# Patient Record
Sex: FEMALE | Race: WHITE | NOT HISPANIC OR LATINO | Employment: OTHER | URBAN - METROPOLITAN AREA
[De-identification: names, ages, dates, MRNs, and addresses within clinical notes are randomized per-mention and may not be internally consistent; named-entity substitution may affect disease eponyms.]

---

## 2017-03-29 ENCOUNTER — HOSPITAL ENCOUNTER (OUTPATIENT)
Facility: CLINIC | Age: 67
Setting detail: OBSERVATION
Discharge: HOME | End: 2017-03-30
Attending: FAMILY MEDICINE | Admitting: FAMILY MEDICINE
Payer: COMMERCIAL

## 2017-03-29 ENCOUNTER — APPOINTMENT (OUTPATIENT)
Dept: MRI IMAGING | Facility: CLINIC | Age: 67
End: 2017-03-29
Attending: FAMILY MEDICINE
Payer: COMMERCIAL

## 2017-03-29 ENCOUNTER — APPOINTMENT (OUTPATIENT)
Dept: CT IMAGING | Facility: CLINIC | Age: 67
End: 2017-03-29
Attending: FAMILY MEDICINE
Payer: COMMERCIAL

## 2017-03-29 DIAGNOSIS — G45.9 TRANSIENT CEREBRAL ISCHEMIA, UNSPECIFIED TYPE: ICD-10-CM

## 2017-03-29 LAB
ANION GAP SERPL CALCULATED.3IONS-SCNC: 8 MMOL/L (ref 3–14)
APTT PPP: 30 SEC (ref 22–37)
BASOPHILS # BLD AUTO: 0.1 10E9/L (ref 0–0.2)
BASOPHILS NFR BLD AUTO: 1.9 %
BUN SERPL-MCNC: 9 MG/DL (ref 7–30)
CALCIUM SERPL-MCNC: 8.5 MG/DL (ref 8.5–10.1)
CHLORIDE SERPL-SCNC: 106 MMOL/L (ref 94–109)
CO2 SERPL-SCNC: 29 MMOL/L (ref 20–32)
CREAT SERPL-MCNC: 0.84 MG/DL (ref 0.52–1.04)
DIFFERENTIAL METHOD BLD: NORMAL
EOSINOPHIL # BLD AUTO: 0.4 10E9/L (ref 0–0.7)
EOSINOPHIL NFR BLD AUTO: 5.8 %
ERYTHROCYTE [DISTWIDTH] IN BLOOD BY AUTOMATED COUNT: 13 % (ref 10–15)
GFR SERPL CREATININE-BSD FRML MDRD: 68 ML/MIN/1.7M2
GLUCOSE BLDC GLUCOMTR-MCNC: 102 MG/DL (ref 70–99)
GLUCOSE SERPL-MCNC: 97 MG/DL (ref 70–99)
HCT VFR BLD AUTO: 41.6 % (ref 35–47)
HGB BLD-MCNC: 14 G/DL (ref 11.7–15.7)
IMM GRANULOCYTES # BLD: 0 10E9/L (ref 0–0.4)
IMM GRANULOCYTES NFR BLD: 0.3 %
INR PPP: 1 (ref 0.86–1.14)
LYMPHOCYTES # BLD AUTO: 1.7 10E9/L (ref 0.8–5.3)
LYMPHOCYTES NFR BLD AUTO: 26.1 %
MCH RBC QN AUTO: 29.5 PG (ref 26.5–33)
MCHC RBC AUTO-ENTMCNC: 33.7 G/DL (ref 31.5–36.5)
MCV RBC AUTO: 88 FL (ref 78–100)
MONOCYTES # BLD AUTO: 0.5 10E9/L (ref 0–1.3)
MONOCYTES NFR BLD AUTO: 7.9 %
NEUTROPHILS # BLD AUTO: 3.7 10E9/L (ref 1.6–8.3)
NEUTROPHILS NFR BLD AUTO: 58 %
PLATELET # BLD AUTO: 227 10E9/L (ref 150–450)
POTASSIUM SERPL-SCNC: 3.6 MMOL/L (ref 3.4–5.3)
RBC # BLD AUTO: 4.75 10E12/L (ref 3.8–5.2)
SODIUM SERPL-SCNC: 143 MMOL/L (ref 133–144)
TROPONIN I SERPL-MCNC: NORMAL UG/L (ref 0–0.04)
WBC # BLD AUTO: 6.3 10E9/L (ref 4–11)

## 2017-03-29 PROCEDURE — 85730 THROMBOPLASTIN TIME PARTIAL: CPT | Performed by: FAMILY MEDICINE

## 2017-03-29 PROCEDURE — 70450 CT HEAD/BRAIN W/O DYE: CPT | Mod: XS

## 2017-03-29 PROCEDURE — 25000132 ZZH RX MED GY IP 250 OP 250 PS 637: Performed by: FAMILY MEDICINE

## 2017-03-29 PROCEDURE — A9585 GADOBUTROL INJECTION: HCPCS | Performed by: FAMILY MEDICINE

## 2017-03-29 PROCEDURE — 85610 PROTHROMBIN TIME: CPT | Performed by: FAMILY MEDICINE

## 2017-03-29 PROCEDURE — 70498 CT ANGIOGRAPHY NECK: CPT

## 2017-03-29 PROCEDURE — 25000125 ZZHC RX 250: Performed by: RADIOLOGY

## 2017-03-29 PROCEDURE — 99285 EMERGENCY DEPT VISIT HI MDM: CPT | Performed by: FAMILY MEDICINE

## 2017-03-29 PROCEDURE — 85025 COMPLETE CBC W/AUTO DIFF WBC: CPT | Performed by: FAMILY MEDICINE

## 2017-03-29 PROCEDURE — 25500064 ZZH RX 255 OP 636: Performed by: RADIOLOGY

## 2017-03-29 PROCEDURE — 96360 HYDRATION IV INFUSION INIT: CPT

## 2017-03-29 PROCEDURE — 96361 HYDRATE IV INFUSION ADD-ON: CPT

## 2017-03-29 PROCEDURE — 84484 ASSAY OF TROPONIN QUANT: CPT | Performed by: FAMILY MEDICINE

## 2017-03-29 PROCEDURE — 25000128 H RX IP 250 OP 636: Performed by: FAMILY MEDICINE

## 2017-03-29 PROCEDURE — 99285 EMERGENCY DEPT VISIT HI MDM: CPT | Mod: 25

## 2017-03-29 PROCEDURE — 00000146 ZZHCL STATISTIC GLUCOSE BY METER IP

## 2017-03-29 PROCEDURE — 99220 ZZC INITIAL OBSERVATION CARE,LEVL III: CPT | Performed by: FAMILY MEDICINE

## 2017-03-29 PROCEDURE — G0378 HOSPITAL OBSERVATION PER HR: HCPCS

## 2017-03-29 PROCEDURE — 80048 BASIC METABOLIC PNL TOTAL CA: CPT | Performed by: FAMILY MEDICINE

## 2017-03-29 PROCEDURE — 70553 MRI BRAIN STEM W/O & W/DYE: CPT

## 2017-03-29 PROCEDURE — 25500064 ZZH RX 255 OP 636: Performed by: FAMILY MEDICINE

## 2017-03-29 RX ORDER — IOPAMIDOL 755 MG/ML
70 INJECTION, SOLUTION INTRAVASCULAR ONCE
Status: COMPLETED | OUTPATIENT
Start: 2017-03-29 | End: 2017-03-29

## 2017-03-29 RX ORDER — GADOBUTROL 604.72 MG/ML
7 INJECTION INTRAVENOUS ONCE
Status: COMPLETED | OUTPATIENT
Start: 2017-03-29 | End: 2017-03-29

## 2017-03-29 RX ORDER — PROCHLORPERAZINE 25 MG
12.5 SUPPOSITORY, RECTAL RECTAL EVERY 12 HOURS PRN
Status: DISCONTINUED | OUTPATIENT
Start: 2017-03-29 | End: 2017-03-30 | Stop reason: HOSPADM

## 2017-03-29 RX ORDER — ASPIRIN 325 MG
325 TABLET ORAL DAILY
Status: DISCONTINUED | OUTPATIENT
Start: 2017-03-30 | End: 2017-03-30 | Stop reason: HOSPADM

## 2017-03-29 RX ORDER — ASPIRIN 325 MG
325 TABLET ORAL ONCE
Status: COMPLETED | OUTPATIENT
Start: 2017-03-29 | End: 2017-03-29

## 2017-03-29 RX ORDER — ATORVASTATIN CALCIUM 20 MG/1
40 TABLET, FILM COATED ORAL DAILY
Status: DISCONTINUED | OUTPATIENT
Start: 2017-03-29 | End: 2017-03-30 | Stop reason: HOSPADM

## 2017-03-29 RX ORDER — ONDANSETRON 2 MG/ML
4 INJECTION INTRAMUSCULAR; INTRAVENOUS EVERY 6 HOURS PRN
Status: DISCONTINUED | OUTPATIENT
Start: 2017-03-29 | End: 2017-03-30 | Stop reason: HOSPADM

## 2017-03-29 RX ORDER — LORAZEPAM 0.5 MG/1
0.5 TABLET ORAL ONCE
Status: DISCONTINUED | OUTPATIENT
Start: 2017-03-29 | End: 2017-03-30 | Stop reason: HOSPADM

## 2017-03-29 RX ORDER — CALCIUM CARBONATE 500(1250)
1000 TABLET ORAL DAILY
COMMUNITY

## 2017-03-29 RX ORDER — ONDANSETRON 4 MG/1
4 TABLET, ORALLY DISINTEGRATING ORAL EVERY 6 HOURS PRN
Status: DISCONTINUED | OUTPATIENT
Start: 2017-03-29 | End: 2017-03-30 | Stop reason: HOSPADM

## 2017-03-29 RX ORDER — PROCHLORPERAZINE MALEATE 5 MG
5 TABLET ORAL EVERY 6 HOURS PRN
Status: DISCONTINUED | OUTPATIENT
Start: 2017-03-29 | End: 2017-03-30 | Stop reason: HOSPADM

## 2017-03-29 RX ORDER — NALOXONE HYDROCHLORIDE 0.4 MG/ML
.1-.4 INJECTION, SOLUTION INTRAMUSCULAR; INTRAVENOUS; SUBCUTANEOUS
Status: DISCONTINUED | OUTPATIENT
Start: 2017-03-29 | End: 2017-03-30 | Stop reason: HOSPADM

## 2017-03-29 RX ORDER — ACETAMINOPHEN 325 MG/1
650 TABLET ORAL EVERY 4 HOURS PRN
Status: DISCONTINUED | OUTPATIENT
Start: 2017-03-29 | End: 2017-03-30 | Stop reason: HOSPADM

## 2017-03-29 RX ADMIN — ACETAMINOPHEN 650 MG: 325 TABLET, FILM COATED ORAL at 23:14

## 2017-03-29 RX ADMIN — IOPAMIDOL 70 ML: 755 INJECTION, SOLUTION INTRAVENOUS at 14:07

## 2017-03-29 RX ADMIN — SODIUM CHLORIDE 100 ML: 9 INJECTION, SOLUTION INTRAVENOUS at 14:08

## 2017-03-29 RX ADMIN — SODIUM CHLORIDE 500 ML: 9 INJECTION, SOLUTION INTRAVENOUS at 14:22

## 2017-03-29 RX ADMIN — ASPIRIN 325 MG ORAL TABLET 325 MG: 325 PILL ORAL at 15:30

## 2017-03-29 RX ADMIN — GADOBUTROL 7 ML: 604.72 INJECTION INTRAVENOUS at 17:37

## 2017-03-29 NOTE — ED NOTES
Patient has  Ashley to Observation  order. Patient has been given Patient Bill of Rights, Observation brochure and  What does Observation mean to me  forms.  Patient has been given the opportunity to ask questions about observation status and their plan of care.  Patient will be  oriented to the observation room, bathroom, and call light by floor nurse.    Radha Henriquez

## 2017-03-29 NOTE — IP AVS SNAPSHOT
[facility]    [address] [address]    Phone:  [phone]    Fax:  [phone]                                       After Visit Summary   3/29/2017    Milly Loaiza    MRN: [id number]           After Visit Summary Signature Page     I have received my discharge instructions, and my questions have been answered. I have discussed any challenges I see with this plan with the nurse or doctor.    ..........................................................................................................................................  Patient/Patient Representative Signature      ..........................................................................................................................................  Patient Representative Print Name and Relationship to Patient    ..................................................               ................................................  Date                                            Time    ..........................................................................................................................................  Reviewed by Signature/Title    ...................................................              ..............................................  Date                                                            Time

## 2017-03-29 NOTE — ED NOTES
"Pt comes in with  following \"confusion\" at noon. Pt presents to work with  and couldn't answer questions. At a loss of words per . Also had numbing of bilateral hands and fingers. Then about 20 mins PTA developed left sided HA 3/10. Now upon arrival all sx have resolved except HA. Talking in full sentances, denies numbness or tingling. Took full strength ASA this am per normal. Denies chest pain, shortness of breath. MD at bedside. PERRL. Patient placed on EKG monitor, pulse oximeter and blood pressure cuff.  "

## 2017-03-29 NOTE — ED PROVIDER NOTES
"  HPI  Patient is a 66-year-old female presenting by private car with her  for concerns of headache, changes in vision, and confusion.  She has a known history of breast cancer.  She takes aspirin 81 mg daily.  She does not smoke.  Occasional alcohol, none recently.  No drug use.    The patient was outside in her barn taking care of the animals when she had sudden onset of visual changes.  This occurred at approximately 12:15 PM, 1 hour and 45 minutes ago.  Her visual changes are described as \"spotty\" and \"not being able to see some of the visual field.\"  She denies having field cut or deficits.  There is no black changes to her vision.  There was just some blank spots described.  Her visual symptoms lasted approximately 30-45 minutes.  After that left, she had the onset of headache.  Her headache is behind her left forehead.  She has had headaches before but not like this.  This headache is somewhat more severe and is associated with a visual change which is totally new for her.  Also, she went to her place of work for her  was at about 12:45 PM.  She had great difficulty finding her words at that time and she could not answer questions as a result.  Her  tells me that her symptoms were very obvious but they have slowly improved since that time.  Currently, he tells me that she is answering questions almost back to normal.    ROS: All other review of systems are negative other than that noted above.   PMH: Reviewed.  SH: Reviewed.  FH: Reviewed.      PHYSICAL  BP (!) 178/101  Pulse 70  Temp 98  F (36.7  C) (Oral)  Resp 23  SpO2 95%  General: Patient is alert and in moderate distress.  She answers questions well but she does seem to positive some difficulty putting thought lines together.  Neurological: Alert.  No dysarthria or dysphasia.  CN II-VIII intact grossly.  Moving U/L extremities B.  Strength 5/5 U/L extremities B.  Sensation intact grossly to touch (equal).  Rapid alternating " movements intact.  Negative Rhomberg.  Normal finger-to-nose movments.  Head / Neck: Atraumatic.  Ears: Not done.  Eyes: Pupils are equal, round, and reactive.  Normal conjunctiva.  Nose: Midline.  No epistaxis.  Mouth / Throat: No ulcerations or lesions.  Upper pharynx is not erythematous.  Moist.  Respiratory: No respiratory distress. CTA B.  Cardiovascular: Regular rhythm.  Peripheral extremities are warm.  No edema.  No calf tenderness.  Abdomen / Pelvis: Not tender.  No distention.  Soft throughout.  Genitalia: Not done.  Musculoskeletal: No tenderness over major muscles and joints.  Skin: No evidence of rash or trauma.        PHYSICIAN  1355.  The patient was seen shortly after arrival.  I have concern for stroke symptoms.  Stroke evaluation is initiated.  CT imaging and labs are pending.    Labs Ordered and Resulted from Time of ED Arrival Up to the Time of Departure from the ED   GLUCOSE BY METER - Abnormal; Notable for the following:        Result Value    Glucose 102 (*)     All other components within normal limits   CBC WITH PLATELETS DIFFERENTIAL   BASIC METABOLIC PANEL   INR   PARTIAL THROMBOPLASTIN TIME   TROPONIN I   VITAL SIGNS AND NEURO CHECKS   ACTIVITY   PULSE OXIMETRY NURSING   GLUCOSE MONITOR NURSING POCT   NOTIFY   VISION CHECKS       IMAGING  CT head and CTA head and neck.  Images reviewed by me.  Radiology report was also reviewed.      1420.  CT without contrast is reviewed and is unremarkable.  I spoke with the radiologist as well about the CTA portion and this is also unremarkable.    1428.  I spoke with Dr. Gaffney at the [facility].  His recommendation was to have the patient admitted for observation and further workup as necessary.  No medication recommendations at this time.  Aspirin full-strength will be given.    1448.  Patient will be admitted here.  Awaiting call back from the medicine service.      1525.  Pt refused Lipitor.        IMPRESSION    ICD-10-CM    1. Transient cerebral  ischemia, unspecified type G45.9            Critical Care Time:  none   Trauma:      CMS Coding:  None         Freeman Denson MD  03/29/17 1450       Freeman Denson MD  03/29/17 1536

## 2017-03-29 NOTE — H&P
"SUBJECTIVE:  Visual field cut.      HISTORY OF PRESENT ILLNESS:  Milly Loaiza was getting ready for work when she noticed fairly sudden onset of some visual field cut.  She felt she just could not see to the right, this was with both eyes.  She could look at the clock and see the 12 but could not see the numbers on the right side of the clock.  She could see peripheral to that so that she was able to actually finish getting ready for work and drive into work.  This sudden field cut lasted about 20 minutes and she noted shortly thereafter she developed a headache above the left eye.  This headache was exactly similar to frequent headaches she gets that she has called \"sinus headaches.\"  These headaches can come on every couple of months or more often at times, can last a few hours or a couple of days.  They have not been associated with visual field cuts in the past or any prodromal symptoms, but are always on the left side above the eye.      Sometime shortly after the symptoms began she met her  and he noticed that she was having a hard time finding her words.  She noticed this as well.  That all resolved after about an hour and a half.  She does admit that she had similar symptoms of not finding her words back in the 1990s when she used to have some panic attacks that were brought on by stressful situations.  She admits the visual field visual difficulties did increase her anxiety somewhat today.      PAST MEDICAL HISTORY:  Breast cancer on 2 different occasions, she is 9 years out cancer free.  No chemotherapy in a long time.      MEDICATIONS PRIOR TO ADMISSION:   1.  Aspirin 81 mg daily that she has been taking for the last 10 years when the doctor told her it would not be a bad idea to do.   3.  Calcium carbonate Os-Gab 1000 mg daily.   4.  Multivite daily.      ALLERGIES:  Sulfa, unknown reaction.      FAMILY HISTORY:  Significant for alcohol issues in mother as well as emphysema.  Father had a stroke " at 87.  Maternal grandmother and grandfather had strokes in their 80s and 90s.      SOCIAL HISTORY:  She lives with her  .  She works 4 hours 3 days a week.  She is a never smoker, rare alcohol.  She generally does not like to take pills.      REVIEW OF SYSTEMS:  Other than the above is negative.  She denies any weakness, numbness, paresthesias, no problems with swallowing.  Rest of 10-point review of systems was negative.      PHYSICAL EXAMINATION:   GENERAL:  She is awake, alert, oriented x3, in no apparent distress.  Speech is fluent at this time.   VITAL SIGNS:  Temperature is 98, pulse 70, respirations 18, blood pressure 135-176/.  O2 sat 98% on room air.   HEENT:  Eyes show pupils equal and reactive, EOMs intact.  TMs normal.  Nasal mucosa is normal.  Throat is normal.   NECK:  Supple, without masses, nodes or thyromegaly.   CHEST:  Clear to A&P.   CARDIOVASCULAR:  Regular rate and rhythm without murmur, click or rub.  Pulses are brisk and equal.  No JVD, HJR.  No edema.   ABDOMEN:  Soft, nontender, without HSM or masses.   GENITOURINARY AND RECTAL:  Deferred.   EXTREMITIES:  Grossly normal.   NEUROLOGIC:  Shows good visual fields at this time.  No diplopia.  Pupils are equal and reactive.  No facial nerve palsy, good sensation in the face.  Speech is clear, fluent.  Upper extremities show good finger-nose testing, rapid alternating movements, strength and sensation in the lower extremities shows symmetric strength, sensation and DTRs, no clonus.  Toes are downgoing.   CTA shows some beating of the carotids consistent with some fibromuscular dysplasia but no plaque.  CT head is negative.      LABORATORY:  All within normal limits including CBC, CMP, troponin, coags.      ASSESSMENT:     1.  Transient visual field loss.  This may be a TIA with the secondary expressive aphasia symptoms that she had.  However, this may all be migraine headaches that she has, since she did get a headache just at the  time this scotoma resolved and it was exactly the same headache that she has gotten through the years on the left periorbital region.  At this point, I think we have to treat as a TIA until further workup is done and we can further assess risks.  She is certainly at low risk for vascular disease.  We will check an MRI.  Telemetry in the next 12-18 hours.  Echocardiogram tomorrow.  I did encourage her to take Lipitor.  She was somewhat reluctant initially because of side effects she has heard about.  Whole aspirin daily.  Would try to put this all together to determine what the likelihood of actual TIA versus complex migraine.   2.  History of breast cancer.  No evidence of recurrence problems with that at this time.      CODE STATUS:  Full.      PROPHYLAXIS:  Low risk.      DISPOSITION:  She is observation and I expect her to be discharged tomorrow.         NATY KERR MD             D: 2017 17:09   T: 2017 17:32   MT: [id number]      Name:     ASHWINI MEEKS   MRN:      [id number]        Account:      [id number]   :      1950           Admitted:     [id number]      Document: [id number]

## 2017-03-29 NOTE — IP AVS SNAPSHOT
MRN:[id number]                      After Visit Summary   3/29/2017    Milly Loaiza    MRN: [id number]           Thank you!     Thank you for choosing [facility] for your care. Our goal is always to provide you with excellent care. Hearing back from our patients is one way we can continue to improve our services. Please take a few minutes to complete the written survey that you may receive in the mail after you visit with us. Thank you!        Patient Information     Date Of Birth          1950        About your hospital stay     You were admitted on:  March 29, 2017 You last received care in the:  [facility]    You were discharged on:  March 30, 2017       Who to Call     For medical emergencies, please call 911.  For non-urgent questions about your medical care, please call your primary care provider or clinic, [phone]          Attending Provider     Provider Specialty    Freeman Denson MD Emergency Medicine    [facility]    Liv Weber MD Internal Medicine       Primary Care Provider Office Phone # Fax #    Nhung Navarro [phone]       [address] [address]        After Care Instructions     Activity       Your activity upon discharge: activity as tolerated            Diet       Follow this diet upon discharge: Orders Placed This Encounter      Regular Diet Adult                  Follow-up Appointments     Follow-up and recommended labs and tests        Follow up with primary care provider, Nhung Navarro, within 1-2weeks, for hospital follow- up.                  Your next 10 appointments already scheduled     Apr 05, 2017 10:00 AM CDT   SHORT with Nhung Navarro MD   [facility]    [address] [place] [address]   [phone]            Sep 20, 2017 11:45 AM CDT   MA SCREENING DIGITAL BILATERAL with  [id number]   [facility] ([facility])    [address] [address]   [phone]           Do not use any powder, lotion or deodorant under your arms or on your breast. If you do, we will ask you to remove it before your exam.  Wear comfortable, two-piece clothing.  If you have any allergies, tell your care team.  Bring any previous mammograms from other facilities or have them mailed to the breast center.            Oct 04, 2017 11:30 AM CDT   LAB with [facility] ([facility])    [address] [address]   [phone]           Patient must bring picture ID.  Patient should be prepared to give a urine specimen  Please do not eat 10-12 hours before your appointment if you are coming in fasting for labs on lipids, cholesterol, or glucose (sugar).  Pregnant women should follow their Care Team instructions. Water with medications is okay. Do not drink coffee or other fluids.   If you have concerns about taking  your medications, please ask at office or if scheduling via [employer], send a message by clicking on Secure Messaging, Message Your Care Team.            Oct 16, 2017 11:30 AM CDT   Return Visit with Jacinta Lerma MD   [facility] ([facility])    [facility]  [address]  [facility] [address]   [phone]              Pending Results     No orders found for last 3 day(s).            Statement of Approval     Ordered          03/30/17 1049  I have reviewed and agree with all the recommendations and orders detailed in this document.  EFFECTIVE NOW     Approved and electronically signed by:  Liv Weber MD             Admission Information     Date & Time Provider Department Dept. Phone    3/29/2017 Liv Weber MD [facility] [phone]      Your Vitals Were     Blood Pressure Pulse Temperature Respirations Pulse Oximetry        "135/81 (BP Location: Right arm) 74 98  F (36.7  C) (Oral) 18 98%       MyChart Information     [employer] lets you send messages to your doctor, view your test results, renew your prescriptions, schedule appointments and more. To sign up, go to [email or web address] . Click on \"Log in\" on the left side of the screen, which will take you to the Welcome page. Then click on \"Sign up Now\" on the right side of the page.     You will be asked to enter the access code listed below, as well as some personal information. Please follow the directions to create your username and password.     Your access code is: [email or web address]  Expires: 2017  7:29 PM     Your access code will  in 90 days. If you need help or a new code, please call your [facility] clinic or [phone].        Care EveryWhere ID     This is your Care EveryWhere ID. This could be used by other organizations to access your [facility] medical records  [id number]           Review of your medicines      START taking        Dose / Directions    atorvastatin 40 MG tablet   Commonly known as:  LIPITOR   Used for:  Transient cerebral ischemia, unspecified type        Dose:  40 mg   Take 1 tablet (40 mg) by mouth daily   Quantity:  30 tablet   Refills:  3         CONTINUE these medicines which may have CHANGED, or have new prescriptions. If we are uncertain of the size of tablets/capsules you have at home, strength may be listed as something that might have changed.        Dose / Directions    aspirin 325 MG tablet   This may have changed:    - medication strength  - how much to take   Used for:  Transient cerebral ischemia, unspecified type        Dose:  325 mg   Take 1 tablet (325 mg) by mouth daily   Quantity:  120 tablet   Refills:  3         CONTINUE these medicines which have NOT CHANGED        Dose / Directions    calcium carbonate 500 MG tablet   Commonly known as:  OS-LUCIAN 500 mg [place]. Ca        Dose:  1000 mg   Take 1,000 mg by mouth daily   Refills:  0 "       DAILY MULTI VITAMIN/MINERALS PO        1 TABLET DAILY   Refills:  0            Where to get your medicines      These medications were sent to [facility] [place], [place] - [address]  [address], [facility] [address]     Phone:  [phone]     aspirin 325 MG tablet    atorvastatin 40 MG tablet                Protect others around you: Learn how to safely use, store and throw away your medicines at www.disposemymeds.org.             Medication List: This is a list of all your medications and when to take them. Check marks below indicate your daily home schedule. Keep this list as a reference.      Medications           Morning Afternoon Evening Bedtime As Needed    aspirin 325 MG tablet   Take 1 tablet (325 mg) by mouth daily   Last time this was given:  325 mg on 3/30/2017  8:35 AM                                   atorvastatin 40 MG tablet   Commonly known as:  LIPITOR   Take 1 tablet (40 mg) by mouth daily   Last time this was given:  40 mg on 3/30/2017  8:35 AM                                   calcium carbonate 500 MG tablet   Commonly known as:  OS-LUCIAN 500 mg [place]. Ca   Take 1,000 mg by mouth daily                                   DAILY MULTI VITAMIN/MINERALS PO   1 TABLET DAILY

## 2017-03-30 ENCOUNTER — APPOINTMENT (OUTPATIENT)
Dept: CARDIOLOGY | Facility: CLINIC | Age: 67
End: 2017-03-30
Attending: FAMILY MEDICINE
Payer: COMMERCIAL

## 2017-03-30 VITALS
DIASTOLIC BLOOD PRESSURE: 81 MMHG | HEART RATE: 74 BPM | OXYGEN SATURATION: 98 % | RESPIRATION RATE: 18 BRPM | SYSTOLIC BLOOD PRESSURE: 135 MMHG | TEMPERATURE: 98 F

## 2017-03-30 PROCEDURE — G0378 HOSPITAL OBSERVATION PER HR: HCPCS

## 2017-03-30 PROCEDURE — 93306 TTE W/DOPPLER COMPLETE: CPT

## 2017-03-30 PROCEDURE — 25000132 ZZH RX MED GY IP 250 OP 250 PS 637: Performed by: FAMILY MEDICINE

## 2017-03-30 PROCEDURE — 99207 ZZC CDG-CODE CATEGORY CHANGED: CPT | Performed by: INTERNAL MEDICINE

## 2017-03-30 PROCEDURE — 99217 ZZC OBSERVATION CARE DISCHARGE: CPT | Performed by: INTERNAL MEDICINE

## 2017-03-30 PROCEDURE — 93306 TTE W/DOPPLER COMPLETE: CPT | Mod: 26 | Performed by: INTERNAL MEDICINE

## 2017-03-30 RX ORDER — ASPIRIN 325 MG
325 TABLET ORAL DAILY
Qty: 120 TABLET | Refills: 3 | Status: SHIPPED | OUTPATIENT
Start: 2017-03-30

## 2017-03-30 RX ORDER — ATORVASTATIN CALCIUM 40 MG/1
40 TABLET, FILM COATED ORAL DAILY
Qty: 30 TABLET | Refills: 3 | Status: SHIPPED | OUTPATIENT
Start: 2017-03-30 | End: 2017-10-16

## 2017-03-30 RX ADMIN — ATORVASTATIN CALCIUM 40 MG: 20 TABLET, FILM COATED ORAL at 08:35

## 2017-03-30 RX ADMIN — ACETAMINOPHEN 650 MG: 325 TABLET, FILM COATED ORAL at 08:35

## 2017-03-30 RX ADMIN — ASPIRIN 325 MG ORAL TABLET 325 MG: 325 PILL ORAL at 08:35

## 2017-03-30 NOTE — PHARMACY - DISCHARGE MEDICATION RECONCILIATION
Discharge medication review for this patient is complete. Pharmacist assisted with medication reconciliation of discharge medications with prior to admission medications.     The following changes were made to the discharge medication list based on pharmacist review:  Added:  none  Discontinued: none  Changed: none      Patient's Discharge Medication List  - medications as listed on After Visit Summary (AVS)     Review of your medicines      START taking       Dose / Directions    atorvastatin 40 MG tablet   Commonly known as:  LIPITOR   Used for:  Transient cerebral ischemia, unspecified type        Dose:  40 mg   Take 1 tablet (40 mg) by mouth daily   Quantity:  30 tablet   Refills:  3         CONTINUE these medicines which may have CHANGED, or have new prescriptions. If we are uncertain of the size of tablets/capsules you have at home, strength may be listed as something that might have changed.       Dose / Directions    aspirin 325 MG tablet   This may have changed:    - medication strength  - how much to take   Used for:  Transient cerebral ischemia, unspecified type        Dose:  325 mg   Take 1 tablet (325 mg) by mouth daily   Quantity:  120 tablet   Refills:  3         CONTINUE these medicines which have NOT CHANGED       Dose / Directions    calcium carbonate 500 MG tablet   Commonly known as:  OS-LUCIAN 500 mg [place]. Ca        Dose:  1000 mg   Take 1,000 mg by mouth daily   Refills:  0       DAILY MULTI VITAMIN/MINERALS PO        1 TABLET DAILY   Refills:  0            Where to get your medicines      These medications were sent to [facility] [address]  [address] [address]     Phone:  [phone]      aspirin 325 MG tablet     atorvastatin 40 MG tablet           Gerard HuertaD

## 2017-03-30 NOTE — DISCHARGE SUMMARY
"[facility] Discharge Summary    Milly Loaiza MRN# [id number]   Age: 66 year old YOB: 1950     Date of Admission:  3/29/2017  Date of Discharge::  3/30/2017  Admitting Physician:  Jose Guo MD  Discharge Physician:  Liv Weber MD  Primary Physician: Nhung Navarro  Transferring Facility: N/A     Home clinic: [facility]          Admission Diagnoses:   Transient cerebral ischemia, unspecified type [G45.9]          Discharge Diagnosis:   Principle diagnosis: TIA  Secondary diagnoses:  TIA           Brief History of Presenting Illness:   AS PER ADMIT HX  Milly Loaiza was getting ready for work when she noticed fairly sudden onset of some visual field cut. She felt she just could not see to the right, this was with both eyes. She could look at the clock and see the 12 but could not see the numbers on the right side of the clock. She could see peripheral to that so that she was able to actually finish getting ready for work and drive into work. This sudden field cut lasted about 20 minutes and she noted shortly thereafter she developed a headache above the left eye. This headache was exactly similar to frequent headaches she gets that she has called \"sinus headaches.\" These headaches can come on every couple of months or more often at times, can last a few hours or a couple of days. They have not been associated with visual field cuts in the past or any prodromal symptoms, but are always on the left side above the eye.       Sometime shortly after the symptoms began she met her  and he noticed that she was having a hard time finding her words. She noticed this as well. That all resolved after about an hour and a half. She does admit that she had similar symptoms of not finding her words back in the 1990s when she used to have some panic attacks that were brought on by stressful situations. She admits the visual field visual difficulties did increase her " anxiety somewhat today.         Recent Results (from the past 24 hour(s))   CT Head w/o Contrast    Narrative    CT SCAN OF THE HEAD WITHOUT CONTRAST March 29, 2017 2:15 PM     HISTORY: Vision changes, dysphasia.    TECHNIQUE: Axial images of the head and coronal reformations without  IV contrast material. Radiation dose for this scan was reduced using  automated exposure control, adjustment of the mA and/or kV according  to patient size, or iterative reconstruction technique.    COMPARISON: None.    FINDINGS: The ventricles are normal in size, shape and configuration.  The brain parenchyma and subarachnoid spaces are normal. There is no  evidence of intracranial hemorrhage, mass, acute infarct or anomaly.     The visualized portions of the sinuses and mastoids appear normal.  There is no evidence of trauma.       Impression    IMPRESSION: Normal CT scan of the head.      NACHO GUADALUPE MD   CT Head Neck Angio w/o & w Contrast    Narrative    CTA ANGIOGRAM HEAD/NECK March 29, 2017 2:29 PM     HISTORY: Blurry vision in right eye, upper extremity numbness.    TECHNIQUE: Axial images were obtained through the head and neck  without and with intravenous contrast. 70 mL Isovue-370 was given.  Multiplanar reconstructions were performed. 3-D reconstructions off a  remote workstation for CT angiography were also acquired. Radiation  dose for this scan was reduced using automated exposure control,  adjustment of the mA and/or kV according to patient size, or iterative  reconstruction technique.    COMPARISON: None.    FINDINGS:  Brachiocephalic vessels: There is some mild calcific plaque near the  origin of the left subclavian artery but no stenosis. The left  vertebral artery has origin at the junction of the aortic arch and  left subclavian artery.    Right carotid system: Minimal beading is seen in the cervical region  consistent with some fibromuscular dysplasia. Carotid bifurcations  widely patent. There is no stenosis or  dissection.    Left carotid system: Minimal beading is seen in the cervical internal  carotid artery suggesting fibromuscular dysplasia. Carotid bifurcation  is patent. There is no stenosis or dissection.    Right vertebral artery: Normal nondominant vessel.    Left vertebral artery: Normal.    [place]: Normal.    Other findings: Postcontrast images through the brain do not show any  abnormal areas of enhancement. Images through the neck are  unremarkable as visualized.      Impression    IMPRESSION:  1. Minimal beading of the cervical internal carotid arteries  consistent with some fibromuscular dysplasia. There is no evidence for  stenosis or dissection.  2. No evidence for stenosis, dissection, thromboembolism, or aneurysm.    NACHO GUADALUPE MD   MR Brain w/o & w Contrast    Narrative    MRI OF THE BRAIN WITHOUT AND WITH CONTRAST 3/29/2017 5:37 PM     COMPARISON: Head CT same day.    HISTORY: Transient ischemic attack.    TECHNIQUE: Axial diffusion-weighted with ADC map, axial T2-weighted  with fat saturation, axial T1-weighted, axial turboFLAIR and coronal  T1-weighted images of the brain were acquired without intravenous  contrast.  Following intravenous administration of gadolinium (7 mL  Gadavist), axial T1-weighted images of the brain were acquired.     FINDINGS: There is mild diffuse cerebral volume loss. There are a few  tiny scattered focal areas of abnormal T2 signal hyperintensity in the  cerebral white matter bilaterally that are consistent with sequela of  chronic small vessel ischemic disease.    The ventricles and basal cisterns are within normal limits in  configuration given the degree of cerebral volume loss. There is no  midline shift. There are no extra-axial fluid collections. There is no  evidence for stroke or acute intracranial hemorrhage.  There is no  abnormal contrast enhancement in the brain or its coverings.    There is no sinusitis or mastoiditis.      Impression    IMPRESSION:  Diffuse cerebral volume loss and cerebral white matter  changes consistent with chronic small vessel ischemic disease. No  evidence for acute intracranial pathology.     EDNA GARCIA MD     Echo-A cardiac source of embolus was not identified.  The rhythm was normal sinus.  The left ventricle is normal in structure, function and size.  The visual ejection fraction is estimated at 60-65%.  The right ventricle is normal in structure, function and size.  There is trace aortic regurgitation.          Hospital Course:   TIA  Presented with sx as described above. All imaging w/u negative. ECHO negative--as above.   Will d/c on statin, full aspirin--was on baby aspirin before. F/u PMD in couple weeks          Procedures:   No procedures performed during this admission         Allergies:      Allergies   Allergen Reactions     Sulfa Drugs Unknown             Medications Prior to Admission:     Prescriptions Prior to Admission   Medication Sig Dispense Refill Last Dose     calcium carbonate (OS-LUCIAN 500 MG [place]. [place]) 500 MG tablet Take 1,000 mg by mouth daily   3/29/2017 at am     DAILY MULTI VITAMIN/MINERALS OR 1 TABLET DAILY   3/29/2017 at am     [DISCONTINUED] aspirin 81 MG tablet Take by mouth daily   3/29/2017 at am             Discharge Medications:     Current Discharge Medication List      START taking these medications    Details   atorvastatin (LIPITOR) 40 MG tablet Take 1 tablet (40 mg) by mouth daily  Qty: 30 tablet, Refills: 3    Associated Diagnoses: Transient cerebral ischemia, unspecified type         CONTINUE these medications which have CHANGED    Details   aspirin 325 MG tablet Take 1 tablet (325 mg) by mouth daily  Qty: 120 tablet, Refills: 3    Associated Diagnoses: Transient cerebral ischemia, unspecified type         CONTINUE these medications which have NOT CHANGED    Details   calcium carbonate (OS-LUCIAN 500 MG [place]. [place]) 500 MG tablet Take 1,000 mg by mouth daily      DAILY MULTI VITAMIN/MINERALS OR 1  TABLET DAILY                   Consultations:   No consultations were requested during this admission            Discharge Exam:   Blood pressure 135/81, pulse 83, temperature 98  F (36.7  C), temperature source Oral, resp. rate 18, SpO2 98 %, not currently breastfeeding.  GENERAL APPEARANCE: healthy, alert and no distress  EYES: conjunctiva clear, eyes grossly normal  HENT: external ears and nose normal   NECK: supple, no masses or adenopathy  RESP: lungs clear to auscultation - no rales, rhonchi or wheezes  CV: regular rate and rhythm, normal S1 S2, no S3 or S4 and no murmur, click or rub   ABDOMEN: soft, nontender, no HSM or masses and bowel sounds normal  MS: no clubbing, cyanosis; no edema  SKIN: clear without significant rashes or lesions  NEURO: Normal strength and tone, sensory exam grossly normal, mentation intact and speech normal    Unresulted Labs Ordered in the Past 30 Days of this Admission     No orders found for last 61 day(s).          Recent Results (from the past 24 hour(s))   CT Head w/o Contrast    Narrative    CT SCAN OF THE HEAD WITHOUT CONTRAST March 29, 2017 2:15 PM     HISTORY: Vision changes, dysphasia.    TECHNIQUE: Axial images of the head and coronal reformations without  IV contrast material. Radiation dose for this scan was reduced using  automated exposure control, adjustment of the mA and/or kV according  to patient size, or iterative reconstruction technique.    COMPARISON: None.    FINDINGS: The ventricles are normal in size, shape and configuration.  The brain parenchyma and subarachnoid spaces are normal. There is no  evidence of intracranial hemorrhage, mass, acute infarct or anomaly.     The visualized portions of the sinuses and mastoids appear normal.  There is no evidence of trauma.       Impression    IMPRESSION: Normal CT scan of the head.      NACHO GUADALUPE MD   CT Head Neck Angio w/o & w Contrast    Narrative    CTA ANGIOGRAM HEAD/NECK March 29, 2017 2:29 PM     HISTORY:  Blurry vision in right eye, upper extremity numbness.    TECHNIQUE: Axial images were obtained through the head and neck  without and with intravenous contrast. 70 mL Isovue-370 was given.  Multiplanar reconstructions were performed. 3-D reconstructions off a  remote workstation for CT angiography were also acquired. Radiation  dose for this scan was reduced using automated exposure control,  adjustment of the mA and/or kV according to patient size, or iterative  reconstruction technique.    COMPARISON: None.    FINDINGS:  Brachiocephalic vessels: There is some mild calcific plaque near the  origin of the left subclavian artery but no stenosis. The left  vertebral artery has origin at the junction of the aortic arch and  left subclavian artery.    Right carotid system: Minimal beading is seen in the cervical region  consistent with some fibromuscular dysplasia. Carotid bifurcations  widely patent. There is no stenosis or dissection.    Left carotid system: Minimal beading is seen in the cervical internal  carotid artery suggesting fibromuscular dysplasia. Carotid bifurcation  is patent. There is no stenosis or dissection.    Right vertebral artery: Normal nondominant vessel.    Left vertebral artery: Normal.    [place]: Normal.    Other findings: Postcontrast images through the brain do not show any  abnormal areas of enhancement. Images through the neck are  unremarkable as visualized.      Impression    IMPRESSION:  1. Minimal beading of the cervical internal carotid arteries  consistent with some fibromuscular dysplasia. There is no evidence for  stenosis or dissection.  2. No evidence for stenosis, dissection, thromboembolism, or aneurysm.    NACHO GUADALUPE MD   MR Brain w/o & w Contrast    Narrative    MRI OF THE BRAIN WITHOUT AND WITH CONTRAST 3/29/2017 5:37 PM     COMPARISON: Head CT same day.    HISTORY: Transient ischemic attack.    TECHNIQUE: Axial diffusion-weighted with ADC map, axial  T2-weighted  with fat saturation, axial T1-weighted, axial turboFLAIR and coronal  T1-weighted images of the brain were acquired without intravenous  contrast.  Following intravenous administration of gadolinium (7 mL  Gadavist), axial T1-weighted images of the brain were acquired.     FINDINGS: There is mild diffuse cerebral volume loss. There are a few  tiny scattered focal areas of abnormal T2 signal hyperintensity in the  cerebral white matter bilaterally that are consistent with sequela of  chronic small vessel ischemic disease.    The ventricles and basal cisterns are within normal limits in  configuration given the degree of cerebral volume loss. There is no  midline shift. There are no extra-axial fluid collections. There is no  evidence for stroke or acute intracranial hemorrhage.  There is no  abnormal contrast enhancement in the brain or its coverings.    There is no sinusitis or mastoiditis.      Impression    IMPRESSION: Diffuse cerebral volume loss and cerebral white matter  changes consistent with chronic small vessel ischemic disease. No  evidence for acute intracranial pathology.     EDNA GARCIA MD            Pending Tests at Discharge:   None         Discharge Instructions and Follow-Up:   Discharge diet: Regular   Discharge activity: Activity as tolerated   Discharge follow-up: Follow up with primary care provider in 2 weeks           Discharge Disposition:   Discharged to home      Attestation:  I have reviewed today's vital signs, notes, medications, labs and imaging.    Time Spent on this Encounter   I, Liv Weber, personally saw the patient today and spent less than or equal to 30 minutes discharging this patient.    Liv Weber MD

## 2017-03-30 NOTE — PROGRESS NOTES
[facility] ADMISSION NOTE    Patient admitted to room 2307 at approximately 1945 via wheel chair from emergency room. Patient was accompanied by transport tech.     Verbal SBAR report received from Monica prior to patient arrival.     Patient ambulated to bed independently. Patient alert and oriented X 3. The patient is not having any pain.  . Admission vital signs: Blood pressure 148/86, pulse 83, temperature 99  F (37.2  C), temperature source Oral, resp. rate 20, SpO2 98 %, not currently breastfeeding. Patient was oriented to plan of care, call light, bed controls, tv, telephone, bathroom and visiting hours.     The following safety risks were identified during admission: none. Yellow risk band applied: IVAN Salazar

## 2017-03-30 NOTE — PROGRESS NOTES
[facility] DISCHARGE NOTE    Patient discharged to home at 12:06 PM via wheel chair. Accompanied by spouse and staff. Discharge instructions reviewed with patient, opportunity offered to ask questions. Prescriptions sent to patients preferred pharmacy. All belongings sent with patient.    Claire Lopez

## 2017-03-30 NOTE — PLAN OF CARE
Problem: Goal Outcome Summary  Goal: Goal Outcome Summary  Outcome: Improving  VSS. Neuro status intact. Pt states comfort, wishes to sleep. Will continue to monitor.

## 2017-03-31 ENCOUNTER — TELEPHONE (OUTPATIENT)
Dept: FAMILY MEDICINE | Facility: CLINIC | Age: 67
End: 2017-03-31

## 2017-03-31 NOTE — TELEPHONE ENCOUNTER
ED/UC/IP follow up phone call: translent cerebral ischemia    RN please call to follow up.    Number of ED visits in past 12 months =0/0    [facility] [place] [facility]

## 2017-04-05 ENCOUNTER — OFFICE VISIT (OUTPATIENT)
Dept: FAMILY MEDICINE | Facility: CLINIC | Age: 67
End: 2017-04-05
Payer: COMMERCIAL

## 2017-04-05 VITALS
HEART RATE: 76 BPM | DIASTOLIC BLOOD PRESSURE: 70 MMHG | BODY MASS INDEX: 27.51 KG/M2 | SYSTOLIC BLOOD PRESSURE: 126 MMHG | HEIGHT: 64 IN | TEMPERATURE: 97.8 F | WEIGHT: 161.13 LBS

## 2017-04-05 DIAGNOSIS — G45.9 TRANSIENT CEREBRAL ISCHEMIA, UNSPECIFIED TYPE: Primary | ICD-10-CM

## 2017-04-05 DIAGNOSIS — Z71.89 ADVANCED DIRECTIVES, COUNSELING/DISCUSSION: ICD-10-CM

## 2017-04-05 PROCEDURE — 99214 OFFICE O/P EST MOD 30 MIN: CPT | Performed by: FAMILY MEDICINE

## 2017-04-05 NOTE — MR AVS SNAPSHOT
After Visit Summary   4/5/2017    Milly Loaiza    MRN: [id number]           Patient Information     Date Of Birth          1950        Visit Information        Provider Department      4/5/2017 10:00 AM Nhung Navarro MD [facility]        Today's Diagnoses     Transient cerebral ischemia, unspecified type    -  1    Advanced directives, counseling/discussion           Follow-ups after your visit        Your next 10 appointments already scheduled     Sep 20, 2017 11:45 AM CDT   MA SCREENING DIGITAL BILATERAL with [address] [facility] ([facility])    [address] [address] [phone]           Do not use any powder, lotion or deodorant under your arms or on your breast. If you do, we will ask you to remove it before your exam.  Wear comfortable, two-piece clothing.  If you have any allergies, tell your care team.  Bring any previous mammograms from other facilities or have them mailed to the breast center.            Oct 04, 2017 11:30 AM CDT   LAB with [facility] ([facility])    [address] [address] [phone]           Patient must bring picture ID.  Patient should be prepared to give a urine specimen  Please do not eat 10-12 hours before your appointment if you are coming in fasting for labs on lipids, cholesterol, or glucose (sugar).  Pregnant women should follow their Care Team instructions. Water with medications is okay. Do not drink coffee or other fluids.   If you have concerns about taking  your medications, please ask at office or if scheduling via [employer], send a message by clicking on Secure Messaging, Message Your Care Team.            Oct 16, 2017 11:30 AM CDT   Return Visit with Jacinta Lerma MD   [facility] ([facility])    [facility]  [address]  [facility] [address]  "  [phone]              Who to contact     Normal or non-critical lab and imaging results will be communicated to you by [employer], letter or phone within 4 business days after the clinic has received the results. If you do not hear from us within 7 days, please contact the clinic through MyChart or phone. If you have a critical or abnormal lab result, we will notify you by phone as soon as possible.  Submit refill requests through [employer] or call your pharmacy and they will forward the refill request to us. Please allow 3 business days for your refill to be completed.          If you need to speak with a  for additional information , please call: [phone]           Additional Information About Your Visit        Sharethrough Information     [employer] lets you send messages to your doctor, view your test results, renew your prescriptions, schedule appointments and more. To sign up, go to [email or web address] . Click on \"Log in\" on the left side of the screen, which will take you to the Welcome page. Then click on \"Sign up Now\" on the right side of the page.     You will be asked to enter the access code listed below, as well as some personal information. Please follow the directions to create your username and password.     Your access code is: [email or web address]  Expires: 2017  7:29 PM     Your access code will  in 90 days. If you need help or a new code, please call your [facility] clinic or [phone].        Care EveryWhere ID     This is your Care EveryWhere ID. This could be used by other organizations to access your [facility] medical records  [id number]        Your Vitals Were     Pulse Temperature Height BMI (Body Mass Index)          76 97.8  F (36.6  C) (Tympanic) 5' 4\" (1.626 m) 27.66 kg/m2         Blood Pressure from Last 3 Encounters:   17 126/70   17 135/81   16 130/80    Weight from Last 3 Encounters:   17 161 lb 2 oz (73.1 kg)   16 163 lb 2 oz (74 " kg)   10/17/16 160 lb 3.2 oz (72.7 kg)              Today, you had the following     No orders found for display       Primary Care Provider Office Phone # Fax #    Nhung Navarro [phone]       [facility] [address] [place] [address]        Thank you!     Thank you for choosing [facility]  for your care. Our goal is always to provide you with excellent care. Hearing back from our patients is one way we can continue to improve our services. Please take a few minutes to complete the written survey that you may receive in the mail after your visit with us. Thank you!             Your Updated Medication List - Protect others around you: Learn how to safely use, store and throw away your medicines at www.disposemymeds.org.          This list is accurate as of: 4/5/17 11:59 PM.  Always use your most recent med list.                   Brand Name Dispense Instructions for use    aspirin 325 MG tablet     120 tablet    Take 1 tablet (325 mg) by mouth daily       atorvastatin 40 MG tablet    LIPITOR    30 tablet    Take 1 tablet (40 mg) by mouth daily       calcium carbonate 500 MG tablet    OS-LUCIAN 500 mg [place]. [place]     Take 1,000 mg by mouth daily       DAILY MULTI VITAMIN/MINERALS PO      1 TABLET DAILY

## 2017-04-05 NOTE — PROGRESS NOTES
SUBJECTIVE:                                                    Milly Loaiza is a 66 year old female who presents to clinic today for the following health issues:        Hospital Follow-up Visit:    Hospital/Nursing Home/IP Rehab Facility: [facility]  Date of Admission: 3/29/2017  Date of Discharge: 3/30/2017  Reason(s) for Admission: TIA            Problems taking medications regularly:  None       Medication changes since discharge: None       Problems adhering to non-medication therapy:  None    Summary of hospitalization:  [facility] discharge summary reviewed  Diagnostic Tests/Treatments reviewed.  Follow up needed: none  Other Healthcare Providers Involved in Patient s Care:         None  Update since discharge: improved. She has had no other episodes of blurry vision. Saturday she did have a headache which resolved when she took Tylenol and rested. She has had no other problems with speech changes, memory or facial droop.     Post Discharge Medication Reconciliation: discharge medications reconciled, continue medications without change.  Plan of care communicated with patient     Coding guidelines for this visit:  Type of Medical   Decision Making Face-to-Face Visit       within 7 Days of discharge Face-to-Face Visit        within 14 days of discharge   Moderate Complexity [id number]   High Complexity [id number]          - patient is experiencing side effects from Lipitor. No side effects from aspirin.      - the labs from the visit were all negative. One  thinks that it was a migraine.     - patient states she gets the occasional twinge in the center of chest, just over the sternum      ROS:  Constitutional, HEENT, cardiovascular, pulmonary, gi and gu systems are negative, except as otherwise noted.    This document serves as a record of the services and decisions personally performed and made by Nhung Navarro MD. It was created on his behalf by Yoni Tiwari, a trained  "medical scribe. The creation of this document is based the provider's statements to the medical scribe.  Yoni Tiwari 10:02 AM April 5, 2017      OBJECTIVE:                                                    /70  Pulse 76  Temp 97.8  F (36.6  C) (Tympanic)  Ht 5' 4\" (1.626 m)  Wt 161 lb 2 oz (73.1 kg)  BMI 27.66 kg/m2  Body mass index is 27.66 kg/(m^2).     GENERAL: healthy, alert and no distress  EYES: Eyes grossly normal to inspection, conjunctivae and sclerae normal  RESP: lungs clear to auscultation - no rales, rhonchi or wheezes  CV: regular rate and rhythm, normal S1 S2, no murmur  MS: no gross musculoskeletal defects noted, no edema  NEURO: Normal strength and tone, mentation intact and speech normal  PSYCH: mentation appears normal, affect normal/bright         ASSESSMENT/PLAN:                                                      (G45.9) Transient cerebral ischemia, unspecified type  (primary encounter diagnosis)     Comment: I reviewed the hospital records, and also had a more detailed conversation with the patient. Her evaluation was essentially negative. Her symptoms suggested decreased blood flow to certain parts of the brain. Given the onset of headaches prior to this, consider migraine with neurologic features instead of a TIA. Symptoms do appear to have completely resolved.    Plan: I reviewed that there is no easy way of determining the exact etiology of her symptoms. For now, I would manage her blood pressure, cholesterol, and advised a daily aspirin. If symptoms return, this may help establish a pattern of migraines.    (Z71.89) Advanced directives, counseling/discussion       total time spent with pt. was 25 minutes, 20 minutes of the visit was spent discussing the above issues, including pathophysiology, treatment options, and expected outcomes.       There are no Patient Instructions on file for this visit.      Patient will follow up PRN. Patient instructed to call with any " questions or concerns.    The information in this document, created by a scribe for me, accurately reflects the services I personally performed and the decisions made by me. I have reviewed and approved this document for accuracy. 10:05 AM 4/5/2017    Nhung Navarro MD  [facility]

## 2017-04-05 NOTE — NURSING NOTE
"Chief Complaint   Patient presents with     RECHECK       Initial /70  Pulse 76  Temp 97.8  F (36.6  C) (Tympanic)  Ht 5' 4\" (1.626 m)  Wt 161 lb 2 oz (73.1 kg)  BMI 27.66 kg/m2 Estimated body mass index is 27.66 kg/(m^2) as calculated from the following:    Height as of this encounter: 5' 4\" (1.626 m).    Weight as of this encounter: 161 lb 2 oz (73.1 kg).  Medication Reconciliation: complete    "

## 2017-09-20 ENCOUNTER — HOSPITAL ENCOUNTER (OUTPATIENT)
Dept: MAMMOGRAPHY | Facility: CLINIC | Age: 67
Discharge: HOME | End: 2017-09-20
Attending: INTERNAL MEDICINE | Admitting: INTERNAL MEDICINE
Payer: COMMERCIAL

## 2017-09-20 DIAGNOSIS — Z85.3 PERSONAL HISTORY OF MALIGNANT NEOPLASM OF BREAST: ICD-10-CM

## 2017-09-20 PROCEDURE — G0202 SCR MAMMO BI INCL CAD: HCPCS

## 2017-10-04 ENCOUNTER — HOSPITAL ENCOUNTER (OUTPATIENT)
Dept: LAB | Facility: CLINIC | Age: 67
Discharge: HOME | End: 2017-10-04
Attending: INTERNAL MEDICINE | Admitting: INTERNAL MEDICINE
Payer: COMMERCIAL

## 2017-10-04 DIAGNOSIS — Z85.3 PERSONAL HISTORY OF MALIGNANT NEOPLASM OF BREAST: ICD-10-CM

## 2017-10-04 DIAGNOSIS — R97.8 ELEVATED TUMOR MARKERS: ICD-10-CM

## 2017-10-04 LAB — CANCER AG27-29 SERPL-ACNC: 48 U/ML (ref 0–39)

## 2017-10-04 PROCEDURE — 36415 COLL VENOUS BLD VENIPUNCTURE: CPT | Performed by: INTERNAL MEDICINE

## 2017-10-04 PROCEDURE — 86300 IMMUNOASSAY TUMOR CA 15-3: CPT | Performed by: INTERNAL MEDICINE

## 2017-10-16 ENCOUNTER — ONCOLOGY VISIT (OUTPATIENT)
Dept: ONCOLOGY | Facility: CLINIC | Age: 67
End: 2017-10-16
Attending: INTERNAL MEDICINE
Payer: COMMERCIAL

## 2017-10-16 VITALS
HEART RATE: 84 BPM | OXYGEN SATURATION: 95 % | WEIGHT: 160.8 LBS | RESPIRATION RATE: 14 BRPM | SYSTOLIC BLOOD PRESSURE: 145 MMHG | TEMPERATURE: 98.5 F | HEIGHT: 64 IN | BODY MASS INDEX: 27.45 KG/M2 | DIASTOLIC BLOOD PRESSURE: 80 MMHG

## 2017-10-16 DIAGNOSIS — R97.8 ELEVATED TUMOR MARKERS: ICD-10-CM

## 2017-10-16 DIAGNOSIS — Z85.3 PERSONAL HISTORY OF MALIGNANT NEOPLASM OF BREAST: Primary | ICD-10-CM

## 2017-10-16 DIAGNOSIS — Z12.31 SCREENING MAMMOGRAM, ENCOUNTER FOR: ICD-10-CM

## 2017-10-16 PROCEDURE — 99214 OFFICE O/P EST MOD 30 MIN: CPT | Performed by: INTERNAL MEDICINE

## 2017-10-16 PROCEDURE — 99211 OFF/OP EST MAY X REQ PHY/QHP: CPT

## 2017-10-16 ASSESSMENT — PAIN SCALES - GENERAL: PAINLEVEL: NO PAIN (0)

## 2017-10-16 NOTE — PROGRESS NOTES
"CHIEF COMPLAINT AND REASON FOR VISIT: Breast cancer 2009 on  followup.     HISTORY OF PRESENT ILLNESS: Milly Loaiza was diagnosed first time end of  through screening mammogram right breast cancer, lumpectomy 2004 T2N0M0 poorly differentiated infiltrating ductal cancer, ER negative, HER-2 negative. She had 3 cycles of AC, could not tolerate anymore oral chemotherapy, followed by radiation, has been on followup since then.   She had a second breast cancer on the right side diagnosed screening mammogram 2008. Biopsy 2009 at [facility] indicating high-grade DCIS with necrosis, not enough tissue for ER/UT studies. Pathology from lumpectomy 2009 no residual invasive cancer. She has been on followup for both.     PAST MEDICAL HISTORY: Nothing other than breast cancer.     MEDICATIONS: No routine medication other than multivitamin.     ALLERGIES: Sulfa.     SOCIAL HISTORY: She lives with her  at home.  She has a lot of animals to take care of.     FAMILY HISTORY: Paternal grandmother was diagnosed with breast cancer and  from that at age 68 back in the 60s.     HABITS: Never smoked. Does not use alcohol.     REVIEW OF SYSTEMS: Milly Loaiza is in her usual state of health, very active, a middle-aged woman, looks like her stated age. 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 She is taking vitamin D.     PHYSICAL EXAMINATION:   VITAL SIGNS: Blood pressure 145/80, pulse 84, temperature 98.5  F (36.9  C), temperature source Tympanic, resp. rate 14, height 1.626 m (5' 4\"), weight 72.9 kg (160 lb 12.8 oz), SpO2 95 %, not currently breastfeeding.  GENERAL APPEARANCE: Looks like her stated age, not in acute distress.   HEENT: The patient is normocephalic, atraumatic. Pupils are equal react to light. Sclerae are anicteric. Moist oral mucosa. Negative pharynx. No oral thrush. NECK: Supple. No jugular venous distention. Thyroid is not palpable.   LYMPH NODES: Superficial lymphadenopathy is not " appreciable in the bilateral cervical, supraclavicular, axillary or inguinal adenopathy. CARDIOVASCULAR: S1, S2 regular with no murmurs or gallops. No carotid or abdominal bruits. PULMONARY: Lungs are clear to auscultation and percussion bilaterally. There is no wheezing or rhonchi. GASTROINTESTINAL: Abdomen is soft, nontender. No hepatosplenomegaly. No signs of ascites. No mass appreciable. MUSCULOSKELETAL/EXTREMITIES: No edema. No cyanotic changes. No signs of joint deformity. No lymphedema. NEUROLOGIC: Cranial nerves II-XII are grossly intact. Sensation intact. Muscle strength and muscle tone symmetrical all through 5/5. BACK: No spinal or paraspinal tenderness. No CVA tenderness.   SKIN: No petechiae. No rash. No signs of cellulitis.   BREASTS: She has retracted nipple on the right side. No palpable lesion.     CURRENT LAB DATA REVIEWED   LFT, cbc diff are fine.   [id number] at 48 in 10/2017, at 50 in 10/2016, at 36 in 10/2015, at 46 in 12/2014, 46 in 10/2014  from nl, at 41 in 2011.    CURRENT IMAGE REVIEWED  MA 9/2016 - negative.     OLD DATA REVIEW IN SUMMARY:   Mammo 9/2013 - negative.  Chest x-ray 07/2012: Satisfactory.     Chest x-ray 07/2010 was negative.   Mammogram 09/2011 was negative.     ASSESSMENT AND PLAN:   1. Two-time breast cancer survivor. First time was triple negative breast cancer. The second one was DCIS. She is very worried about recurrence since she has experienced that once already.   We talked extensively about lifestyle modification issues, what things she needs to pay attention to in terms of vitamin D, aspirin intake, exercise and weight control, control alcohol intake, etc. She is very motivated and willing to proceed.   She is yrly follow up.     2. Elevation of her tumor marker is fluctuating with unclear etiology.   We discuss the draw back of this test, and what is the proper way to randolph it and ASCO guideline on not doing it.   She is aware of it, yet still wants to do it.

## 2017-10-16 NOTE — PATIENT INSTRUCTIONS
We would like to see you back in 1 year for a follow up appointment with labs and Mammogram prior. When you are in need of a refill, please call your pharmacy and they will send us a request.  Copy of appointments, and after visit summary (AVS) given to patient.  If you have any questions please call Kindra Reagan RN, BSN Oncology Hematology  [facility] [phone]. For questions after business hours, or on holidays/weekends, please call our after hours Nurse Triage line [phone]. Thank you.           1 yr f/u with labs and MA.

## 2017-10-16 NOTE — MR AVS SNAPSHOT
"              After Visit Summary   10/16/2017    Milly Loaiza    MRN: [id number]           Patient Information     Date Of Birth          1950        Visit Information        Provider Department      10/16/2017 11:30 AM Jacinta Lerma MD [facility]        Today's Diagnoses     Personal history of malignant neoplasm of breast    -  1    Screening mammogram, encounter for        Elevated tumor markers          Care Instructions    We would like to see you back in 1 year for a follow up appointment with labs and Mammogram prior. When you are in need of a refill, please call your pharmacy and they will send us a request.  Copy of appointments, and after visit summary (AVS) given to patient.  If you have any questions please call Kindra Reagan RN, BSN Oncology Hematology  [facility] [phone]. For questions after business hours, or on holidays/weekends, please call our after hours Nurse Triage line [phone]. Thank you.           1 yr f/u with labs and MA.          Follow-ups after your visit        Your next 10 appointments already scheduled     Sep 21, 2018 11:45 AM CDT   MA SCREENING BILATERAL W/ JODY with [facility]   [facility] ([facility])    [address] [address] [phone]           Three-dimensional (3D) mammograms are available at [facility] locations in [place], [place], [place], [place], [facility], [place], [place], and [place]. -Health locations include [place] and [facility] [place] in [place]. Benefits of 3D mammograms include: - Improved rate of cancer detection - Decreases your chance of having to go back for more tests, which means fewer: - \"False-positive\" results (This means that there is an abnormal area but it isn't cancer.) - Invasive testing procedures, such as a biopsy or surgery - Can provide clearer images of the breast if you have dense breast tissue. 3D " mammography is an optional exam that anyone can have with a 2D mammogram. It doesn't replace or take the place of a 2D mammogram. 2D mammograms remain an effective screening test for all women.  Not all insurance companies cover the cost of a 3D mammogram. Check with your insurance.            Sep 21, 2018 12:10 PM CDT   LAB with [facility] ([facility])    [address]  [facility] [place], [place] [address]   [phone]           Patient must bring picture ID. Patient should be prepared to give a urine specimen  Please do not eat 10-12 hours before your appointment if you are coming in fasting for labs on lipids, cholesterol, or glucose (sugar). Pregnant women should follow their Care Team instructions. Water with medications is okay. Do not drink coffee or other fluids. If you have concerns about taking  your medications, please ask at office or if scheduling via [employer], send a message by clicking on Secure Messaging, Message Your Care Team.            Oct 01, 2018 11:30 AM CDT   Return Visit with Jacinta Lerma MD   [facility] ([facility])    [facility]  [address]  [facility] [place], [place] [address]   [phone]              Future tests that were ordered for you today     Open Future Orders        Priority Expected Expires Ordered    MA Screen Bilateral w/Francisco Routine 9/1/2018 10/16/2018 10/16/2017    Ca27.29  breast tumor marker Routine 9/1/2018 10/16/2018 10/16/2017    Comprehensive metabolic panel Routine 9/1/2018 10/16/2018 10/16/2017            Who to contact     If you have questions or need follow up information about today's clinic visit or your schedule please contact [facility] directly at [phone].  Normal or non-critical lab and imaging results will be communicated to you by MyChart, letter or phone within 4 business days after the clinic has received the results. If you do not hear from us within 7  "days, please contact the clinic through [employer] or phone. If you have a critical or abnormal lab result, we will notify you by phone as soon as possible.  Submit refill requests through [employer] or call your pharmacy and they will forward the refill request to us. Please allow 3 business days for your refill to be completed.          Additional Information About Your Visit        [employer] Information     [employer] lets you send messages to your doctor, view your test results, renew your prescriptions, schedule appointments and more. To sign up, go to [email or web address] . Click on \"Log in\" on the left side of the screen, which will take you to the Welcome page. Then click on \"Sign up Now\" on the right side of the page.     You will be asked to enter the access code listed below, as well as some personal information. Please follow the directions to create your username and password.     Your access code is: [email or web address]  Expires: 2018 12:00 PM     Your access code will  in 90 days. If you need help or a new code, please call your [place] clinic or [phone].        Care EveryWhere ID     This is your Care EveryWhere ID. This could be used by other organizations to access your [place] medical records  [id number]        Your Vitals Were     Pulse Temperature Respirations Height Pulse Oximetry BMI (Body Mass Index)    84 98.5  F (36.9  C) (Tympanic) 14 1.626 m (5' 4\") 95% 27.6 kg/m2       Blood Pressure from Last 3 Encounters:   10/16/17 145/80   17 126/70   17 135/81    Weight from Last 3 Encounters:   10/16/17 72.9 kg (160 lb 12.8 oz)   17 73.1 kg (161 lb 2 oz)   16 74 kg (163 lb 2 oz)               Primary Care Provider Office Phone # Fax #    Nhung Navarro [phone] [address] [place] [address]        Equal Access to Services     [facility] AH: Cynthia Kilgore, wafelicityda luqadaha, zeenat santamaria" edda rodríguezannabellaemmanuel hawkinsEmaamira ah. So [facility] [phone].    ATENCIÓN: Si habla kalpesh, tiene a rolon disposición servicios gratuitos de asistencia lingüística. [place] al [phone].    We comply with applicable federal civil rights laws and [place] laws. We do not discriminate on the basis of race, color, national origin, age, disability, sex, sexual orientation, or gender identity.            Thank you!     Thank you for choosing [facility]  for your care. Our goal is always to provide you with excellent care. Hearing back from our patients is one way we can continue to improve our services. Please take a few minutes to complete the written survey that you may receive in the mail after your visit with us. Thank you!             Your Updated Medication List - Protect others around you: Learn how to safely use, store and throw away your medicines at www.disposemymeds.org.          This list is accurate as of: 10/16/17 12:00 PM.  Always use your most recent med list.                   Brand Name Dispense Instructions for use Diagnosis    aspirin 325 MG tablet     120 tablet    Take 1 tablet (325 mg) by mouth daily    Transient cerebral ischemia, unspecified type       calcium carbonate 1250 MG tablet    OS-LUCIAN 500 mg Chinik. Ca     Take 1,000 mg by mouth daily        DAILY MULTI VITAMIN/MINERALS PO      1 TABLET DAILY

## 2018-01-08 ENCOUNTER — TELEPHONE (OUTPATIENT)
Dept: FAMILY MEDICINE | Facility: CLINIC | Age: 68
End: 2018-01-08

## 2018-01-08 NOTE — TELEPHONE ENCOUNTER
1/8/2018    Call Regarding Preventive Health Screening Colonoscopy    Attempt 1    Message no voicemail     Comments:       Outreach   Dimple Rader

## 2018-01-31 NOTE — TELEPHONE ENCOUNTER
The Patient declined Preventive Health Screens for: COLONOSCOPY  Please review chart and follow-up with patient if needed.    Thank you

## 2018-03-31 ENCOUNTER — HOSPITAL ENCOUNTER (EMERGENCY)
Facility: CLINIC | Age: 68
Discharge: HOME | End: 2018-03-31
Attending: PHYSICIAN ASSISTANT | Admitting: PHYSICIAN ASSISTANT
Payer: COMMERCIAL

## 2018-03-31 VITALS
OXYGEN SATURATION: 97 % | DIASTOLIC BLOOD PRESSURE: 75 MMHG | RESPIRATION RATE: 16 BRPM | BODY MASS INDEX: 26.98 KG/M2 | TEMPERATURE: 98.1 F | HEIGHT: 64 IN | SYSTOLIC BLOOD PRESSURE: 147 MMHG | WEIGHT: 158 LBS

## 2018-03-31 DIAGNOSIS — R03.0 ELEVATED BLOOD PRESSURE READING WITHOUT DIAGNOSIS OF HYPERTENSION: ICD-10-CM

## 2018-03-31 DIAGNOSIS — K04.7 DENTAL ABSCESS: ICD-10-CM

## 2018-03-31 DIAGNOSIS — R55 VASOVAGAL RESPONSE: ICD-10-CM

## 2018-03-31 PROCEDURE — G0463 HOSPITAL OUTPT CLINIC VISIT: HCPCS | Mod: 25 | Performed by: PHYSICIAN ASSISTANT

## 2018-03-31 PROCEDURE — 99214 OFFICE O/P EST MOD 30 MIN: CPT | Mod: Z6 | Performed by: PHYSICIAN ASSISTANT

## 2018-03-31 PROCEDURE — 41800 DRAINAGE OF GUM LESION: CPT | Performed by: PHYSICIAN ASSISTANT

## 2018-03-31 PROCEDURE — 41800 DRAINAGE OF GUM LESION: CPT | Mod: Z6 | Performed by: PHYSICIAN ASSISTANT

## 2018-03-31 RX ORDER — PENICILLIN V POTASSIUM 500 MG/1
500 TABLET, FILM COATED ORAL 4 TIMES DAILY
Qty: 40 TABLET | Refills: 0 | Status: SHIPPED | OUTPATIENT
Start: 2018-03-31 | End: 2018-04-10

## 2018-03-31 NOTE — ED AVS SNAPSHOT
[facility] Emergency Department    [address] [place] [address]    Phone:  [phone]    Fax:  [phone]                                       Milly Loaiza   MRN: [id number]    Department:  [facility] Emergency Department   Date of Visit:  3/31/2018           Patient Information     Date Of Birth          1950        Your diagnoses for this visit were:     Dental abscess        You were seen by Luna York PA-C.      Follow-up Information     Follow up with Nhung Navarro MD.    Specialty:  Family Practice    Why:  As needed, If symptoms worsen    Contact information:    [address]   [place] [address]  [phone]          Discharge Instructions         Dental Abscess  An abscess is a sac of pus. A dental abscess forms when a tooth or the tissue around it becomes infected with bacteria. The bacteria can enter through a cavity or a crack in a tooth. It can also infect the gum tissue or bone around a tooth. An untreated abscess can cause the loss of the tooth. It can even spread to other parts of the body and become life-threatening.    Symptoms of a dental abscess   Signs of a dental abscess include:    Toothache, often severe    Tooth pain with hot, cold, or pressure    Pain in the gums, cheek, or jaw    Bad breath or bitter taste in the mouth    Trouble swallowing or opening the mouth    Fever    Swollen or enlarged glands in the neck  Diagnosing a dental abscess  An abscess is diagnosed by looking at your teeth and gums. You will be told if any tests are needed, such as dental X-rays.  Treating a dental abscess  Treatments for a dental abscess may include the following:    Antibiotic medicines. These treat the underlying infection.    Pain relievers. These help you feel more comfortable. Your healthcare provider may prescribe a medicine for you. Or you may use over-the-counter pain relievers, such as acetaminophen or ibuprofen.    Warm saltwater rinses. These  can soothe discomfort and help clear away pus.    Root canal surgery.  This may be done if needed to save the tooth. With a root canal, the infected part of the tooth is removed. A special substance is then used to fill the empty space in the tooth.    Draining the abscess. This may be doneif needed. Incisions are made to allow the infected material to drain from the tooth.    Removing the tooth. This is done in cases of severe infection that can t be treated another way.  You may need to be admitted to a hospital if the infection is severe, has spread, or doesn t respond to treatment.     When to call the dentist  Call your dentist right away if you have any of the following:    Fever of 100.4 F (38 C) or higher    Increased pain, redness, drainage, or swelling in the treated area    Swelling of the face or jawbone    Pain that can't be controlled with medicines   Preventing dental abscess  To prevent another abscess in the future, keep your teeth clean and healthy. Brush twice a day and floss at least once daily. See your dentist for regular tooth cleanings. And stay away from sugary foods and drinks that can lead to tooth decay.  Date Last Reviewed: 6/1/2017 2000-2017 [employer]. [address] [place] [address]. All rights reserved. This information is not intended as a substitute for professional medical care. Always follow your healthcare professional's instructions.          Your next 10 appointments already scheduled     Sep 21, 2018 11:45 AM CDT   [place] SCREENING BILATERAL W/ JODY with [facility]   [facility] ([facility])    [address] [facility] [address] [address]   [phone]           Three-dimensional (3D) mammograms are available at [facility] locations in [place], [place], [place], [place], [facility], [place], [place], and [place]. -[facility] locations include [place] and [facility] in [place]. Benefits of 3D mammograms  "include: - Improved rate of cancer detection - Decreases your chance of having to go back for more tests, which means fewer: - \"False-positive\" results (This means that there is an abnormal area but it isn't cancer.) - Invasive testing procedures, such as a biopsy or surgery - Can provide clearer images of the breast if you have dense breast tissue. 3D mammography is an optional exam that anyone can have with a 2D mammogram. It doesn't replace or take the place of a 2D mammogram. 2D mammograms remain an effective screening test for all women.  Not all insurance companies cover the cost of a 3D mammogram. Check with your insurance.            Sep 21, 2018 12:10 PM CDT   LAB with [facility] ([facility])    [address] [address]   [phone]           Please do not eat 10-12 hours before your appointment if you are coming in fasting for labs on lipids, cholesterol, or glucose (sugar). This does not apply to pregnant women. Water, hot tea and black coffee (with nothing added) are okay. Do not drink other fluids, diet soda or chew gum.            Oct 01, 2018 11:30 AM CDT   Return Visit with Jacinta Lerma MD   [facility] ([facility])    [facility]  [address]  [facility] [address]   [phone]              24 Hour Appointment Hotline       To make an appointment at any [facility], call [phone] ([phone]). If you don't have a family doctor or clinic, we will help you find one. [facility] clinics are conveniently located to serve the needs of you and your family.             Review of your medicines      START taking        Dose / Directions Last dose taken    penicillin V potassium 500 MG tablet   Commonly known as:  VEETID   Dose:  500 mg   Quantity:  40 tablet        Take 1 tablet (500 mg) by mouth 4 times daily for 10 days   Refills:  0          Our records show that you are " taking the medicines listed below. If these are incorrect, please call your family doctor or clinic.        Dose / Directions Last dose taken    aspirin 325 MG tablet   Dose:  325 mg   Quantity:  120 tablet        Take 1 tablet (325 mg) by mouth daily   Refills:  3        calcium carbonate 1250 MG tablet   Commonly known as:  OS-LUCIAN 500 mg [place]. [place]   Dose:  1000 mg        Take 1,000 mg by mouth daily   Refills:  0        DAILY MULTI VITAMIN/MINERALS PO        1 TABLET DAILY   Refills:  0                Prescriptions were sent or printed at these locations (1 Prescription)                   [facility] [place], [place] - [address]   [address] [place] [facility] [address]    Telephone:  [phone]   Fax:  [phone]   Hours:                  E-Prescribed (1 of 1)         penicillin V potassium (VEETID) 500 MG tablet                Orders Needing Specimen Collection     None      Pending Results     No orders found from 3/29/2018 to 4/1/2018.            Pending Culture Results     No orders found from 3/29/2018 to 4/1/2018.            Pending Results Instructions     If you had any lab results that were not finalized at the time of your Discharge, you can call the ED Lab Result RN at [phone]. You will be contacted by this team for any positive Lab results or changes in treatment. The nurses are available 7 days a week from 10A to 6:30P.  You can leave a message 24 hours per day and they will return your call.        Test Results From Your Hospital Stay               Thank you for choosing [facility]       Thank you for choosing [facility] for your care. Our goal is always to provide you with excellent care. Hearing back from our patients is one way we can continue to improve our services. Please take a few minutes to complete the written survey that you may receive in the mail after you visit with us. Thank you!        [facility] Information     [employer] lets you send messages to your doctor, view  "your test results, renew your prescriptions, schedule appointments and more. To sign up, go to [email or web address] . Click on \"Log in\" on the left side of the screen, which will take you to the Welcome page. Then click on \"Sign up Now\" on the right side of the page.     You will be asked to enter the access code listed below, as well as some personal information. Please follow the directions to create your username and password.     Your access code is: [email or web address]  Expires: 2018  7:30 PM     Your access code will  in 90 days. If you need help or a new code, please call your [facility] clinic or [phone].        Care EveryWhere ID     This is your Care EveryWhere ID. This could be used by other organizations to access your [facility] medical records  [id number]        Equal Access to Services     MAGGIE [facility] Children's HospitalPIETER : Cynthia Kilgore, jennyfer melgar, javier lopezaltasia olivera, zeenat pickens . So [facility] [phone].    ATENCIÓN: Si habla español, tiene a rolon disposición servicios gratuitos de asistencia lingüística. Llame al [phone].    We comply with applicable federal civil rights laws and [place] laws. We do not discriminate on the basis of race, color, national origin, age, disability, sex, sexual orientation, or gender identity.            After Visit Summary       This is your record. Keep this with you and show to your community pharmacist(s) and doctor(s) at your next visit.                  "

## 2018-03-31 NOTE — ED AVS SNAPSHOT
[facility] Emergency Department    [address] [address]    Phone:  [phone]    Fax:  [phone]                                       Milly Loaiza   MRN: [id number]    Department:  [facility] Emergency Department   Date of Visit:  3/31/2018           After Visit Summary Signature Page     I have received my discharge instructions, and my questions have been answered. I have discussed any challenges I see with this plan with the nurse or doctor.    ..........................................................................................................................................  Patient/Patient Representative Signature      ..........................................................................................................................................  Patient Representative Print Name and Relationship to Patient    ..................................................               ................................................  Date                                            Time    ..........................................................................................................................................  Reviewed by Signature/Title    ...................................................              ..............................................  Date                                                            Time

## 2018-04-01 NOTE — ED PROVIDER NOTES
History     Chief Complaint   Patient presents with     Dental Pain     abcess in tooth for 2 days now.     HPI  Milly Loaiza is a 67 year old female who is in urgent care with concern over possible dental abscess.  For the last 2 days patient has mild left upper dental pain, tenderness palpation and localized swelling and has had a foul taste in the mouth.  She denies any other focal complaints.  She has not had any recent fevers, chills, myalgias, overlying rashes or skin changes, sore throat, cough, dyspnea, wheezing.  She has attempted to treat with Tylenol 650 mg every 3 hours and a single salt water gargle.  She does have a primary dentist however has not seen them in the last 3 years.  She denies any prior history of hypertension states that when she is in pain or anxious it becomes elevated.  She has not had any current headache, dizziness, lightheadedness, vision changes, chest pain, cough, palpitations, shortness of breath,     Problem List:    Patient Active Problem List    Diagnosis Date Noted     Advanced directives, counseling/discussion 04/05/2017     Priority: Medium     Advance Care Planning 4/5/2017: Information declined             TIA (transient ischemic attack) 03/29/2017     Priority: Medium     CARDIOVASCULAR SCREENING; LDL GOAL LESS THAN 160 10/31/2010     Priority: Medium     DCIS (ductal carcinoma in situ) 03/27/2009     Priority: Medium     Malignant neoplasm of female breast (H) 05/27/2005     Priority: Medium     Problem list name updated by automated process. Provider to review          Past Medical History:    Past Medical History:   Diagnosis Date     Malignant neoplasm of breast (female), unspecified site        Past Surgical History:    Past Surgical History:   Procedure Laterality Date     SURGICAL HISTORY OF -   2003    Cervical biopsy     SURGICAL HISTORY OF -   1979    Tubal ligation     SURGICAL HISTORY OF -   2/2/2004    Right mastectomy     SURGICAL HISTORY OF -    "3-23-09    Needle-guided left breast biopsy.       Family History:    Family History   Problem Relation Age of Onset     Alcohol/Drug Mother      Respiratory Mother      emphazyma     CEREBROVASCULAR DISEASE Maternal Grandmother      OSTEOPOROSIS Maternal Grandmother      Breast Cancer Paternal Grandmother      Depression Sister      DIABETES Other      CEREBROVASCULAR DISEASE Other      CANCER Other      lymphademia     Respiratory Other      emphazyma     Thyroid Disease Sister        Social History:  Marital Status:   [2]  Social History   Substance Use Topics     Smoking status: Never Smoker     Smokeless tobacco: Not on file     Alcohol use Yes      Comment: occ.        Medications:      aspirin 325 MG tablet   calcium carbonate (OS-LUCIAN 500 MG [place]. [place]) 500 MG tablet   DAILY MULTI VITAMIN/MINERALS OR         Review of Systems  CONSTITUTIONAL:NEGATIVE for fever, chills, change in weight  INTEGUMENTARY/SKIN: NEGATIVE for worrisome rashes, moles or lesions  EYES: NEGATIVE for vision changes or irritation  ENT/MOUTH: POSITIVE for left upper dental pain and NEGATIVE for nasal congestion, sore throat, ear pain  RESP:NEGATIVE for significant cough or SOB  Physical Exam   BP: (!) 195/106  Heart Rate: 75  Temp: 98.1  F (36.7  C)  Resp: 16  Height: 163.2 cm (5' 4.25\")  Weight: 71.7 kg (158 lb)  SpO2: 97 %  Physical Exam   GENERAL APPEARANCE: healthy, alert and no distress  EYES: EOMI,  PERRL, conjunctiva clear  HENT: ear canals and TM's normal.  Nasal mucosa moist.  Posterior pharynx nonerythematous without exudate.  There is some tenderness palpation of teeth 13 and 14.  There is some mild associated soft tissue gum swelling around tooth 13  NECK: supple, nontender, no lymphadenopathy  RESP: lungs clear to auscultation - no rales, rhonchi or wheezes  CV: regular rates and rhythm, normal S1 S2, no murmur noted  SKIN: no suspicious lesions or rashes  ED Course     ED Course     Incision + drainage  Date/Time: " 4/1/2018 3:18 PM  Performed by: SHANNAN JOSEPH  Authorized by: SHANNAN JOSEPH   Consent: Verbal consent obtained.  Risks and benefits: risks, benefits and alternatives were discussed  Consent given by: patient  Patient identity confirmed: verbally with patient  Type: abscess  Location: dental, tooth number 13.    Anesthesia:  Local Anesthetic: topical anesthetic  Needle gauge: 18  Complexity: simple  Drainage: bloody  Drainage amount: scant  Patient tolerance: Patient tolerated the procedure well with no immediate complications              Critical Care time:  none        No results found for this or any previous visit (from the past 24 hour(s)).    Medications - No data to display    Assessments & Plan (with Medical Decision Making)     I have reviewed the nursing notes.    I have reviewed the findings, diagnosis, plan and need for follow up with the patient.     Discharge Medication List as of 3/31/2018  7:30 PM      START taking these medications    Details   penicillin V potassium (VEETID) 500 MG tablet Take 1 tablet (500 mg) by mouth 4 times daily for 10 days, Disp-40 tablet, R-0, E-Prescribe           Final diagnoses:   Dental abscess   Elevated blood pressure reading without diagnosis of hypertension   Vasovagal response     67-year-old female presents to urgent care with concern over her left upper dental pain which began within the last 2 days.  She was noted to be significantly hypertensive upon arrival, remainder vital signs within normal limits.  Physical exam findings as described above are most consistent with dental abscess.  Did discuss her/benefits of attempting incision and drainage and patient agreed to proceed.  Her gum was numbed with topical agent and an 18 gauge needles was used to puncture area of fluctuance, minimal amount of bloody discharge was obtained, no purulent material noted.  Patient did begin to feel dizzy/lightheaded after procedure.  This is common for her she has had previous  vasovagal reactions in the past.  She was placed supine on the bed and blood pressure was recheck and noted to be low.  After approximately 20-30 minutes she had returned to her baseline and blood pressure had setteled into more normal levels.  She was discharged home stable with prescription for penicillin.  She is instructed to use symptomatic treatment with ice/heat and ibuprofen/tylenol.  Follow up with dentist as soon as possible.  Differential for her pain would include caries/decay, pulpitis.  Worrisome reasons to return to ER/UC sooner discussed.     Disclaimer: This note consists of symbols derived from keyboarding, dictation, and/or voice recognition software. As a result, there may be errors in the script that have gone undetected.  Please consider this when interpreting information found in the chart.    3/31/2018   [facility] EMERGENCY DEPARTMENT     Luna York PA-C  04/01/18 1517       Luna York PA-C  04/01/18 1515

## 2018-04-01 NOTE — DISCHARGE INSTRUCTIONS
Dental Abscess  An abscess is a sac of pus. A dental abscess forms when a tooth or the tissue around it becomes infected with bacteria. The bacteria can enter through a cavity or a crack in a tooth. It can also infect the gum tissue or bone around a tooth. An untreated abscess can cause the loss of the tooth. It can even spread to other parts of the body and become life-threatening.    Symptoms of a dental abscess   Signs of a dental abscess include:    Toothache, often severe    Tooth pain with hot, cold, or pressure    Pain in the gums, cheek, or jaw    Bad breath or bitter taste in the mouth    Trouble swallowing or opening the mouth    Fever    Swollen or enlarged glands in the neck  Diagnosing a dental abscess  An abscess is diagnosed by looking at your teeth and gums. You will be told if any tests are needed, such as dental X-rays.  Treating a dental abscess  Treatments for a dental abscess may include the following:    Antibiotic medicines. These treat the underlying infection.    Pain relievers. These help you feel more comfortable. Your healthcare provider may prescribe a medicine for you. Or you may use over-the-counter pain relievers, such as acetaminophen or ibuprofen.    Warm saltwater rinses. These can soothe discomfort and help clear away pus.    Root canal surgery.  This may be done if needed to save the tooth. With a root canal, the infected part of the tooth is removed. A special substance is then used to fill the empty space in the tooth.    Draining the abscess. This may be doneif needed. Incisions are made to allow the infected material to drain from the tooth.    Removing the tooth. This is done in cases of severe infection that can t be treated another way.  You may need to be admitted to a hospital if the infection is severe, has spread, or doesn t respond to treatment.     When to call the dentist  Call your dentist right away if you have any of the following:    Fever of 100.4 F (38 C) or  higher    Increased pain, redness, drainage, or swelling in the treated area    Swelling of the face or jawbone    Pain that can't be controlled with medicines   Preventing dental abscess  To prevent another abscess in the future, keep your teeth clean and healthy. Brush twice a day and floss at least once daily. See your dentist for regular tooth cleanings. And stay away from sugary foods and drinks that can lead to tooth decay.  Date Last Reviewed: 6/1/2017 2000-2017 [employer]. [address] [place] [address], [place] [address]. All rights reserved. This information is not intended as a substitute for professional medical care. Always follow your healthcare professional's instructions.      Many of these clinics offer a sliding fee option for patients that qualify, and see patients on a walk-in or same day basis. Please call each clinic directly. As services, hours, fees and policies vary greatly.          [facility], [facility]  [phone]  Sees no insurance  [facility], [place]  [phone]  Preventive services only  [facility] (mult loc) [phone]  [facility]    ([facility]), [facility]  [phone]  [facility]       [phone]  Preventive services only  [facility]  [phone]  Accepts [place] & sees no ins  [facility],      Accepts [place] & sees no ins   [place]   [phone]; [phone]  [facility]   Accepts [place] & sees no ins       [phone]  [facility]  [phone]   Accepts [place] emergencies  [facility] [phone]  [facility],     Accepts [place]   [place]   [phone]    [facility] [phone]  Accepts [place] & sees no ins   [facility]   Dental Clinic    [phone]  [facility]  [phone]   [facility] [phone]  Jun  [facility]  Preventive services only   [place]   [phone]  [facility] [phone]  [facility], [place]  [phone]  Same day [facility] [phone]  Same day [facility],      Same day [facility]   [phone]    [facility], [facility] [phone]  Free clinic, walk-in only  [facility] (multiple locations) [phone]      [facility] , [facility] [phone]    [facility] [phone]  Free clinic, walk-in only  [facility]  [phone]  [facility] [phone] (adults)       [phone] (children)  [facility] [phone]    Also, referral service for low cost dental and healthcare: [phone]  And [phone]

## 2018-07-31 ENCOUNTER — TELEPHONE (OUTPATIENT)
Dept: FAMILY MEDICINE | Facility: CLINIC | Age: 68
End: 2018-07-31

## 2018-07-31 NOTE — LETTER
July 31, 2018      Milly Loaiza  [address] [facility] [address]        Dear Milly,     As part of [facility]'s commitment to health and wellness we have reviewed your chart and it indicates that you are due for one or more of the following:    -- Annual physical.    -- Lab tests: You are due for the following: Lipid profile. Plan to come fasting 8-10 hours prior to your appointment - nothing to eat or drink EXCEPT water and your medications.    Please try to schedule and/or complete the tests above within the next 2-4 weeks.   The number to call to schedule an appointment at [facility] is [phone].    While we work hard to maintain accurate records, it is always possible that this notice does not accurately reflect tests that you may have had. To ensure that we do not send you unnecessary notices please verify that we have accurate dates of your tests, even if these were done many years ago.     Thank you for choosing [facility] for your healthcare needs.      Sincerely,     Nhung Navarro MD

## 2018-07-31 NOTE — TELEPHONE ENCOUNTER
Panel Management Review      Patient has the following on her problem list: None      Composite cancer screening  Chart review shows that this patient is due/due soon for the following Colonoscopy  Summary:    Patient is due/failing the following:   COLONOSCOPY    Action needed:   Patient needs office visit for physical and fasting labs. Will address need for colonoscopy at visit    Type of outreach:    Sent letter.    Questions for provider review:    None                                                                                                                                    Tequila Elizalde CMA

## 2018-09-21 ENCOUNTER — HOSPITAL ENCOUNTER (EMERGENCY)
Facility: CLINIC | Age: 68
Discharge: HOME | End: 2018-09-21
Attending: EMERGENCY MEDICINE | Admitting: EMERGENCY MEDICINE
Payer: MEDICARE

## 2018-09-21 ENCOUNTER — HOSPITAL ENCOUNTER (OUTPATIENT)
Dept: MAMMOGRAPHY | Facility: CLINIC | Age: 68
Discharge: HOME | End: 2018-09-21
Attending: INTERNAL MEDICINE | Admitting: INTERNAL MEDICINE
Payer: MEDICARE

## 2018-09-21 ENCOUNTER — APPOINTMENT (OUTPATIENT)
Dept: MRI IMAGING | Facility: CLINIC | Age: 68
End: 2018-09-21
Attending: EMERGENCY MEDICINE
Payer: MEDICARE

## 2018-09-21 ENCOUNTER — APPOINTMENT (OUTPATIENT)
Dept: CT IMAGING | Facility: CLINIC | Age: 68
End: 2018-09-21
Attending: EMERGENCY MEDICINE
Payer: MEDICARE

## 2018-09-21 ENCOUNTER — HOSPITAL ENCOUNTER (OUTPATIENT)
Dept: LAB | Facility: CLINIC | Age: 68
End: 2018-09-21
Attending: INTERNAL MEDICINE
Payer: MEDICARE

## 2018-09-21 VITALS
DIASTOLIC BLOOD PRESSURE: 101 MMHG | RESPIRATION RATE: 18 BRPM | TEMPERATURE: 96 F | SYSTOLIC BLOOD PRESSURE: 175 MMHG | OXYGEN SATURATION: 96 %

## 2018-09-21 DIAGNOSIS — Z85.3 PERSONAL HISTORY OF MALIGNANT NEOPLASM OF BREAST: ICD-10-CM

## 2018-09-21 DIAGNOSIS — R41.82 ALTERED MENTAL STATUS, UNSPECIFIED ALTERED MENTAL STATUS TYPE: ICD-10-CM

## 2018-09-21 DIAGNOSIS — Z12.31 SCREENING MAMMOGRAM, ENCOUNTER FOR: ICD-10-CM

## 2018-09-21 DIAGNOSIS — E87.6 HYPOKALEMIA: ICD-10-CM

## 2018-09-21 LAB
ALBUMIN SERPL-MCNC: 3.8 G/DL (ref 3.4–5)
ALP SERPL-CCNC: 141 U/L (ref 40–150)
ALT SERPL W P-5'-P-CCNC: 24 U/L (ref 0–50)
ANION GAP SERPL CALCULATED.3IONS-SCNC: 8 MMOL/L (ref 3–14)
ANION GAP SERPL CALCULATED.3IONS-SCNC: 9 MMOL/L (ref 3–14)
APTT PPP: 28 SEC (ref 22–37)
AST SERPL W P-5'-P-CCNC: 32 U/L (ref 0–45)
BASOPHILS # BLD AUTO: 0.1 10E9/L (ref 0–0.2)
BASOPHILS NFR BLD AUTO: 1.1 %
BILIRUB SERPL-MCNC: 0.3 MG/DL (ref 0.2–1.3)
BUN SERPL-MCNC: 7 MG/DL (ref 7–30)
BUN SERPL-MCNC: 7 MG/DL (ref 7–30)
CALCIUM SERPL-MCNC: 8.5 MG/DL (ref 8.5–10.1)
CALCIUM SERPL-MCNC: 8.7 MG/DL (ref 8.5–10.1)
CANCER AG27-29 SERPL-ACNC: 44 U/ML (ref 0–39)
CHLORIDE SERPL-SCNC: 106 MMOL/L (ref 94–109)
CHLORIDE SERPL-SCNC: 109 MMOL/L (ref 94–109)
CO2 SERPL-SCNC: 23 MMOL/L (ref 20–32)
CO2 SERPL-SCNC: 25 MMOL/L (ref 20–32)
CREAT SERPL-MCNC: 0.78 MG/DL (ref 0.52–1.04)
CREAT SERPL-MCNC: 0.83 MG/DL (ref 0.52–1.04)
DIFFERENTIAL METHOD BLD: NORMAL
EOSINOPHIL # BLD AUTO: 0.3 10E9/L (ref 0–0.7)
EOSINOPHIL NFR BLD AUTO: 3.3 %
ERYTHROCYTE [DISTWIDTH] IN BLOOD BY AUTOMATED COUNT: 12.6 % (ref 10–15)
GFR SERPL CREATININE-BSD FRML MDRD: 68 ML/MIN/1.7M2
GFR SERPL CREATININE-BSD FRML MDRD: 73 ML/MIN/1.7M2
GLUCOSE BLDC GLUCOMTR-MCNC: 109 MG/DL (ref 70–99)
GLUCOSE SERPL-MCNC: 109 MG/DL (ref 70–99)
GLUCOSE SERPL-MCNC: 121 MG/DL (ref 70–99)
HCT VFR BLD AUTO: 41.5 % (ref 35–47)
HGB BLD-MCNC: 13.9 G/DL (ref 11.7–15.7)
IMM GRANULOCYTES # BLD: 0 10E9/L (ref 0–0.4)
IMM GRANULOCYTES NFR BLD: 0.4 %
INR PPP: 0.96 (ref 0.86–1.14)
LYMPHOCYTES # BLD AUTO: 2.8 10E9/L (ref 0.8–5.3)
LYMPHOCYTES NFR BLD AUTO: 34 %
MCH RBC QN AUTO: 29.5 PG (ref 26.5–33)
MCHC RBC AUTO-ENTMCNC: 33.5 G/DL (ref 31.5–36.5)
MCV RBC AUTO: 88 FL (ref 78–100)
MONOCYTES # BLD AUTO: 0.7 10E9/L (ref 0–1.3)
MONOCYTES NFR BLD AUTO: 8.2 %
NEUTROPHILS # BLD AUTO: 4.4 10E9/L (ref 1.6–8.3)
NEUTROPHILS NFR BLD AUTO: 53 %
NRBC # BLD AUTO: 0 10*3/UL
NRBC BLD AUTO-RTO: 0 /100
PLATELET # BLD AUTO: 249 10E9/L (ref 150–450)
POTASSIUM SERPL-SCNC: 2.8 MMOL/L (ref 3.4–5.3)
POTASSIUM SERPL-SCNC: 3.4 MMOL/L (ref 3.4–5.3)
PROT SERPL-MCNC: 7.7 G/DL (ref 6.8–8.8)
RBC # BLD AUTO: 4.71 10E12/L (ref 3.8–5.2)
SODIUM SERPL-SCNC: 140 MMOL/L (ref 133–144)
SODIUM SERPL-SCNC: 140 MMOL/L (ref 133–144)
TROPONIN I SERPL-MCNC: <0.015 UG/L (ref 0–0.04)
WBC # BLD AUTO: 8.3 10E9/L (ref 4–11)

## 2018-09-21 PROCEDURE — 77063 BREAST TOMOSYNTHESIS BI: CPT

## 2018-09-21 PROCEDURE — 86300 IMMUNOASSAY TUMOR CA 15-3: CPT | Performed by: INTERNAL MEDICINE

## 2018-09-21 PROCEDURE — 96361 HYDRATE IV INFUSION ADD-ON: CPT

## 2018-09-21 PROCEDURE — 25500064 ZZH RX 255 OP 636: Performed by: EMERGENCY MEDICINE

## 2018-09-21 PROCEDURE — 99292 CRITICAL CARE ADDL 30 MIN: CPT | Mod: Z6 | Performed by: EMERGENCY MEDICINE

## 2018-09-21 PROCEDURE — 85730 THROMBOPLASTIN TIME PARTIAL: CPT | Performed by: EMERGENCY MEDICINE

## 2018-09-21 PROCEDURE — 93010 ELECTROCARDIOGRAM REPORT: CPT | Mod: Z6 | Performed by: EMERGENCY MEDICINE

## 2018-09-21 PROCEDURE — 25000125 ZZHC RX 250: Performed by: EMERGENCY MEDICINE

## 2018-09-21 PROCEDURE — 96365 THER/PROPH/DIAG IV INF INIT: CPT | Mod: 59

## 2018-09-21 PROCEDURE — 70496 CT ANGIOGRAPHY HEAD: CPT

## 2018-09-21 PROCEDURE — 25000128 H RX IP 250 OP 636: Performed by: EMERGENCY MEDICINE

## 2018-09-21 PROCEDURE — 80048 BASIC METABOLIC PNL TOTAL CA: CPT | Performed by: EMERGENCY MEDICINE

## 2018-09-21 PROCEDURE — 99291 CRITICAL CARE FIRST HOUR: CPT | Mod: 25 | Performed by: EMERGENCY MEDICINE

## 2018-09-21 PROCEDURE — 96372 THER/PROPH/DIAG INJ SC/IM: CPT

## 2018-09-21 PROCEDURE — 00000146 ZZHCL STATISTIC GLUCOSE BY METER IP

## 2018-09-21 PROCEDURE — 70553 MRI BRAIN STEM W/O & W/DYE: CPT

## 2018-09-21 PROCEDURE — 70450 CT HEAD/BRAIN W/O DYE: CPT | Mod: XS

## 2018-09-21 PROCEDURE — 85610 PROTHROMBIN TIME: CPT | Performed by: EMERGENCY MEDICINE

## 2018-09-21 PROCEDURE — 80053 COMPREHEN METABOLIC PANEL: CPT | Performed by: INTERNAL MEDICINE

## 2018-09-21 PROCEDURE — 96375 TX/PRO/DX INJ NEW DRUG ADDON: CPT | Mod: 59

## 2018-09-21 PROCEDURE — A9585 GADOBUTROL INJECTION: HCPCS | Performed by: EMERGENCY MEDICINE

## 2018-09-21 PROCEDURE — 85025 COMPLETE CBC W/AUTO DIFF WBC: CPT | Performed by: EMERGENCY MEDICINE

## 2018-09-21 PROCEDURE — 93005 ELECTROCARDIOGRAM TRACING: CPT

## 2018-09-21 PROCEDURE — 36415 COLL VENOUS BLD VENIPUNCTURE: CPT | Performed by: INTERNAL MEDICINE

## 2018-09-21 PROCEDURE — 99285 EMERGENCY DEPT VISIT HI MDM: CPT | Mod: 25

## 2018-09-21 PROCEDURE — 84484 ASSAY OF TROPONIN QUANT: CPT | Performed by: EMERGENCY MEDICINE

## 2018-09-21 RX ORDER — KETAMINE HYDROCHLORIDE 100 MG/ML
400 INJECTION, SOLUTION INTRAMUSCULAR; INTRAVENOUS ONCE
Status: COMPLETED | OUTPATIENT
Start: 2018-09-21 | End: 2018-09-21

## 2018-09-21 RX ORDER — POTASSIUM CHLORIDE 7.45 MG/ML
10 INJECTION INTRAVENOUS ONCE
Status: COMPLETED | OUTPATIENT
Start: 2018-09-21 | End: 2018-09-21

## 2018-09-21 RX ORDER — IOPAMIDOL 755 MG/ML
70 INJECTION, SOLUTION INTRAVASCULAR ONCE
Status: COMPLETED | OUTPATIENT
Start: 2018-09-21 | End: 2018-09-21

## 2018-09-21 RX ORDER — ONDANSETRON 4 MG/1
4 TABLET, ORALLY DISINTEGRATING ORAL EVERY 8 HOURS PRN
Qty: 10 TABLET | Refills: 0 | Status: SHIPPED | OUTPATIENT
Start: 2018-09-21 | End: 2018-09-24

## 2018-09-21 RX ORDER — LIDOCAINE 40 MG/G
CREAM TOPICAL
Status: DISCONTINUED | OUTPATIENT
Start: 2018-09-21 | End: 2018-09-21 | Stop reason: HOSPADM

## 2018-09-21 RX ORDER — GADOBUTROL 604.72 MG/ML
7 INJECTION INTRAVENOUS ONCE
Status: COMPLETED | OUTPATIENT
Start: 2018-09-21 | End: 2018-09-21

## 2018-09-21 RX ORDER — ONDANSETRON 2 MG/ML
4 INJECTION INTRAMUSCULAR; INTRAVENOUS EVERY 30 MIN PRN
Status: DISCONTINUED | OUTPATIENT
Start: 2018-09-21 | End: 2018-09-21 | Stop reason: HOSPADM

## 2018-09-21 RX ADMIN — SODIUM CHLORIDE 100 ML: 9 INJECTION, SOLUTION INTRAVENOUS at 13:43

## 2018-09-21 RX ADMIN — GADOBUTROL 7 ML: 604.72 INJECTION INTRAVENOUS at 16:12

## 2018-09-21 RX ADMIN — KETAMINE HYDROCHLORIDE 400 MG: 100 INJECTION INTRAMUSCULAR; INTRAVENOUS at 13:29

## 2018-09-21 RX ADMIN — SODIUM CHLORIDE 1000 ML: 9 INJECTION, SOLUTION INTRAVENOUS at 14:02

## 2018-09-21 RX ADMIN — IOPAMIDOL 70 ML: 755 INJECTION, SOLUTION INTRAVENOUS at 13:43

## 2018-09-21 RX ADMIN — ONDANSETRON 4 MG: 2 INJECTION INTRAMUSCULAR; INTRAVENOUS at 16:45

## 2018-09-21 RX ADMIN — POTASSIUM CHLORIDE 10 MEQ: 7.46 INJECTION, SOLUTION INTRAVENOUS at 14:48

## 2018-09-21 NOTE — ED AVS SNAPSHOT
[facility]    [address] [address]    Phone:  [phone]    Fax:  [phone]                                       Milly Loaiza   MRN: [id number]    Department:  [facility]   Date of Visit:  9/21/2018           Patient Information     Date Of Birth          1950        Your diagnoses for this visit were:     Altered mental status, unspecified altered mental status type     Hypokalemia        You were seen by Alfonzo Cesar MD.        Discharge Instructions       Follow up with primary doctor as needed.   Return if worsening symptoms.   Follow up in about 1 week for recheck of potassium.           Confusion  Confusion is a change in a person s ability to think clearly. There may be trouble recognizing familiar people and places or knowing what day it is. Memory, judgment, and decision-making may also be affected. In severe cases, the person may have limited or no response to being spoken to.  Confusion is usually a sign of an underlying problem. It may occur suddenly. Or it may develop gradually over time. Causes of confusion include brain injury, medicines, alcohol, withdrawal from certain medicines or illegal drugs, and infection. Heart attack and stroke may cause it. Confusion can also be a sign of dementia or a mental illness.  Treatment will depend on the cause of the problem. If the issue is a medicine, stopping the medicine may help. The healthcare provider will perform an evaluation and certain tests may be done. Follow up with the healthcare provider for the results.  Home care    Be sure someone is with the confused person at all times. He or she should not be left alone or unsupervised.    Tell the healthcare provider about all medicines that the person takes. These include prescription, over-the-counter, herbs, and supplements.    Dehydration can increase confusion. Ask the healthcare provider how much fluid the  person should be drinking. Offer liquids and ensure that they are taken.    Keep all medicines in a secure place under the caregiver s control. To prevent overdose, a confused person should take medicines only under the supervision of a caregiver.    To help a person with confusion:  ? Establish a daily routine. Change can be a source of stress for someone with confusion. Make and keep a time schedule for common tasks such as bathing, dressing, taking medicines, meals, going for walks, shopping, naps and bed time.  ? Speak slowly and clearly with a gentle tone of voice. Use short simple words and sentences. Ask one question at a time. Do not interrupt, criticize or argue. Be calm and supportive. Use friendly facial expressions. Use pointing and touching to help communicate. If there has been loss of long-term memory, do not ask questions about past events. This would only cause frustration for the person.  ? Use lists, signs, family photos, clocks and calendars as memory aids. Label cabinets and drawers. Try to distract, not confront, the patient. When he/she becomes frustrated or upset, redirect his/her attention to eating or some other activity of interest.  ? If this proves to be due to a permanent condition, talk to the healthcare provider or a  about getting a Power of  for healthcare and for financial decisions. It is best to do this while the person can still sign legal documents and make his or her own decisions. Otherwise, a court order will be required.  Follow-up care  Follow up with the person's healthcare provider or as advised for further testing or changes in medical care.  When to seek medical advice  Call the healthcare provider for any of the following:    Frequent falling    Refusal to eat or drink    Violent behavior or behavior too difficult to manage at home    New hallucinations or delusions    Increased drowsiness    Complaints of headache or numbness or weakness of the face,  arm or leg    Nausea or vomiting    Slurred speech or trouble speaking, walking, or seeing    Fainting spell, dizziness or seizure    Unexplained fever over 100.4  F (38.0  C) or as directed by the healthcare provider  Date Last Reviewed: 8/23/2015 2000-2017 [employer]. [address] [place] [id number]. All rights reserved. This information is not intended as a substitute for professional medical care. Always follow your healthcare professional's instructions.          Hypokalemia  Hypokalemia means a low level of potassium in the blood. This most often occurs in people who take water pills (diuretics). It can also occur because of severe vomiting or diarrhea. You may also have it if you take laxatives for long periods of time. It sometimes happens if you have low magnesium (hypomagnesemia). If you have this, your healthcare provider will treat the low magnesium first.  A mild case of hypokalemia usually causes no symptoms. It is only found with blood testing. More severe potassium loss causes overall weakness, muscle or abdominal cramps, rapid or irregular heartbeats (heart palpitations), low blood pressure, and muscle weakness.   Home care    Take any potassium supplements as prescribed.    Eat foods rich in potassium. The highest amount is found in avocado, baked potatoes, spinach, cantaloupe, cod, halibut, salmon, and scallops. White, red, or penaloza beans are also very good sources. A modest amount of potassium is found in orange juice, bananas, carrots, and tomato juice.    If you take certain types of diuretics, you will also need to take potassium supplements. If you take a diuretic, discuss potassium supplements with your doctor.  Follow-up care  Follow up with your healthcare provider for a repeat blood test within the next week, or as advised by our staff.  When to seek medical advice  Call your healthcare provider right away if any of the following occur:    Increased  weakness, fatigue, or muscle cramps    Dizziness  Call 911  Call 911 if any of the following occur:    Irregular heartbeat, extra beats, or very fast heart rate    Loss of consciousness  Date Last Reviewed: 7/1/2017 2000-2017 [employer]. [address] [place] [address]. All rights reserved. This information is not intended as a substitute for professional medical care. Always follow your healthcare professional's instructions.          Your next 10 appointments already scheduled     Oct 01, 2018 11:30 AM CDT   Return Visit with Jacinta Lerma MD   [facility] ([facility])    [facility]  [address]  [facility] [address] [phone]              24 Hour Appointment Hotline       To make an appointment at any [facility], call [phone] ([phone]). If you don't have a family doctor or clinic, we will help you find one. [facility] clinics are conveniently located to serve the needs of you and your family.             Review of your medicines      START taking        Dose / Directions Last dose taken    ondansetron 4 MG ODT tab   Commonly known as:  ZOFRAN ODT   Dose:  4 mg   Quantity:  10 tablet        Take 1 tablet (4 mg) by mouth every 8 hours as needed for vomiting   Refills:  0          Our records show that you are taking the medicines listed below. If these are incorrect, please call your family doctor or clinic.        Dose / Directions Last dose taken    aspirin 325 MG tablet   Dose:  325 mg   Quantity:  120 tablet        Take 1 tablet (325 mg) by mouth daily   Refills:  3        calcium carbonate 500 mg {elemental} 500 MG tablet   Commonly known as:  OS-LUCIAN   Dose:  1000 mg        Take 1,000 mg by mouth daily   Refills:  0        DAILY MULTI VITAMIN/MINERALS PO        1 TABLET DAILY   Refills:  0                Prescriptions were sent or printed at these locations (1 Prescription)                   [facility] [place], [place] - [address]   [address] [facility] [address]    Telephone:  [phone]   Fax:  [phone]   Hours:                  Printed at Department/Unit printer (1 of 1)         ondansetron (ZOFRAN ODT) 4 MG ODT tab                Procedures and tests performed during your visit     Basic metabolic panel    CBC with platelets differential    CT Head Neck Angio w/o & w Contrast    CT Head w/o Contrast    EKG 12-lead, tracing only    Glucose by meter    INR    MR Brain w/o & w Contrast    Partial thromboplastin time    Troponin I      Orders Needing Specimen Collection     None      Pending Results     Date and Time Order Name Status Description    9/21/2018 1207 Ca27.29  breast tumor marker In process             Pending Culture Results     No orders found from 9/19/2018 to 9/22/2018.            Pending Results Instructions     If you had any lab results that were not finalized at the time of your Discharge, you can call the ED Lab Result RN at [phone]. You will be contacted by this team for any positive Lab results or changes in treatment. The nurses are available 7 days a week from 10A to 6:30P.  You can leave a message 24 hours per day and they will return your call.        Test Results From Your Hospital Stay        9/21/2018  1:52 PM      Component Results     Component Value Ref Range & Units Status    WBC 8.3 4.0 - 11.0 10e9/L Final    RBC Count 4.71 3.8 - 5.2 10e12/L Final    Hemoglobin 13.9 11.7 - 15.7 g/dL Final    Hematocrit 41.5 35.0 - 47.0 % Final    MCV 88 78 - 100 fl Final    MCH 29.5 26.5 - 33.0 pg Final    MCHC 33.5 31.5 - 36.5 g/dL Final    RDW 12.6 10.0 - 15.0 % Final    Platelet Count 249 150 - 450 10e9/L Final    Diff Method Automated Method  Final    % Neutrophils 53.0 % Final    % Lymphocytes 34.0 % Final    % Monocytes 8.2 % Final    % Eosinophils 3.3 % Final    % Basophils 1.1 % Final    % Immature Granulocytes 0.4 % Final    Nucleated RBCs 0 0  /100 Final    Absolute Neutrophil 4.4 1.6 - 8.3 10e9/L Final    Absolute Lymphocytes 2.8 0.8 - 5.3 10e9/L Final    Absolute Monocytes 0.7 0.0 - 1.3 10e9/L Final    Absolute Eosinophils 0.3 0.0 - 0.7 10e9/L Final    Absolute Basophils 0.1 0.0 - 0.2 10e9/L Final    Abs Immature Granulocytes 0.0 0 - 0.4 10e9/L Final    Absolute Nucleated RBC 0.0  Final         9/21/2018  2:20 PM      Component Results     Component Value Ref Range & Units Status    Sodium 140 133 - 144 mmol/L Final    Potassium 2.8 (L) 3.4 - 5.3 mmol/L Final    Chloride 106 94 - 109 mmol/L Final    Carbon Dioxide 25 20 - 32 mmol/L Final    Anion Gap 9 3 - 14 mmol/L Final    Glucose 121 (H) 70 - 99 mg/dL Final    Urea Nitrogen 7 7 - 30 mg/dL Final    Creatinine 0.83 0.52 - 1.04 mg/dL Final    GFR Estimate 68 >60 mL/min/1.7m2 Final    Non  GFR Calc    GFR Estimate If Black 82 >60 mL/min/1.7m2 Final    African American GFR Calc    Calcium 8.7 8.5 - 10.1 mg/dL Final         9/21/2018  2:03 PM      Component Results     Component Value Ref Range & Units Status    INR 0.96 0.86 - 1.14 Final         9/21/2018  2:03 PM      Component Results     Component Value Ref Range & Units Status    PTT 28 22 - 37 sec Final         9/21/2018  2:20 PM      Component Results     Component Value Ref Range & Units Status    Troponin I ES <0.015 0.000 - 0.045 ug/L Final    The 99th percentile for upper reference range is 0.045 ug/L.  Troponin values   in the range of 0.045 - 0.120 ug/L may be associated with risks of adverse   clinical events.           9/21/2018  2:11 PM      Narrative     CT SCAN OF THE HEAD WITHOUT CONTRAST   9/21/2018 1:48 PM     HISTORY: confusion;     TECHNIQUE:  Axial images of the head and coronal reformations without  IV contrast material. Radiation dose for this scan was reduced using  automated exposure control, adjustment of the mA and/or kV according  to patient size, or iterative reconstruction technique.    COMPARISON: MR scan  dated 3/29/2017    FINDINGS:  There is a small area of low-density in the right putamen.  This measures about 1 cm in size. This is new when compared to the MR  scan of 329 and it is consistent with a lacunar type infarct. It is  quite low in density and it could be subacute or chronic. There is no  evidence of intracranial hemorrhage, mass, acute infarct or anomaly.  The visualized portions of the sinuses and mastoids appear normal.  There is no evidence of trauma. There is a subcutaneous nodule in the  anterior frontal region measuring about 9 mm in diameter. This would  be consistent with a sebaceous cyst.        Impression     IMPRESSION:   1. 1 cm area of low-density in the right nasal ganglia region. This is  new since 3/29/2017. It is consistent with a lacunar type infarct. The  exact age of this is uncertain but it is probably subacute or chronic.  2. No bleed or mass effect.      PETERSON SHEEHAN MD         9/21/2018  2:25 PM      Narrative     CTA HEAD NECK WITH CONTRAST  9/21/2018 1:59 PM     HISTORY: Evaluate for dissection/thromboembolism;     TECHNIQUE:  Precontrast localizing scans were followed by CT  angiography with an injection of 70 mL Isovue 370 IV contrast with  scans through the head and neck.  Images were transferred to a  separate 3-D workstation where multiplanar reformations and 3-D images  were created.  Estimates of carotid stenoses are made relative to the  distal internal carotid artery diameters except as noted. Radiation  dose for this scan was reduced using automated exposure control,  adjustment of the mA and/or kV according to patient size, or iterative  reconstruction technique.    COMPARISON: None.    FINDINGS: Estimates of stenosis at the carotid bifurcations are  relative to the distal internal carotids.    Arch: The left vertebral artery arises directly from the arch of the  aorta. This is a normal variant.    Neck:  Right Carotid:  The right common carotid artery is normal.  The  right  carotid bifurcation appears normal.  The right internal carotid artery  is negative.     Left Carotid:  The left common carotid artery is unremarkable.   The  left carotid bifurcation appears normal.  The left internal carotid is  negative.      Vertebrals:  The vertebral arteries are normal.    Head:  Right Carotid:No occluded vessels are seen. .    Left Carotid:  No occluded vessels are identified. .    Basilar:  The basilar artery and its branches appear normal.     Miscellaneous: The venous sinuses are patent..        Impression     IMPRESSION:   1. No stenosis is seen at either carotid bifurcation.  2. No arterial dissection is identified.  3. No high-grade intracranial vascular stenosis is identified.  4. Venous sinuses appear patent    PETERSON SHEEHAN MD         9/21/2018  4:29 PM      Narrative     MRI BRAIN WITHOUT AND WITH CONTRAST  9/21/2018 4:13 PM    HISTORY:  Confusion.  Possible lacunar infarct.     TECHNIQUE:  Multiplanar, multisequence MRI of the brain without and  with 7 mL Gadavist.    COMPARISON: 3/29/2017.    FINDINGS: There is an old lacunar infarct in the right corona radiata  just above the basal ganglia region. There is some T2 shine through in  this region on the diffusion images. There is no evidence for any  definite acute ischemic infarcts. Few scattered nonspecific white  matter changes are also noted. Ventricles and subarachnoid spaces are  normal. Postcontrast images do not show any abnormal areas of  enhancement or any focal mass lesions.        Impression     IMPRESSION:  1. 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  2. Few scattered nonspecific white matter changes.  3. No evidence for intracranial hemorrhage, acute infarct, or any  focal mass lesions.    NACHO GUADALUPE MD         9/21/2018  2:49 PM      Component Results     Component Value Ref Range & Units Status    Glucose 109 (H) 70 - 99 mg/dL Final                Thank you for choosing [facility]       Thank you for  "choosing [facility] for your care. Our goal is always to provide you with excellent care. Hearing back from our patients is one way we can continue to improve our services. Please take a few minutes to complete the written survey that you may receive in the mail after you visit with us. Thank you!        [email or web address] Information     [employer] lets you send messages to your doctor, view your test results, renew your prescriptions, schedule appointments and more. To sign up, go to [email or web address] . Click on \"Log in\" on the left side of the screen, which will take you to the Welcome page. Then click on \"Sign up Now\" on the right side of the page.     You will be asked to enter the access code listed below, as well as some personal information. Please follow the directions to create your username and password.     Your access code is: [email or web address]  Expires: 2018  5:14 PM     Your access code will  in 90 days. If you need help or a new code, please call your [facility] clinic or [phone].        Care EveryWhere ID     This is your Care EveryWhere ID. This could be used by other organizations to access your [facility] medical records  [id number]        Equal Access to Services     MAGGIE JORDAN : Cynthia Kilgore, jennyfer melgar, javier olivera, zeenat hargrove. [facility] [phone].    ATENCIÓN: Si habla español, tiene a rolon disposición servicios gratuitos de asistencia lingüística. Llame al [phone].    We comply with applicable federal civil rights laws and [place] laws. We do not discriminate on the basis of race, color, national origin, age, disability, sex, sexual orientation, or gender identity.            After Visit Summary       This is your record. Keep this with you and show to your community pharmacist(s) and doctor(s) at your next visit.                  "

## 2018-09-21 NOTE — DISCHARGE INSTRUCTIONS
Follow up with primary doctor as needed.   Return if worsening symptoms.   Follow up in about 1 week for recheck of potassium.           Confusion  Confusion is a change in a person s ability to think clearly. There may be trouble recognizing familiar people and places or knowing what day it is. Memory, judgment, and decision-making may also be affected. In severe cases, the person may have limited or no response to being spoken to.  Confusion is usually a sign of an underlying problem. It may occur suddenly. Or it may develop gradually over time. Causes of confusion include brain injury, medicines, alcohol, withdrawal from certain medicines or illegal drugs, and infection. Heart attack and stroke may cause it. Confusion can also be a sign of dementia or a mental illness.  Treatment will depend on the cause of the problem. If the issue is a medicine, stopping the medicine may help. The healthcare provider will perform an evaluation and certain tests may be done. Follow up with the healthcare provider for the results.  Home care    Be sure someone is with the confused person at all times. He or she should not be left alone or unsupervised.    Tell the healthcare provider about all medicines that the person takes. These include prescription, over-the-counter, herbs, and supplements.    Dehydration can increase confusion. Ask the healthcare provider how much fluid the person should be drinking. Offer liquids and ensure that they are taken.    Keep all medicines in a secure place under the caregiver s control. To prevent overdose, a confused person should take medicines only under the supervision of a caregiver.    To help a person with confusion:  ? Establish a daily routine. Change can be a source of stress for someone with confusion. Make and keep a time schedule for common tasks such as bathing, dressing, taking medicines, meals, going for walks, shopping, naps and bed time.  ? Speak slowly and clearly with a gentle  tone of voice. Use short simple words and sentences. Ask one question at a time. Do not interrupt, criticize or argue. Be calm and supportive. Use friendly facial expressions. Use pointing and touching to help communicate. If there has been loss of long-term memory, do not ask questions about past events. This would only cause frustration for the person.  ? Use lists, signs, family photos, clocks and calendars as memory aids. Label cabinets and drawers. Try to distract, not confront, the patient. When he/she becomes frustrated or upset, redirect his/her attention to eating or some other activity of interest.  ? If this proves to be due to a permanent condition, talk to the healthcare provider or a  about getting a Power of  for healthcare and for financial decisions. It is best to do this while the person can still sign legal documents and make his or her own decisions. Otherwise, a court order will be required.  Follow-up care  Follow up with the person's healthcare provider or as advised for further testing or changes in medical care.  When to seek medical advice  Call the healthcare provider for any of the following:    Frequent falling    Refusal to eat or drink    Violent behavior or behavior too difficult to manage at home    New hallucinations or delusions    Increased drowsiness    Complaints of headache or numbness or weakness of the face, arm or leg    Nausea or vomiting    Slurred speech or trouble speaking, walking, or seeing    Fainting spell, dizziness or seizure    Unexplained fever over 100.4  F (38.0  C) or as directed by the healthcare provider  Date Last Reviewed: 8/23/2015 2000-2017 [employer]. [address] [place] [id number]. All rights reserved. This information is not intended as a substitute for professional medical care. Always follow your healthcare professional's instructions.          Hypokalemia  Hypokalemia means a low level of potassium in the  blood. This most often occurs in people who take water pills (diuretics). It can also occur because of severe vomiting or diarrhea. You may also have it if you take laxatives for long periods of time. It sometimes happens if you have low magnesium (hypomagnesemia). If you have this, your healthcare provider will treat the low magnesium first.  A mild case of hypokalemia usually causes no symptoms. It is only found with blood testing. More severe potassium loss causes overall weakness, muscle or abdominal cramps, rapid or irregular heartbeats (heart palpitations), low blood pressure, and muscle weakness.   Home care    Take any potassium supplements as prescribed.    Eat foods rich in potassium. The highest amount is found in avocado, baked potatoes, spinach, cantaloupe, cod, halibut, salmon, and scallops. White, red, or penaloza beans are also very good sources. A modest amount of potassium is found in orange juice, bananas, carrots, and tomato juice.    If you take certain types of diuretics, you will also need to take potassium supplements. If you take a diuretic, discuss potassium supplements with your doctor.  Follow-up care  Follow up with your healthcare provider for a repeat blood test within the next week, or as advised by our staff.  When to seek medical advice  Call your healthcare provider right away if any of the following occur:    Increased weakness, fatigue, or muscle cramps    Dizziness  Call 911  Call 911 if any of the following occur:    Irregular heartbeat, extra beats, or very fast heart rate    Loss of consciousness  Date Last Reviewed: 7/1/2017 2000-2017 [employer]. [address] [place] [id number]. All rights reserved. This information is not intended as a substitute for professional medical care. Always follow your healthcare professional's instructions.

## 2018-09-21 NOTE — ED NOTES
Rapid assessment called in infusion clinic. Pt had syncopal episode while having blood draw. On arrival. Pt alet, confused ,staff assisting pt up into w/c. With assist of 2 laid pt on bed. Initially pt acting appropriate, states she has passed out in past with blood draws. Pt started to slur speech, became more confused. Blood glucose 109. Transported to ED. Alert talking, confused, only knew name, did not know birthday, where she was. Not cooperative. Wanted only to go home. Refused to allow any testing,  several attempts to have her cooperate, unsuccessful. CODE 21 called, Ketamine given.

## 2018-09-21 NOTE — ED NOTES
Pt had syncopal episode in lab and was confused afterwards-blood glucose 109-brought to rm 1 as stroke eval-pt refusing exam-but still confused-refusing labs and ct-states that she is leaving-code 21 to be called

## 2018-09-21 NOTE — ED PROVIDER NOTES
History     Chief Complaint   Patient presents with     Cerebrovascular Accident     stroke eval     HPI  Milly Loaiza is a 68 year old female who has past medical history significant for previous episode of TIA, with daily 325 mg aspirin use and multivitamin use, presenting to the emergency department after patient had rapid response team that was called in the clinic setting.  Initial history is difficult to obtain from patient as she arrives confused, with slight agitation, requesting to leave.  However, after further questioning, patient is not capable of making competent decisions.  She does not have the capacity to provide adequate decisions for her medical care as she is alert and oriented to person.  This is an acute change from patient's previous status based on information that was obtained from nurses, who obtained information from the clinic upstairs.    Rapid response team had been called after patient had a blood draw.  This was in the breast/oncology clinic as patient had  mammogram, and blood draw performed.  Patient began feeling lightheaded, dizzy, and had syncopal episode shortly after blood draw.  She was coming to, however after further discussion with nurses, patient had the notable confusion.  Patient denies any pain.  Uncertain if she hit her head.  No further information was available at time of patient presentation.    Problem List:    Patient Active Problem List    Diagnosis Date Noted     Advanced directives, counseling/discussion 04/05/2017     Priority: Medium     Advance Care Planning 4/5/2017: Information declined             TIA (transient ischemic attack) 03/29/2017     Priority: Medium     CARDIOVASCULAR SCREENING; LDL GOAL LESS THAN 160 10/31/2010     Priority: Medium     DCIS (ductal carcinoma in situ) 03/27/2009     Priority: Medium     Malignant neoplasm of female breast (H) 05/27/2005     Priority: Medium     Problem list name updated by automated process. Provider to  review          Past Medical History:    Past Medical History:   Diagnosis Date     Malignant neoplasm of breast (female), unspecified site        Past Surgical History:    Past Surgical History:   Procedure Laterality Date     SURGICAL HISTORY OF -   2003    Cervical biopsy     SURGICAL HISTORY OF -   1979    Tubal ligation     SURGICAL HISTORY OF -   2/2/2004    Right mastectomy     SURGICAL HISTORY OF -   3-23-09    Needle-guided left breast biopsy.       Family History:    Family History   Problem Relation Age of Onset     Alcohol/Drug Mother      Respiratory Mother      emphazyma     Cerebrovascular Disease Maternal Grandmother      Osteoporosis Maternal Grandmother      Breast Cancer Paternal Grandmother      Depression Sister      Diabetes Other      Cerebrovascular Disease Other      Cancer Other      lymphademia     Respiratory Other      emphazyma     Thyroid Disease Sister        Social History:  Marital Status:   [2]  Social History   Substance Use Topics     Smoking status: Never Smoker     Smokeless tobacco: Not on file     Alcohol use Yes      Comment: occ.        Medications:      ondansetron (ZOFRAN ODT) 4 MG ODT tab   aspirin 325 MG tablet   calcium carbonate (OS-LUCIAN 500 MG [place]. [place]) 500 MG tablet   DAILY MULTI VITAMIN/MINERALS OR         Review of Systems   Unable to perform ROS: Other   unable secondary to confusion    Physical Exam   BP: (!) 214/120  Heart Rate: 102  Temp: 96  F (35.6  C)  Resp: 19  SpO2: 92 %      Physical Exam  BP (!) 176/105  Temp 96  F (35.6  C)  Resp 18  SpO2 98%  General: alert, interactive, in no apparent distress, but confused.  Standing on side of bed, wanting to leave.  Oriented to self.  Not oriented to place, location/president/situation.    Head: atraumatic.  Perhaps slight left facial droop  Nose: no rhinorrhea or epistaxis  Ears: no external auditory canal discharge or bleeding.    Eyes: Sclera nonicteric. Conjunctiva noninjected. PERRL, EOMI  Mouth: no  tonsillar erythema, edema, or exudate  Neck: supple, no palp LAD  Lungs: CTAB  CV: RRR, S1/S2; peripheral pulses palpable and symmetric  Abdomen: soft, nt, nd, no guarding or rebound. Positive bowel sounds  Extremities: no cyanosis or edema  Skin: no rash or diaphoresis  Neuro:  strength 5/5 in UE and LEs bilaterally, sensation intact to light touch in UE and LEs bilaterally;       ED Course     ED Course     Procedures               EKG Interpretation:      Interpreted by Alfonzo Cesar  Time reviewed: 1425  Symptoms at time of EKG: confusion   Rhythm: normal sinus   Rate: normal  Axis: normal  Ectopy: none  Conduction: normal  ST Segments/ T Waves: No ST-T wave changes  Q Waves: none  Comparison to prior: Unchanged    Clinical Impression: normal EKG             The patient has stroke symptoms:           ED Stroke specific documentation           NIHSS PDF          Protocol PDF     Patient last known well time: 1300  ED Provider first to bedside at: arrival  CT Results received at: time of dictation  Patient was not treated with TPA due to the following reason(s):  Mild stroke symptoms ( NIHSS < 4 and not globally aphasic)    National Institutes of Health Stroke Scale (Baseline)  Time Performed: 1335      Score    Level of consciousness: (0)   Alert, keenly responsive    LOC questions: (1)   Answers one question correctly    LOC commands: (1)   Performs one task correctly    Best gaze: (0)   Normal    Visual: (0)   No visual loss    Facial palsy: (0)   Normal symmetrical movements    Motor arm (left): (0)   No drift    Motor arm (right): (0)   No drift    Motor leg (left): (0)   No drift    Motor leg (right): (0)   No drift    Limb ataxia: (0)   Absent    Sensory: (0)   Normal- no sensory loss    Best language: (0)   Normal- no aphasia    Dysarthria: (0)   Normal    Extinction and inattention: (0)   No abnormality        Total Score:  2        Stroke Mimics were considered (including migraine headache, seizure  disorder, hypoglycemia (or hyperglycemia), head or spinal trauma, CNS infection, Toxin ingestion and shock state (e.g. sepsis) .      Evaluation/Treatement was delayed due to: patient agitation/confusion requiring code 21 and sedation                  Results for orders placed or performed during the hospital encounter of 09/21/18 (from the past 24 hour(s))   Glucose by meter   Result Value Ref Range    Glucose 109 (H) 70 - 99 mg/dL   CBC with platelets differential   Result Value Ref Range    WBC 8.3 4.0 - 11.0 10e9/L    RBC Count 4.71 3.8 - 5.2 10e12/L    Hemoglobin 13.9 11.7 - 15.7 g/dL    Hematocrit 41.5 35.0 - 47.0 %    MCV 88 78 - 100 fl    MCH 29.5 26.5 - 33.0 pg    MCHC 33.5 31.5 - 36.5 g/dL    RDW 12.6 10.0 - 15.0 %    Platelet Count 249 150 - 450 10e9/L    Diff Method Automated Method     % Neutrophils 53.0 %    % Lymphocytes 34.0 %    % Monocytes 8.2 %    % Eosinophils 3.3 %    % Basophils 1.1 %    % Immature Granulocytes 0.4 %    Nucleated RBCs 0 0 /100    Absolute Neutrophil 4.4 1.6 - 8.3 10e9/L    Absolute Lymphocytes 2.8 0.8 - 5.3 10e9/L    Absolute Monocytes 0.7 0.0 - 1.3 10e9/L    Absolute Eosinophils 0.3 0.0 - 0.7 10e9/L    Absolute Basophils 0.1 0.0 - 0.2 10e9/L    Abs Immature Granulocytes 0.0 0 - 0.4 10e9/L    Absolute Nucleated RBC 0.0    Basic metabolic panel   Result Value Ref Range    Sodium 140 133 - 144 mmol/L    Potassium 2.8 (L) 3.4 - 5.3 mmol/L    Chloride 106 94 - 109 mmol/L    Carbon Dioxide 25 20 - 32 mmol/L    Anion Gap 9 3 - 14 mmol/L    Glucose 121 (H) 70 - 99 mg/dL    Urea Nitrogen 7 7 - 30 mg/dL    Creatinine 0.83 0.52 - 1.04 mg/dL    GFR Estimate 68 >60 mL/min/1.7m2    GFR Estimate If Black 82 >60 mL/min/1.7m2    Calcium 8.7 8.5 - 10.1 mg/dL   INR   Result Value Ref Range    INR 0.96 0.86 - 1.14   Partial thromboplastin time   Result Value Ref Range    PTT 28 22 - 37 sec   Troponin I   Result Value Ref Range    Troponin I ES <0.015 0.000 - 0.045 ug/L   CT Head w/o Contrast     Narrative    CT SCAN OF THE HEAD WITHOUT CONTRAST   9/21/2018 1:48 PM     HISTORY: confusion;     TECHNIQUE:  Axial images of the head and coronal reformations without  IV contrast material. Radiation dose for this scan was reduced using  automated exposure control, adjustment of the mA and/or kV according  to patient size, or iterative reconstruction technique.    COMPARISON: MR scan dated 3/29/2017    FINDINGS:  There is a small area of low-density in the right putamen.  This measures about 1 cm in size. This is new when compared to the MR  scan of 329 and it is consistent with a lacunar type infarct. It is  quite low in density and it could be subacute or chronic. There is no  evidence of intracranial hemorrhage, mass, acute infarct or anomaly.  The visualized portions of the sinuses and mastoids appear normal.  There is no evidence of trauma. There is a subcutaneous nodule in the  anterior frontal region measuring about 9 mm in diameter. This would  be consistent with a sebaceous cyst.      Impression    IMPRESSION:   1. 1 cm area of low-density in the right nasal ganglia region. This is  new since 3/29/2017. It is consistent with a lacunar type infarct. The  exact age of this is uncertain but it is probably subacute or chronic.  2. No bleed or mass effect.      PETERSON SHEEHAN MD   CT Head Neck Angio w/o & w Contrast    Narrative    CTA HEAD NECK WITH CONTRAST  9/21/2018 1:59 PM     HISTORY: Evaluate for dissection/thromboembolism;     TECHNIQUE:  Precontrast localizing scans were followed by CT  angiography with an injection of 70 mL Isovue 370 IV contrast with  scans through the head and neck.  Images were transferred to a  separate 3-D workstation where multiplanar reformations and 3-D images  were created.  Estimates of carotid stenoses are made relative to the  distal internal carotid artery diameters except as noted. Radiation  dose for this scan was reduced using automated exposure control,  adjustment of the  mA and/or kV according to patient size, or iterative  reconstruction technique.    COMPARISON: None.    FINDINGS: Estimates of stenosis at the carotid bifurcations are  relative to the distal internal carotids.    Arch: The left vertebral artery arises directly from the arch of the  aorta. This is a normal variant.    Neck:  Right Carotid:  The right common carotid artery is normal.  The right  carotid bifurcation appears normal.  The right internal carotid artery  is negative.     Left Carotid:  The left common carotid artery is unremarkable.   The  left carotid bifurcation appears normal.  The left internal carotid is  negative.      Vertebrals:  The vertebral arteries are normal.    Head:  Right Carotid:No occluded vessels are seen. .    Left Carotid:  No occluded vessels are identified. .    Basilar:  The basilar artery and its branches appear normal.     Miscellaneous: The venous sinuses are patent..      Impression    IMPRESSION:   1. No stenosis is seen at either carotid bifurcation.  2. No arterial dissection is identified.  3. No high-grade intracranial vascular stenosis is identified.  4. Venous sinuses appear patent    PETERSON SHEEHAN MD   MR Brain w/o & w Contrast    Narrative    MRI BRAIN WITHOUT AND WITH CONTRAST  9/21/2018 4:13 PM    HISTORY:  Confusion.  Possible lacunar infarct.     TECHNIQUE:  Multiplanar, multisequence MRI of the brain without and  with 7 mL Gadavist.    COMPARISON: 3/29/2017.    FINDINGS: There is an old lacunar infarct in the right corona radiata  just above the basal ganglia region. There is some T2 shine through in  this region on the diffusion images. There is no evidence for any  definite acute ischemic infarcts. Few scattered nonspecific white  matter changes are also noted. Ventricles and subarachnoid spaces are  normal. Postcontrast images do not show any abnormal areas of  enhancement or any focal mass lesions.      Impression    IMPRESSION:  1. Old right corona radiata and  putamen infarct.  2. Few scattered nonspecific white matter changes.  3. No evidence for intracranial hemorrhage, acute infarct, or any  focal mass lesions.    NACHO GUADALUPE MD       Medications   lidocaine 1 % 1 mL (not administered)   lidocaine (LMX4) cream (not administered)   sodium chloride (PF) 0.9% PF flush 3 mL (not administered)   sodium chloride (PF) 0.9% PF flush 3 mL (not administered)   ondansetron (ZOFRAN) injection 4 mg (4 mg Intravenous Given 9/21/18 1645)   ketamine (KETALAR) (HIGH CONC) inj 400 mg (400 mg Intramuscular Given by Other 9/21/18 1329)   0.9% sodium chloride BOLUS (1,000 mLs Intravenous New Bag 9/21/18 1402)   iopamidol (ISOVUE-370) solution 70 mL (70 mLs Intravenous Given 9/21/18 1343)   sodium chloride 0.9 % bag 500mL for CT scan flush use (100 mLs Intravenous Given 9/21/18 1343)   potassium chloride 10 mEq in 100 mL sterile water intermittent infusion (premix) (10 mEq Intravenous New Bag 9/21/18 1448)   gadobutrol (GADAVIST) injection 7 mL (7 mLs Intravenous Given 9/21/18 1612)   sodium chloride (PF) 0.9% PF flush 10 mL (10 mLs Intravenous Given 9/21/18 1612)       Assessments & Plan (with Medical Decision Making)  68 year old female, with past medical history significant for previous TIA, daily 325 mg aspirin use, presenting to the emergency department after patient was in clinic earlier today just having outpatient blood draw performed after she had mammogram.  As her blood was being drawn, patient had reports of syncopal episode.  Initial history was limited secondary to the acute nature of patient's presentation, with history obtained from nurses who responded to the rapid response team in the hospital, with limited history as patient had been alone otherwise in the infusion/blood draw clinic.    Patient arrives confused, oriented to self.  She is unaware of the surroundings.  She is  unaware of the significant amounts of confusion that she has.  She is oriented to person.  She is  not oriented to situation, location, year, president, or  otherwise able to provide competent answers to questioning, and patient without capacity for decision-making regarding her health at this point.    Patient was standing at the side of the bed, wanting to leave.  However, patient did not know where she was going, and was uncertain of her location.  Therefore, given the concern that patient may have had syncopal episode secondary to acute intracranial abnormality, stroke, or other potential life-threatening cause of patient's condition, decision was made to sedate patient with 400 mg intramuscular ketamine.  Patient had refused to allow for blood draw, and refused to have much of an examination performed.  Therefore, code 21 was called by myself, and multiple individuals arrived, and patient was placed onto the bed, and given 400 mg intramuscular ketamine.  After this was allowed to settle in, patient had IV established, bloods drawn, and CT scan of the head with CT angiogram performed.  Old right possible lacunar infarct was noted on CT scan.  I had discussion with radiologist.    At that time, he also had discussion with Dr. Marie, of stroke neurology at the [facility].  Her symptoms do not fit exact distribution, or potential cause of patient's symptoms.  However, we did have discussion that given the more chronic appearing changes of the lacunar infarct, it is possible that patient had contralateral basal ganglia injury/stroke, which may have further exacerbated her level of confusion.  Therefore, stroke neurology recommended MRI to be performed.     later arrived, and we discussed the situation with .  Also, further time was available to review chart, and patient had similar confusion episode in March, 2017, at which time patient had been hospitalized overnight, with resolution of symptoms.    MRI performed that shows old right corona radiata and putamen infarct.  Nonspecific  white matter changes, with no evidence of acute infarct.  Laboratory workup had showed no acute findings with the exception of mild hypokalemia, which was replaced with IV potassium.    After ketamine was allowed to wear away, patient appears to have baseline mental and neurologic status after further discussion with .  At this point, given her negative MRI imaging, with baseline status, patient will be discharged home.  She did have episodes of vomiting prior to discharge.  Patient is otherwise well appearing, with normal neurologic status at time of discharge.  Encourage follow-up with primary care provider, and return if worsening symptoms.    Critical Care Addendum    My initial assessment, based on my review of nursing observations, review of vital signs, focused history and physical exam, established that Milly Loaiza has altered mental status, which requires immediate intervention, and therefore She is critically ill.     After the initial assessment, the care team initiated multiple lab tests, initiated IV fluid administration and initiated medication therapy with ketamine to provide stabilization care. Due to the critical nature of this patient, I reassessed nursing observations, vital signs, physical exam and neurologic status multiple times prior to her disposition.     Time also spent performing documentation, discussion with family to obtain medical information for decision making, reviewing test results, discussion with consultants and coordination of care.     Critical care time (excluding teaching time and procedures): 95 minutes.        I have reviewed the nursing notes.    I have reviewed the findings, diagnosis, plan and need for follow up with the patient.       New Prescriptions    ONDANSETRON (ZOFRAN ODT) 4 MG ODT TAB    Take 1 tablet (4 mg) by mouth every 8 hours as needed for vomiting       Final diagnoses:   Altered mental status, unspecified altered mental status type    Hypokalemia       9/21/2018   [facility] EMERGENCY DEPARTMENT     Alfonzo Cesar MD  09/21/18 0495

## 2018-09-21 NOTE — ED TRIAGE NOTES
Pt had syncopal episode during lab draw and brought to room 1 as stroke eval-pt initially had problems following commands and could not give name and birthdate-now

## 2018-09-21 NOTE — ED AVS SNAPSHOT
[facility] Emergency Department    [address] [address]    Phone:  [phone]    Fax:  [phone]                                       Milly Loaiza   MRN: [id number]    Department:  [facility] Emergency Department   Date of Visit:  9/21/2018           After Visit Summary Signature Page     I have received my discharge instructions, and my questions have been answered. I have discussed any challenges I see with this plan with the nurse or doctor.    ..........................................................................................................................................  Patient/Patient Representative Signature      ..........................................................................................................................................  Patient Representative Print Name and Relationship to Patient    ..................................................               ................................................  Date                                   Time    ..........................................................................................................................................  Reviewed by Signature/Title    ...................................................              ..............................................  Date                                               Time          22EPIC Rev 08/18

## 2018-10-01 ENCOUNTER — ONCOLOGY VISIT (OUTPATIENT)
Dept: ONCOLOGY | Facility: CLINIC | Age: 68
End: 2018-10-01
Attending: INTERNAL MEDICINE
Payer: MEDICARE

## 2018-10-01 VITALS
TEMPERATURE: 98.4 F | RESPIRATION RATE: 16 BRPM | HEART RATE: 65 BPM | SYSTOLIC BLOOD PRESSURE: 160 MMHG | OXYGEN SATURATION: 97 % | BODY MASS INDEX: 27.54 KG/M2 | WEIGHT: 161.3 LBS | DIASTOLIC BLOOD PRESSURE: 77 MMHG | HEIGHT: 64 IN

## 2018-10-01 DIAGNOSIS — Z85.3 PERSONAL HISTORY OF MALIGNANT NEOPLASM OF BREAST: Primary | ICD-10-CM

## 2018-10-01 DIAGNOSIS — Z12.31 BREAST CANCER SCREENING BY MAMMOGRAM: ICD-10-CM

## 2018-10-01 DIAGNOSIS — R97.8 ELEVATED TUMOR MARKERS: ICD-10-CM

## 2018-10-01 PROCEDURE — G0463 HOSPITAL OUTPT CLINIC VISIT: HCPCS

## 2018-10-01 PROCEDURE — 99213 OFFICE O/P EST LOW 20 MIN: CPT | Performed by: INTERNAL MEDICINE

## 2018-10-01 ASSESSMENT — PAIN SCALES - GENERAL: PAINLEVEL: NO PAIN (0)

## 2018-10-01 NOTE — NURSING NOTE
"Oncology Rooming Note    October 1, 2018 11:36 AM   Milly Loaiza is a 68 year old female who presents for:    Chief Complaint   Patient presents with     Oncology Clinic Visit     1 year recheck Personal history of malignant neoplasm of breast, review Labs & Mammogram      Initial Vitals: /77 (BP Location: Right arm, Patient Position: Sitting, Cuff Size: Adult Regular)  Pulse 65  Temp 98.4  F (36.9  C) (Tympanic)  Resp 16  Ht 1.632 m (5' 4.25\")  Wt 73.2 kg (161 lb 4.8 oz)  SpO2 97%  Breastfeeding? No  BMI 27.47 kg/m2 Estimated body mass index is 27.47 kg/(m^2) as calculated from the following:    Height as of this encounter: 1.632 m (5' 4.25\").    Weight as of this encounter: 73.2 kg (161 lb 4.8 oz). Body surface area is 1.82 meters squared.  No Pain (0) Comment: Data Unavailable   No LMP recorded. Patient is postmenopausal.  Allergies reviewed: Yes  Medications reviewed: Yes    Medications: Medication refills not needed today.  Pharmacy name entered into [facility]: [facility] [place], [place] - [address]    Clinical concerns:  1 year recheck Personal history of malignant neoplasm of breast, review Labs & Mammogram     7 minutes for nursing intake (face to face time)     Annamaria Pradhan CMA              "

## 2018-10-01 NOTE — MR AVS SNAPSHOT
After Visit Summary   10/1/2018    Milly Loaiza    MRN: [id number]           Patient Information     Date Of Birth          1950        Visit Information        Provider Department      10/1/2018 11:30 AM Jacinta Lerma MD [facility]        Today's Diagnoses     Personal history of malignant neoplasm of breast    -  1    Breast cancer screening by mammogram          Care Instructions    1 yr f/u with labs and MA          Follow-ups after your visit        Your next 10 appointments already scheduled     Sep 23, 2019  2:45 PM CDT   MA SCREENING BILATERAL W/ JODY with [facility]   [facility] ([facility])    [address] [address]   [phone]           How do I prepare for my exam? (Food and drink instructions) No Food and Drink Restrictions.  How do I prepare for my exam? (Other instructions) Do not use any powder, lotion or deodorant under your arms or on your breast. If you do, we will ask you to remove it before your exam.  What should I wear: Wear comfortable, two-piece clothing.  How long does the exam take: Most scans will take 15 minutes.  What should I bring: Bring any previous mammograms from other facilities or have them mailed to the breast center.  Do I need a :  No  is needed.  What do I need to tell my doctor: If you have any allergies, tell your care team.  What should I do after the exam: No restrictions, You may resume normal activities.  What is this test: This test is an x-ray of the breast to look for breast disease. The breast is pressed between two plates to flatten and spread the tissue. An X-ray is taken of the breast from different angles.  Who should I call with questions: If you have any questions, please call the Imaging Department where you will have your exam. Directions, parking instructions, and other information is available on our website, [email or web address].  Other information about my exam  "Three-dimensional (3D) mammograms are available at [facility] locations in [place], [place], [place], [place], [facility], [place], and [place].  Health locations include [place] and [facility] in [place].  Benefits of 3D mammograms include: * Improved rate of cancer detection * Decreases your chance of having to go back for more tests, which means fewer: * \"False-positive\" results (This means that there is an abnormal area but it isn't cancer.) * Invasive testing procedures, such as a biopsy or surgery * Can provide clearer images of the breast if you have dense breast tissue.  *3D mammography is an optional exam that anyone can have with a 2D mammogram. It doesn't replace or take the place of a 2D mammogram. 2D mammograms remain an effective screening test for all women.  Not all insurance companies cover the cost of a 3D mammogram. Check with your insurance.            Sep 23, 2019  3:10 PM CDT   LAB with [facility] ([facility])    [address] [address]   [phone]           Please do not eat 10-12 hours before your appointment if you are coming in fasting for labs on lipids, cholesterol, or glucose (sugar). This does not apply to pregnant women. Water, hot tea and black coffee (with nothing added) are okay. Do not drink other fluids, diet soda or chew gum.            Sep 30, 2019  1:45 PM CDT   Return Visit with Jacinta Lerma MD   [facility] ([facility])    [facility]  [address] [place] [address]   [phone]              Future tests that were ordered for you today     Open Future Orders        Priority Expected Expires Ordered    Ca27.29  breast tumor marker Routine 9/1/2019 10/1/2019 10/1/2018    Comprehensive metabolic panel Routine 9/1/2019 10/1/2019 10/1/2018    CBC with platelets differential Routine 9/1/2019 10/1/2019 10/1/2018    MA " "Screen Bilateral w/Francisco Routine 2019 10/1/2019 10/1/2018            Who to contact     If you have questions or need follow up information about today's clinic visit or your schedule please contact [facility] directly at [phone].  Normal or non-critical lab and imaging results will be communicated to you by MyChart, letter or phone within 4 business days after the clinic has received the results. If you do not hear from us within 7 days, please contact the clinic through MyChart or phone. If you have a critical or abnormal lab result, we will notify you by phone as soon as possible.  Submit refill requests through [employer] or call your pharmacy and they will forward the refill request to us. Please allow 3 business days for your refill to be completed.          Additional Information About Your Visit        MyChart Information     [employer] lets you send messages to your doctor, view your test results, renew your prescriptions, schedule appointments and more. To sign up, go to [email or web address] . Click on \"Log in\" on the left side of the screen, which will take you to the Welcome page. Then click on \"Sign up Now\" on the right side of the page.     You will be asked to enter the access code listed below, as well as some personal information. Please follow the directions to create your username and password.     Your access code is: [email or web address]  Expires: 2018  5:14 PM     Your access code will  in 90 days. If you need help or a new code, please call your [facility] or [phone].        Care EveryWhere ID     This is your Care EveryWhere ID. This could be used by other organizations to access your [place] medical records  [id number]        Your Vitals Were     Pulse Temperature Respirations Height Pulse Oximetry Breastfeeding?    65 98.4  F (36.9  C) (Tympanic) 16 1.632 m (5' 4.25\") 97% No    BMI (Body Mass Index)                   27.47 kg/m2            Blood Pressure from " Last 3 Encounters:   10/01/18 160/77   09/21/18 (!) 175/101   03/31/18 147/75    Weight from Last 3 Encounters:   10/01/18 73.2 kg (161 lb 4.8 oz)   03/31/18 71.7 kg (158 lb)   10/16/17 72.9 kg (160 lb 12.8 oz)               Primary Care Provider Office Phone # Fax #    Nhung Navarro [phone] [address] [place] [address]        Equal Access to Services     [facility]: Hadii aad ku hadasho Soomaali, waaxda luqadaha, qaybta kaalmada adeegyada, zeenat pickens . So [facility] [phone].    ATENCIÓN: Si habla español, tiene a rolon disposición servicios gratuitos de asistencia lingüística. [facility] [phone].    We comply with applicable federal civil rights laws and [place] laws. We do not discriminate on the basis of race, color, national origin, age, disability, sex, sexual orientation, or gender identity.            Thank you!     Thank you for choosing [facility]  for your care. Our goal is always to provide you with excellent care. Hearing back from our patients is one way we can continue to improve our services. Please take a few minutes to complete the written survey that you may receive in the mail after your visit with us. Thank you!             Your Updated Medication List - Protect others around you: Learn how to safely use, store and throw away your medicines at www.disposemymeds.org.          This list is accurate as of 10/1/18 12:03 PM.  Always use your most recent med list.                   Brand Name Dispense Instructions for use Diagnosis    aspirin 325 MG tablet     120 tablet    Take 1 tablet (325 mg) by mouth daily    Transient cerebral ischemia, unspecified type       calcium carbonate 500 mg {elemental} 500 MG tablet    OS-LUCIAN     Take 1,000 mg by mouth daily        DAILY MULTI VITAMIN/MINERALS PO      1 TABLET DAILY

## 2018-10-01 NOTE — PROGRESS NOTES
"CHIEF COMPLAINT AND REASON FOR VISIT: Breast cancer 2009 on  followup.     HISTORY OF PRESENT ILLNESS: Milly Loaiza was diagnosed first time end of  through screening mammogram right breast cancer, lumpectomy 2004 T2N0M0 poorly differentiated infiltrating ductal cancer, ER negative, HER-2 negative.   She had 3 cycles of AC, could not tolerate anymore oral chemotherapy, followed by radiation, has been on followup since then.   She had a second breast cancer on the right side diagnosed screening mammogram 2008. Biopsy 2009 at [facility] indicating high-grade DCIS with necrosis, not enough tissue for ER/IN studies. Pathology from lumpectomy 2009 no residual invasive cancer. She has been on followup for both.     PAST MEDICAL HISTORY: Nothing other than breast cancer.     MEDICATIONS: No routine medication other than multivitamin.     ALLERGIES: Sulfa.     SOCIAL HISTORY: She lives with her  at home.  She has a lot of animals to take care of.     FAMILY HISTORY: Paternal grandmother was diagnosed with breast cancer and  from that at age 68 back in the 60s.     HABITS: Never smoked. Does not use alcohol.     REVIEW OF SYSTEMS: Milly Loaiza is in her usual state of health, very active, a middle-aged woman, looks like her stated age. 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 She is taking vitamin D.     PHYSICAL EXAMINATION:   VITAL SIGNS: Blood pressure 160/77, pulse 65, temperature 98.4  F (36.9  C), temperature source Tympanic, resp. rate 16, height 1.632 m (5' 4.25\"), weight 73.2 kg (161 lb 4.8 oz), SpO2 97 %, not currently breastfeeding.  GENERAL APPEARANCE: Looks like her stated age, not in acute distress.   HEENT: The patient is normocephalic, atraumatic. Pupils are equal react to light. Sclerae are anicteric. Moist oral mucosa. Negative pharynx. No oral thrush. NECK: Supple. No jugular venous distention. Thyroid is not palpable.   LYMPH NODES: Superficial lymphadenopathy is " not appreciable in the bilateral cervical, supraclavicular, axillary or inguinal adenopathy. CARDIOVASCULAR: S1, S2 regular with no murmurs or gallops. No carotid or abdominal bruits. PULMONARY: Lungs are clear to auscultation and percussion bilaterally. There is no wheezing or rhonchi. GASTROINTESTINAL: Abdomen is soft, nontender. No hepatosplenomegaly. No signs of ascites. No mass appreciable. MUSCULOSKELETAL/EXTREMITIES: No edema. No cyanotic changes. No signs of joint deformity. No lymphedema. NEUROLOGIC: Cranial nerves II-XII are grossly intact. Sensation intact. Muscle strength and muscle tone symmetrical all through 5/5. BACK: No spinal or paraspinal tenderness. No CVA tenderness.   SKIN: No petechiae. No rash. No signs of cellulitis.   BREASTS: She has retracted nipple on the right side. No palpable lesion.     CURRENT LAB DATA REVIEWED  Cbc diff nl, K 2.8 s/p IV K supplement, CMP otherwise is fine  [id number] at 44 in 9/2018, at  48 in 10/2017, at 50 in 10/2016, at 36 in 10/2015, at 46 in 12/2014, 46 in 10/2014  from nl, at 41 in 2011.    CURRENT IMAGE REVIEWED  MA 9/2018 - negative.     OLD DATA REVIEW IN SUMMARY:   Mammo 9/2013 - negative.  Chest x-ray 07/2012: Satisfactory.     Chest x-ray 07/2010 was negative.   Mammogram 09/2011 was negative.     ASSESSMENT AND PLAN:   1. Two-time breast cancer survivor. First time was triple negative breast cancer. The second one was DCIS. She is very worried about recurrence since she has experienced that once already.   We talked extensively about lifestyle modification issues, what things she needs to pay attention to in terms of vitamin D, aspirin intake, exercise and weight control, control alcohol intake, etc. She is very motivated and willing to proceed.   She is yrly follow up.     2. Elevation of her tumor marker is fluctuating with unclear etiology.   We discuss the draw back of this test, and what is the proper way to randolph it and ASCO guideline on not doing it.    She is aware of it, yet still wants to do it.

## 2018-10-01 NOTE — LETTER
"    10/1/2018         RE: Milly Loaiza  [id number] [facility] [address]        Dear Colleague,    Thank you for referring your patient, Milly Loaiza, to the [facility]. Please see a copy of my visit note below.    CHIEF COMPLAINT AND REASON FOR VISIT: Breast cancer ,  on  followup.     HISTORY OF PRESENT ILLNESS: Milly Loaiza was diagnosed first time end of  through screening mammogram right breast cancer, lumpectomy 2004 T2N0M0 poorly differentiated infiltrating ductal cancer, ER negative, HER-2 negative.   She had 3 cycles of AC, could not tolerate anymore oral chemotherapy, followed by radiation, has been on followup since then.   She had a second breast cancer on the right side diagnosed screening mammogram 2008. Biopsy 2009 at [facility] indicating high-grade DCIS with necrosis, not enough tissue for ER/WI studies. Pathology from lumpectomy 2009 no residual invasive cancer. She has been on followup for both.     PAST MEDICAL HISTORY: Nothing other than breast cancer.     MEDICATIONS: No routine medication other than multivitamin.     ALLERGIES: Sulfa.     SOCIAL HISTORY: She lives with her  at home.  She has a lot of animals to take care of.     FAMILY HISTORY: Paternal grandmother was diagnosed with breast cancer and  from that at age 68 back in the 60s.     HABITS: Never smoked. Does not use alcohol.     REVIEW OF SYSTEMS: Milly Loaiza is in her usual state of health, very active, a middle-aged woman, looks like her stated age. 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 She is taking vitamin D.     PHYSICAL EXAMINATION:   VITAL SIGNS: Blood pressure 160/77, pulse 65, temperature 98.4  F (36.9  C), temperature source Tympanic, resp. rate 16, height 1.632 m (5' 4.25\"), weight 73.2 kg (161 lb 4.8 oz), SpO2 97 %, not currently breastfeeding.  GENERAL APPEARANCE: Looks like her stated age, not in acute distress.   HEENT: The patient is " normocephalic, atraumatic. Pupils are equal react to light. Sclerae are anicteric. Moist oral mucosa. Negative pharynx. No oral thrush. NECK: Supple. No jugular venous distention. Thyroid is not palpable.   LYMPH NODES: Superficial lymphadenopathy is not appreciable in the bilateral cervical, supraclavicular, axillary or inguinal adenopathy. CARDIOVASCULAR: S1, S2 regular with no murmurs or gallops. No carotid or abdominal bruits. PULMONARY: Lungs are clear to auscultation and percussion bilaterally. There is no wheezing or rhonchi. GASTROINTESTINAL: Abdomen is soft, nontender. No hepatosplenomegaly. No signs of ascites. No mass appreciable. MUSCULOSKELETAL/EXTREMITIES: No edema. No cyanotic changes. No signs of joint deformity. No lymphedema. NEUROLOGIC: Cranial nerves II-XII are grossly intact. Sensation intact. Muscle strength and muscle tone symmetrical all through 5/5. BACK: No spinal or paraspinal tenderness. No CVA tenderness.   SKIN: No petechiae. No rash. No signs of cellulitis.   BREASTS: She has retracted nipple on the right side. No palpable lesion.     CURRENT LAB DATA REVIEWED  Cbc diff nl, K 2.8 s/p IV K supplement, CMP otherwise is fine  [id number] at 44 in 9/2018, at  48 in 10/2017, at 50 in 10/2016, at 36 in 10/2015, at 46 in 12/2014, 46 in 10/2014  from nl, at 41 in 2011.    CURRENT IMAGE REVIEWED  MA 9/2018 - negative.     OLD DATA REVIEW IN SUMMARY:   Mammo 9/2013 - negative.  Chest x-ray 07/2012: Satisfactory.     Chest x-ray 07/2010 was negative.   Mammogram 09/2011 was negative.     ASSESSMENT AND PLAN:   1. Two-time breast cancer survivor. First time was triple negative breast cancer. The second one was DCIS. She is very worried about recurrence since she has experienced that once already.   We talked extensively about lifestyle modification issues, what things she needs to pay attention to in terms of vitamin D, aspirin intake, exercise and weight control, control alcohol intake, etc. She is  very motivated and willing to proceed.   She is yrly follow up.     2. Elevation of her tumor marker is fluctuating with unclear etiology.   We discuss the draw back of this test, and what is the proper way to randolph it and ASCO guideline on not doing it.   She is aware of it, yet still wants to do it.         Again, thank you for allowing me to participate in the care of your patient.        Sincerely,        Jacinta Lerma MD, MD

## 2018-10-01 NOTE — PATIENT INSTRUCTIONS
We would like to see you back in 1 year for a follow up appointment with labs and Mammogram prior.     When you are in need of a refill, please call your pharmacy and they will send us a request.      Copy of appointments, and after visit summary (AVS) given to patient.      If you have any questions please call Kindra Reagan RN, BSN Oncology Hematology  [facility] [phone]. For questions after business hours, or on holidays/weekends, please call our after hours Nurse Triage line [phone]. Thank you.         1 yr f/u with labs and MA

## 2019-03-27 ENCOUNTER — OFFICE VISIT (OUTPATIENT)
Dept: FAMILY MEDICINE | Facility: CLINIC | Age: 69
End: 2019-03-27
Payer: COMMERCIAL

## 2019-03-27 VITALS
RESPIRATION RATE: 18 BRPM | TEMPERATURE: 97.6 F | HEART RATE: 88 BPM | DIASTOLIC BLOOD PRESSURE: 88 MMHG | BODY MASS INDEX: 27.91 KG/M2 | SYSTOLIC BLOOD PRESSURE: 144 MMHG | HEIGHT: 64 IN | WEIGHT: 163.5 LBS

## 2019-03-27 DIAGNOSIS — S39.012A STRAIN OF LUMBAR REGION, INITIAL ENCOUNTER: Primary | ICD-10-CM

## 2019-03-27 DIAGNOSIS — C50.911 MALIGNANT NEOPLASM OF RIGHT FEMALE BREAST, UNSPECIFIED ESTROGEN RECEPTOR STATUS, UNSPECIFIED SITE OF BREAST (H): ICD-10-CM

## 2019-03-27 DIAGNOSIS — J34.89 SINUS PRESSURE: ICD-10-CM

## 2019-03-27 DIAGNOSIS — F43.9 SITUATIONAL STRESS: ICD-10-CM

## 2019-03-27 DIAGNOSIS — I63.81 LACUNAR INFARCTION (H): ICD-10-CM

## 2019-03-27 PROCEDURE — 99214 OFFICE O/P EST MOD 30 MIN: CPT | Performed by: FAMILY MEDICINE

## 2019-03-27 ASSESSMENT — PAIN SCALES - GENERAL: PAINLEVEL: MILD PAIN (2)

## 2019-03-27 ASSESSMENT — MIFFLIN-ST. JEOR: SCORE: 1250.6
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
## 2019-03-27 NOTE — PATIENT INSTRUCTIONS
*   I think this is frontal sinus infection. Let me know if you'd like to try some antibiotics.     *   Sounds like muscle pain.     *   Sounds like stress.     *   I would recommend physical therapy. Stop by here or call [phone].     *   Consider seeing a counselor. Call [phone].     *   Come back in one month to discuss the old stroke.

## 2019-03-31 PROBLEM — I63.81 LACUNAR INFARCTION (H): Status: ACTIVE | Noted: 2019-03-31

## 2019-09-23 ENCOUNTER — HOSPITAL ENCOUNTER (OUTPATIENT)
Dept: LAB | Facility: CLINIC | Age: 69
End: 2019-09-23
Attending: INTERNAL MEDICINE
Payer: COMMERCIAL

## 2019-09-23 ENCOUNTER — HOSPITAL ENCOUNTER (OUTPATIENT)
Dept: MAMMOGRAPHY | Facility: CLINIC | Age: 69
Discharge: HOME | End: 2019-09-23
Attending: INTERNAL MEDICINE | Admitting: INTERNAL MEDICINE
Payer: COMMERCIAL

## 2019-09-23 DIAGNOSIS — Z85.3 PERSONAL HISTORY OF MALIGNANT NEOPLASM OF BREAST: ICD-10-CM

## 2019-09-23 DIAGNOSIS — Z12.31 BREAST CANCER SCREENING BY MAMMOGRAM: ICD-10-CM

## 2019-09-23 LAB
ALBUMIN SERPL-MCNC: 3.9 G/DL (ref 3.4–5)
ALP SERPL-CCNC: 120 U/L (ref 40–150)
ALT SERPL W P-5'-P-CCNC: 22 U/L (ref 0–50)
ANION GAP SERPL CALCULATED.3IONS-SCNC: 8 MMOL/L (ref 3–14)
AST SERPL W P-5'-P-CCNC: 24 U/L (ref 0–45)
BASOPHILS # BLD AUTO: 0.1 10E9/L (ref 0–0.2)
BASOPHILS NFR BLD AUTO: 1 %
BILIRUB SERPL-MCNC: 0.3 MG/DL (ref 0.2–1.3)
BUN SERPL-MCNC: 10 MG/DL (ref 7–30)
CALCIUM SERPL-MCNC: 8.7 MG/DL (ref 8.5–10.1)
CANCER AG27-29 SERPL-ACNC: 37 U/ML (ref 0–39)
CHLORIDE SERPL-SCNC: 107 MMOL/L (ref 94–109)
CO2 SERPL-SCNC: 27 MMOL/L (ref 20–32)
CREAT SERPL-MCNC: 0.79 MG/DL (ref 0.52–1.04)
DIFFERENTIAL METHOD BLD: NORMAL
EOSINOPHIL # BLD AUTO: 0.2 10E9/L (ref 0–0.7)
EOSINOPHIL NFR BLD AUTO: 2.8 %
ERYTHROCYTE [DISTWIDTH] IN BLOOD BY AUTOMATED COUNT: 12.6 % (ref 10–15)
GFR SERPL CREATININE-BSD FRML MDRD: 75 ML/MIN/{1.73_M2}
GLUCOSE SERPL-MCNC: 104 MG/DL (ref 70–99)
HCT VFR BLD AUTO: 41.1 % (ref 35–47)
HGB BLD-MCNC: 13.8 G/DL (ref 11.7–15.7)
IMM GRANULOCYTES # BLD: 0 10E9/L (ref 0–0.4)
IMM GRANULOCYTES NFR BLD: 0.3 %
LYMPHOCYTES # BLD AUTO: 1.6 10E9/L (ref 0.8–5.3)
LYMPHOCYTES NFR BLD AUTO: 21.8 %
MCH RBC QN AUTO: 29.2 PG (ref 26.5–33)
MCHC RBC AUTO-ENTMCNC: 33.6 G/DL (ref 31.5–36.5)
MCV RBC AUTO: 87 FL (ref 78–100)
MONOCYTES # BLD AUTO: 0.7 10E9/L (ref 0–1.3)
MONOCYTES NFR BLD AUTO: 9.8 %
NEUTROPHILS # BLD AUTO: 4.6 10E9/L (ref 1.6–8.3)
NEUTROPHILS NFR BLD AUTO: 64.3 %
NRBC # BLD AUTO: 0 10*3/UL
NRBC BLD AUTO-RTO: 0 /100
PLATELET # BLD AUTO: 254 10E9/L (ref 150–450)
POTASSIUM SERPL-SCNC: 3.3 MMOL/L (ref 3.4–5.3)
PROT SERPL-MCNC: 7.8 G/DL (ref 6.8–8.8)
RBC # BLD AUTO: 4.73 10E12/L (ref 3.8–5.2)
SODIUM SERPL-SCNC: 142 MMOL/L (ref 133–144)
WBC # BLD AUTO: 7.2 10E9/L (ref 4–11)

## 2019-09-23 PROCEDURE — 85025 COMPLETE CBC W/AUTO DIFF WBC: CPT | Performed by: INTERNAL MEDICINE

## 2019-09-23 PROCEDURE — 86300 IMMUNOASSAY TUMOR CA 15-3: CPT | Performed by: INTERNAL MEDICINE

## 2019-09-23 PROCEDURE — 80053 COMPREHEN METABOLIC PANEL: CPT | Performed by: INTERNAL MEDICINE

## 2019-09-23 PROCEDURE — 36415 COLL VENOUS BLD VENIPUNCTURE: CPT | Performed by: INTERNAL MEDICINE

## 2019-09-23 PROCEDURE — 77063 BREAST TOMOSYNTHESIS BI: CPT

## 2019-09-30 ENCOUNTER — ONCOLOGY VISIT (OUTPATIENT)
Dept: ONCOLOGY | Facility: CLINIC | Age: 69
End: 2019-09-30
Attending: INTERNAL MEDICINE
Payer: COMMERCIAL

## 2019-09-30 VITALS
HEART RATE: 75 BPM | BODY MASS INDEX: 27.76 KG/M2 | OXYGEN SATURATION: 95 % | SYSTOLIC BLOOD PRESSURE: 168 MMHG | WEIGHT: 162.6 LBS | RESPIRATION RATE: 20 BRPM | HEIGHT: 64 IN | TEMPERATURE: 98.9 F | DIASTOLIC BLOOD PRESSURE: 85 MMHG

## 2019-09-30 DIAGNOSIS — R92.8 ABNORMAL MAMMOGRAM: ICD-10-CM

## 2019-09-30 DIAGNOSIS — Z85.3 PERSONAL HISTORY OF MALIGNANT NEOPLASM OF BREAST: Primary | ICD-10-CM

## 2019-09-30 DIAGNOSIS — E87.6 HYPOKALEMIA: ICD-10-CM

## 2019-09-30 PROCEDURE — 99214 OFFICE O/P EST MOD 30 MIN: CPT | Performed by: INTERNAL MEDICINE

## 2019-09-30 PROCEDURE — G0463 HOSPITAL OUTPT CLINIC VISIT: HCPCS

## 2019-09-30 RX ORDER — ACETAMINOPHEN 325 MG/1
325 TABLET ORAL EVERY 6 HOURS PRN
COMMUNITY

## 2019-09-30 ASSESSMENT — MIFFLIN-ST. JEOR: SCORE: 1237.55

## 2019-09-30 ASSESSMENT — PAIN SCALES - GENERAL: PAINLEVEL: NO PAIN (0)

## 2019-09-30 NOTE — PROGRESS NOTES
"CHIEF COMPLAINT AND REASON FOR VISIT:   Breast cancer ,  on  followup.     HISTORY OF ONCOLOGY ILLNESS: Milly Loaiza was diagnosed first time end of  through screening mammogram right breast cancer, lumpectomy 2004 T2N0M0 poorly differentiated infiltrating ductal cancer, ER negative, HER-2 negative.   She had 3 cycles of AC, could not tolerate anymore oral chemotherapy, followed by radiation, has been on followup since then.   She had a second breast cancer on the right side diagnosed screening mammogram 2008. Biopsy 2009 at [facility] indicating high-grade DCIS with necrosis, not enough tissue for ER/AR studies. Pathology from lumpectomy 2009 no residual invasive cancer. She has been on followup for both.     INTERVAL HISTORY:  She has abnormal screening mammogram 2019 which found 0.9 cm focal asymmetry 2-3 o'clock in the right breast 2.7 cm from the nipple.  Since the patient's last visit with us, there is no hospital stay/surgery/new diagnosis made.    PAST MEDICAL HISTORY: Nothing other than breast cancer.     MEDICATIONS: No routine medication other than multivitamin.     ALLERGIES: Sulfa.     SOCIAL HISTORY: She lives with her  at home.  She has a lot of animals to take care of.     FAMILY HISTORY: Paternal grandmother was diagnosed with breast cancer and  from that at age 68 back in the 60s.     HABITS: Never smoked. Does not use alcohol.     REVIEW OF SYSTEMS:   Milly Loaiza is in her usual state of health, very active, a middle-aged woman, looks like her stated age. 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 She is taking vitamin D.     PHYSICAL EXAMINATION:   VITAL SIGNS: Blood pressure (!) 168/85, pulse 75, temperature 98.9  F (37.2  C), temperature source Tympanic, resp. rate 20, height 1.626 m (5' 4\"), weight 73.8 kg (162 lb 9.6 oz), SpO2 95 %, not currently breastfeeding.  GENERAL APPEARANCE: Looks like her stated age, not in acute distress.   HEENT: " The patient is normocephalic, atraumatic. Pupils are equal react to light. Sclerae are anicteric. Moist oral mucosa. Negative pharynx. No oral thrush. NECK: Supple. No jugular venous distention. Thyroid is not palpable.   LYMPH NODES: Superficial lymphadenopathy is not appreciable in the bilateral cervical, supraclavicular, axillary or inguinal adenopathy. CARDIOVASCULAR: S1, S2 regular with no murmurs or gallops. No carotid or abdominal bruits. PULMONARY: Lungs are clear to auscultation and percussion bilaterally. There is no wheezing or rhonchi. GASTROINTESTINAL: Abdomen is soft, nontender. No hepatosplenomegaly. No signs of ascites. No mass appreciable. MUSCULOSKELETAL/EXTREMITIES: No edema. No cyanotic changes. No signs of joint deformity. No lymphedema. NEUROLOGIC: Cranial nerves II-XII are grossly intact. Sensation intact. Muscle strength and muscle tone symmetrical all through 5/5. BACK: No spinal or paraspinal tenderness. No CVA tenderness.   SKIN: No petechiae. No rash. No signs of cellulitis.   BREASTS: She has retracted nipple on the right side. No palpable lesion.  Left breast exam is negative.    CURRENT LAB DATA REVIEWED  CBC diff is fine, K 3.3, CMP is fine,   Marker is fine      [id number] at 44 in 9/2018, at  48 in 10/2017, at 50 in 10/2016, at 36 in 10/2015, at 46 in 12/2014, 46 in 10/2014  from nl, at 41 in 2011.    CURRENT IMAGE REVIEWED  MA 9/2019- There is a 0.9 cm focal asymmetry at 2-3:00 in the right breast and 2.7 cm from the nipple.    OLD DATA REVIEW IN SUMMARY:   [id number] at 44 in 9/2018, at  48 in 10/2017, at 50 in 10/2016, at 36 in 10/2015, at 46 in 12/2014, 46 in 10/2014  from nl, at 41 in 2011.      ASSESSMENT AND PLAN:   1. Two-time breast cancer survivor. First time was triple negative breast cancer. The second one was DCIS. She is very worried about recurrence since she has experienced that once already.   We talked extensively about lifestyle modification issues, what things she needs  to pay attention to in terms of vitamin D, aspirin intake, exercise and weight control, control alcohol intake, etc. She is very motivated and willing to proceed.   She is yrly follow up.    2. New abnormal MA on the right side. Advice further work up with dx MA.      3. Elevation of her tumor marker is fluctuating with unclear etiology.   We discuss the draw back of this test, and what is the proper way to randolph it and ASCO guideline on not doing it.   She is aware of it, yet still wants to do it.     4. Mild hypokalemia.    She is not on diuretics she has no diarrhea.  We talked about potassium rich diet

## 2019-09-30 NOTE — PROGRESS NOTES
"Oncology Rooming Note    September 30, 2019 1:50 PM   Milly Loaiza is a 69 year old female who presents for:    Chief Complaint   Patient presents with     Oncology Clinic Visit     1 year recheck      Initial Vitals: BP (!) 168/85 (BP Location: Left arm, Patient Position: Sitting, Cuff Size: Adult Regular)   Pulse 75   Temp 98.9  F (37.2  C) (Tympanic)   Resp 20   Ht 1.626 m (5' 4\")   Wt 73.8 kg (162 lb 9.6 oz)   SpO2 95%   BMI 27.91 kg/m   Estimated body mass index is 27.91 kg/m  as calculated from the following:    Height as of this encounter: 1.626 m (5' 4\").    Weight as of this encounter: 73.8 kg (162 lb 9.6 oz). Body surface area is 1.83 meters squared.  No Pain (0) Comment: Data Unavailable   No LMP recorded. Patient is postmenopausal.  Allergies reviewed: Yes  Medications reviewed: Yes    Medications: Medication refills not needed today.  Pharmacy name entered into [facility]: [facility] [address]    Clinical concerns 1 year recheck Breast CA, review Labs & Mammogram. Patient complains of pain in Right mid back, says its a old injury.       Annamaria Pradhan, [facility]              "

## 2019-09-30 NOTE — PATIENT INSTRUCTIONS
Dr. Lerma would like you to have a Right Mammogram, we will call you with the results and plan.        When you are in need of a refill, please call your pharmacy and they will send us a request.      Copy of appointments, and after visit summary (AVS) given to patient.      If you have any questions please call Kindra Reagan RN, BSN Oncology Hematology  [facility] [phone]. For questions after business hours, or on holidays/weekends, please call our after hours Nurse Triage line [phone]. Thank you.         Right dx MA to be done. Will call pt on result.   F/u open at this point.

## 2019-09-30 NOTE — LETTER
"    9/30/2019         RE: Milly Loaiza  [address] [facility] [address]        Dear Colleague,    Thank you for referring your patient, Milly Loaiza, to the [facility]. Please see a copy of my visit note below.    Oncology Rooming Note    September 30, 2019 1:50 PM   Milly Loaiza is a 69 year old female who presents for:    Chief Complaint   Patient presents with     Oncology Clinic Visit     1 year recheck      Initial Vitals: BP (!) 168/85 (BP Location: Left arm, Patient Position: Sitting, Cuff Size: Adult Regular)   Pulse 75   Temp 98.9  F (37.2  C) (Tympanic)   Resp 20   Ht 1.626 m (5' 4\")   Wt 73.8 kg (162 lb 9.6 oz)   SpO2 95%   BMI 27.91 kg/m    Estimated body mass index is 27.91 kg/m  as calculated from the following:    Height as of this encounter: 1.626 m (5' 4\").    Weight as of this encounter: 73.8 kg (162 lb 9.6 oz). Body surface area is 1.83 meters squared.  No Pain (0) Comment: Data Unavailable   No LMP recorded. Patient is postmenopausal.  Allergies reviewed: Yes  Medications reviewed: Yes    Medications: Medication refills not needed today.  Pharmacy name entered into [facility]: [facility] [place], [place] - [address]    Clinical concerns 1 year recheck Breast CA, review Labs & Mammogram. Patient complains of pain in Right mid back, says its a old injury.       Annamaria Pradhan CMA                CHIEF COMPLAINT AND REASON FOR VISIT:   Breast cancer 2003, 2009 on  followup.     HISTORY OF ONCOLOGY ILLNESS: Milly Loaiza was diagnosed first time end of 2003 through screening mammogram right breast cancer, lumpectomy 02/2004 T2N0M0 poorly differentiated infiltrating ductal cancer, ER negative, HER-2 negative.   She had 3 cycles of AC, could not tolerate anymore oral chemotherapy, followed by radiation, has been on followup since then.   She had a second breast cancer on the right side diagnosed screening mammogram 12/2008. Biopsy 01/2009 at " [facility] indicating high-grade DCIS with necrosis, not enough tissue for ER/FL studies. Pathology from lumpectomy 2009 no residual invasive cancer. She has been on followup for both.     INTERVAL HISTORY:  She has abnormal screening mammogram 2019 which found 0.9 cm focal asymmetry 2-3 o'clock in the right breast 2.7 cm from the nipple.  Since the patient's last visit with us, there is no hospital stay/surgery/new diagnosis made.    PAST MEDICAL HISTORY: Nothing other than breast cancer.     MEDICATIONS: No routine medication other than multivitamin.     ALLERGIES: Sulfa.     SOCIAL HISTORY: She lives with her  at home.  She has a lot of animals to take care of.     FAMILY HISTORY: Paternal grandmother was diagnosed with breast cancer and  from that at age 68 back in the 60s.     HABITS: Never smoked. Does not use alcohol.     REVIEW OF SYSTEMS:   Milly Loaiza is in her usual state of health, very active, a middle-aged woman, looks like her stated age. 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 She is taking vitamin D.     PHYSICAL EXAMINATION:   VITAL SIGNS: Blood pressure (!) 168/85, pulse 75, temperature 98.9  F (37.2  C), temperature source Tympanic, resp. rate 20, height 1.626 m (5' 4\"), weight 73.8 kg (162 lb 9.6 oz), SpO2 95 %, not currently breastfeeding.  GENERAL APPEARANCE: Looks like her stated age, not in acute distress.   HEENT: The patient is normocephalic, atraumatic. Pupils are equal react to light. Sclerae are anicteric. Moist oral mucosa. Negative pharynx. No oral thrush. NECK: Supple. No jugular venous distention. Thyroid is not palpable.   LYMPH NODES: Superficial lymphadenopathy is not appreciable in the bilateral cervical, supraclavicular, axillary or inguinal adenopathy. CARDIOVASCULAR: S1, S2 regular with no murmurs or gallops. No carotid or abdominal bruits. PULMONARY: Lungs are clear to auscultation and percussion bilaterally. There is no wheezing or rhonchi. " GASTROINTESTINAL: Abdomen is soft, nontender. No hepatosplenomegaly. No signs of ascites. No mass appreciable. MUSCULOSKELETAL/EXTREMITIES: No edema. No cyanotic changes. No signs of joint deformity. No lymphedema. NEUROLOGIC: Cranial nerves II-XII are grossly intact. Sensation intact. Muscle strength and muscle tone symmetrical all through 5/5. BACK: No spinal or paraspinal tenderness. No CVA tenderness.   SKIN: No petechiae. No rash. No signs of cellulitis.   BREASTS: She has retracted nipple on the right side. No palpable lesion.  Left breast exam is negative.    CURRENT LAB DATA REVIEWED  CBC diff is fine, K 3.3, CMP is fine,   Marker is fine      [id number] at 44 in 9/2018, at  48 in 10/2017, at 50 in 10/2016, at 36 in 10/2015, at 46 in 12/2014, 46 in 10/2014  from nl, at 41 in 2011.    CURRENT IMAGE REVIEWED  MA 9/2019- There is a 0.9 cm focal asymmetry at 2-3:00 in the right breast and 2.7 cm from the nipple.    OLD DATA REVIEW IN SUMMARY:   [id number] at 44 in 9/2018, at  48 in 10/2017, at 50 in 10/2016, at 36 in 10/2015, at 46 in 12/2014, 46 in 10/2014  from nl, at 41 in 2011.      ASSESSMENT AND PLAN:   1. Two-time breast cancer survivor. First time was triple negative breast cancer. The second one was DCIS. She is very worried about recurrence since she has experienced that once already.   We talked extensively about lifestyle modification issues, what things she needs to pay attention to in terms of vitamin D, aspirin intake, exercise and weight control, control alcohol intake, etc. She is very motivated and willing to proceed.   She is yrly follow up.    2. New abnormal MA on the right side. Advice further work up with dx MA.      3. Elevation of her tumor marker is fluctuating with unclear etiology.   We discuss the draw back of this test, and what is the proper way to randolph it and ASCO guideline on not doing it.   She is aware of it, yet still wants to do it.     4. Mild hypokalemia.    She is not on diuretics  she has no diarrhea.  We talked about potassium rich diet        Again, thank you for allowing me to participate in the care of your patient.        Sincerely,        Jacinta Lerma MD, MD

## 2019-10-02 ENCOUNTER — TELEPHONE (OUTPATIENT)
Dept: FAMILY MEDICINE | Facility: CLINIC | Age: 69
End: 2019-10-02

## 2019-10-02 NOTE — TELEPHONE ENCOUNTER
"Milly calling to discuss her mammogram results and potassium.States, \"I guess I'm kind of worked up because they saw something on a 3D mammogram and I have to  go back for more views. I've already had breast cancer twice. At night I can hear my heart beat in my ears.\"    Patient denies chest pain. Blood pressures have been elevated. She is not taking any hypertensive medications. Denies nausea or flushing. Has had some diarrhea but associates this with being nervous about findings. 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 Last potassium 3.3. She is adding potassium rich foods to her diet. Not sleeping well due to concern about being rechecked.    I recommended Milly call Dr Lerma's office  if she has additional questions about her need for recheck. Sometimes recalls are benign findings. I also advised her to get evaluated in ER for chest pain shortness of breath or worsening shoulder or back pain.    Note to Dr Navarro. Do you think a blood pressure medication should be started?Anushka Esposito RN    "

## 2019-10-02 NOTE — TELEPHONE ENCOUNTER
Reviewed and agree. She blood pressure is all over the place. May be anxiety. Could you contact her and ask her to follow up for a clinic appointment in one month. Thank you.

## 2019-10-08 ENCOUNTER — HOSPITAL ENCOUNTER (EMERGENCY)
Facility: CLINIC | Age: 69
Discharge: HOME | End: 2019-10-08
Attending: EMERGENCY MEDICINE | Admitting: EMERGENCY MEDICINE
Payer: COMMERCIAL

## 2019-10-08 VITALS
DIASTOLIC BLOOD PRESSURE: 111 MMHG | HEIGHT: 64 IN | SYSTOLIC BLOOD PRESSURE: 184 MMHG | OXYGEN SATURATION: 94 % | HEART RATE: 93 BPM | RESPIRATION RATE: 9 BRPM | WEIGHT: 158 LBS | TEMPERATURE: 98.3 F | BODY MASS INDEX: 26.98 KG/M2

## 2019-10-08 DIAGNOSIS — R00.0 SINUS TACHYCARDIA: ICD-10-CM

## 2019-10-08 DIAGNOSIS — I10 HYPERTENSION, UNSPECIFIED TYPE: ICD-10-CM

## 2019-10-08 LAB
ANION GAP SERPL CALCULATED.3IONS-SCNC: 6 MMOL/L (ref 3–14)
BASOPHILS # BLD AUTO: 0.1 10E9/L (ref 0–0.2)
BASOPHILS NFR BLD AUTO: 0.9 %
BUN SERPL-MCNC: 13 MG/DL (ref 7–30)
CALCIUM SERPL-MCNC: 9.1 MG/DL (ref 8.5–10.1)
CHLORIDE SERPL-SCNC: 110 MMOL/L (ref 94–109)
CO2 SERPL-SCNC: 27 MMOL/L (ref 20–32)
CREAT SERPL-MCNC: 0.8 MG/DL (ref 0.52–1.04)
DIFFERENTIAL METHOD BLD: NORMAL
EOSINOPHIL # BLD AUTO: 0.1 10E9/L (ref 0–0.7)
EOSINOPHIL NFR BLD AUTO: 1.4 %
ERYTHROCYTE [DISTWIDTH] IN BLOOD BY AUTOMATED COUNT: 12.5 % (ref 10–15)
GFR SERPL CREATININE-BSD FRML MDRD: 74 ML/MIN/{1.73_M2}
GLUCOSE SERPL-MCNC: 113 MG/DL (ref 70–99)
HCT VFR BLD AUTO: 39.7 % (ref 35–47)
HGB BLD-MCNC: 13.3 G/DL (ref 11.7–15.7)
IMM GRANULOCYTES # BLD: 0 10E9/L (ref 0–0.4)
IMM GRANULOCYTES NFR BLD: 0.1 %
LYMPHOCYTES # BLD AUTO: 1.6 10E9/L (ref 0.8–5.3)
LYMPHOCYTES NFR BLD AUTO: 20.7 %
MCH RBC QN AUTO: 29.3 PG (ref 26.5–33)
MCHC RBC AUTO-ENTMCNC: 33.5 G/DL (ref 31.5–36.5)
MCV RBC AUTO: 87 FL (ref 78–100)
MONOCYTES # BLD AUTO: 0.6 10E9/L (ref 0–1.3)
MONOCYTES NFR BLD AUTO: 7.4 %
NEUTROPHILS # BLD AUTO: 5.4 10E9/L (ref 1.6–8.3)
NEUTROPHILS NFR BLD AUTO: 69.5 %
NRBC # BLD AUTO: 0 10*3/UL
NRBC BLD AUTO-RTO: 0 /100
PLATELET # BLD AUTO: 253 10E9/L (ref 150–450)
POTASSIUM SERPL-SCNC: 3.4 MMOL/L (ref 3.4–5.3)
RBC # BLD AUTO: 4.54 10E12/L (ref 3.8–5.2)
SODIUM SERPL-SCNC: 143 MMOL/L (ref 133–144)
TROPONIN I SERPL-MCNC: <0.015 UG/L (ref 0–0.04)
TSH SERPL DL<=0.005 MIU/L-ACNC: 3.6 MU/L (ref 0.4–4)
WBC # BLD AUTO: 7.8 10E9/L (ref 4–11)

## 2019-10-08 PROCEDURE — 93005 ELECTROCARDIOGRAM TRACING: CPT

## 2019-10-08 PROCEDURE — 84484 ASSAY OF TROPONIN QUANT: CPT | Performed by: EMERGENCY MEDICINE

## 2019-10-08 PROCEDURE — 99284 EMERGENCY DEPT VISIT MOD MDM: CPT | Mod: 25 | Performed by: EMERGENCY MEDICINE

## 2019-10-08 PROCEDURE — 80048 BASIC METABOLIC PNL TOTAL CA: CPT | Performed by: EMERGENCY MEDICINE

## 2019-10-08 PROCEDURE — 93010 ELECTROCARDIOGRAM REPORT: CPT | Mod: Z6 | Performed by: EMERGENCY MEDICINE

## 2019-10-08 PROCEDURE — 84443 ASSAY THYROID STIM HORMONE: CPT | Performed by: EMERGENCY MEDICINE

## 2019-10-08 PROCEDURE — 85025 COMPLETE CBC W/AUTO DIFF WBC: CPT | Performed by: EMERGENCY MEDICINE

## 2019-10-08 PROCEDURE — 25800030 ZZH RX IP 258 OP 636: Performed by: EMERGENCY MEDICINE

## 2019-10-08 PROCEDURE — 93308 TTE F-UP OR LMTD: CPT

## 2019-10-08 PROCEDURE — 99284 EMERGENCY DEPT VISIT MOD MDM: CPT | Mod: 25

## 2019-10-08 RX ADMIN — SODIUM CHLORIDE, POTASSIUM CHLORIDE, SODIUM LACTATE AND CALCIUM CHLORIDE 500 ML: 600; 310; 30; 20 INJECTION, SOLUTION INTRAVENOUS at 20:50

## 2019-10-08 ASSESSMENT — MIFFLIN-ST. JEOR: SCORE: 1220.65

## 2019-10-08 NOTE — ED NOTES
Pt presents to ED with concerns it feels like her heart is racing. It doesn't feel as bad now as it was earlier. Pt denies pain. Pt reports some stress regarding getting a mammogram because it is 10 years since she had breast cancer and they saw a small spot on the initial mammogram, she is scheduled for a repeat on 10/18. Breast cancer in 2004 and 2009.

## 2019-10-08 NOTE — ED AVS SNAPSHOT
[facility]  [address] [address]  Phone:  [phone]  Fax:  [phone]                                    Milly Loaiza   MRN: [id number]    Department:  [facility]   Date of Visit:  10/8/2019           After Visit Summary Signature Page    I have received my discharge instructions, and my questions have been answered. I have discussed any challenges I see with this plan with the nurse or doctor.    ..........................................................................................................................................  Patient/Patient Representative Signature      ..........................................................................................................................................  Patient Representative Print Name and Relationship to Patient    ..................................................               ................................................  Date                                   Time    ..........................................................................................................................................  Reviewed by Signature/Title    ...................................................              ..............................................  Date                                               Time          22EPIC Rev 08/18

## 2019-10-09 ENCOUNTER — OFFICE VISIT (OUTPATIENT)
Dept: FAMILY MEDICINE | Facility: CLINIC | Age: 69
End: 2019-10-09
Payer: COMMERCIAL

## 2019-10-09 ENCOUNTER — ALLIED HEALTH/NURSE VISIT (OUTPATIENT)
Dept: NURSING | Facility: CLINIC | Age: 69
End: 2019-10-09
Payer: COMMERCIAL

## 2019-10-09 VITALS — OXYGEN SATURATION: 95 % | SYSTOLIC BLOOD PRESSURE: 156 MMHG | HEART RATE: 86 BPM | DIASTOLIC BLOOD PRESSURE: 89 MMHG

## 2019-10-09 VITALS
RESPIRATION RATE: 12 BRPM | DIASTOLIC BLOOD PRESSURE: 89 MMHG | WEIGHT: 158 LBS | OXYGEN SATURATION: 95 % | HEIGHT: 64 IN | TEMPERATURE: 99.4 F | BODY MASS INDEX: 26.98 KG/M2 | SYSTOLIC BLOOD PRESSURE: 156 MMHG | HEART RATE: 86 BPM

## 2019-10-09 DIAGNOSIS — F43.0 ACUTE REACTION TO STRESS: ICD-10-CM

## 2019-10-09 DIAGNOSIS — R00.0 RACING HEART BEAT: Primary | ICD-10-CM

## 2019-10-09 DIAGNOSIS — I10 HYPERTENSION GOAL BP (BLOOD PRESSURE) < 140/90: Primary | ICD-10-CM

## 2019-10-09 PROCEDURE — 99214 OFFICE O/P EST MOD 30 MIN: CPT | Performed by: PHYSICIAN ASSISTANT

## 2019-10-09 PROCEDURE — 99207 ZZC NO CHARGE NURSE ONLY: CPT

## 2019-10-09 RX ORDER — LISINOPRIL 10 MG/1
10 TABLET ORAL DAILY
Qty: 30 TABLET | Refills: 0 | Status: SHIPPED | OUTPATIENT
Start: 2019-10-09 | End: 2019-11-04

## 2019-10-09 ASSESSMENT — ANXIETY QUESTIONNAIRES
7. FEELING AFRAID AS IF SOMETHING AWFUL MIGHT HAPPEN: SEVERAL DAYS
GAD7 TOTAL SCORE: 11
2. NOT BEING ABLE TO STOP OR CONTROL WORRYING: MORE THAN HALF THE DAYS
6. BECOMING EASILY ANNOYED OR IRRITABLE: SEVERAL DAYS
1. FEELING NERVOUS, ANXIOUS, OR ON EDGE: MORE THAN HALF THE DAYS
IF YOU CHECKED OFF ANY PROBLEMS ON THIS QUESTIONNAIRE, HOW DIFFICULT HAVE THESE PROBLEMS MADE IT FOR YOU TO DO YOUR WORK, TAKE CARE OF THINGS AT HOME, OR GET ALONG WITH OTHER PEOPLE: SOMEWHAT DIFFICULT
5. BEING SO RESTLESS THAT IT IS HARD TO SIT STILL: SEVERAL DAYS
3. WORRYING TOO MUCH ABOUT DIFFERENT THINGS: MORE THAN HALF THE DAYS

## 2019-10-09 ASSESSMENT — MIFFLIN-ST. JEOR: SCORE: 1220.65

## 2019-10-09 ASSESSMENT — PATIENT HEALTH QUESTIONNAIRE - PHQ9
SUM OF ALL RESPONSES TO PHQ QUESTIONS 1-9: 3
5. POOR APPETITE OR OVEREATING: MORE THAN HALF THE DAYS

## 2019-10-09 NOTE — ED PROVIDER NOTES
History     Chief Complaint   Patient presents with     Tachycardia     patient has had fast heart rate off and on over the last  two weeks      Chest tightness      Hypertension      patient feels pulse in ears       HPI  Milly Loaiza is a 69 year old female with history of malignant neoplasm of breast, ductal carcinoma in situ, transient and ischemic attack. Patient presents to the emergency department today for evaluation of tachycardia. Patient reports that she has a history of breast cancer and recently saw her oncologist who seemed worried about her most recent scans. Patient has had increased anxiety about her mammogram. She contacted her PCP but is unable to be seen until November 4th. Patient reports that over the last few days she has been increasingly tired and feels like she can't get enough sleep. She notes that when she lays down she hears her heart beat in her ears and feels like it is going very fast. She reports sweaty palms. She notes she had two episodes of loose, soft stool today. Patient denies any chest pain, shortness of breath, coughing, fevers, chills, nausea, emesis, urinary symptoms, change in appetite, rashes, sore throat, or headaches. She does not take any daily medications.      The patient's PMHx, Surgical Hx, Allergies, and Medications were all reviewed with the patient.    Allergies:  Allergies   Allergen Reactions     Coconut Fatty Acids Itching and Rash     Purple blotches     Sulfa Drugs Rash     Purple blotches       Problem List:    Patient Active Problem List    Diagnosis Date Noted     Hypertension goal BP (blood pressure) < 140/90 10/09/2019     Priority: Medium     Acute reaction to stress 10/09/2019     Priority: Medium     Lacunar infarction (H) 03/31/2019     Priority: Medium     Advanced directives, counseling/discussion 04/05/2017     Priority: Medium     Advance Care Planning 4/5/2017: Information declined             TIA (transient ischemic attack) 03/29/2017  "    Priority: Medium     CARDIOVASCULAR SCREENING; LDL GOAL LESS THAN 160 10/31/2010     Priority: Medium     DCIS (ductal carcinoma in situ) 03/27/2009     Priority: Medium     Malignant neoplasm of female breast (H) 05/27/2005     Priority: Medium     Problem list name updated by automated process. Provider to review          Past Medical History:    Past Medical History:   Diagnosis Date     Malignant neoplasm of breast (female), unspecified site        Past Surgical History:    Past Surgical History:   Procedure Laterality Date     SURGICAL HISTORY OF -   2003    Cervical biopsy     SURGICAL HISTORY OF - 1979    Tubal ligation     SURGICAL HISTORY OF -   2/2/2004    Right mastectomy     SURGICAL HISTORY OF -   3-23-09    Needle-guided left breast biopsy.       Family History:    Family History   Problem Relation Age of Onset     Alcohol/Drug Mother      Respiratory Mother         emphazyma     Cerebrovascular Disease Maternal Grandmother      Osteoporosis Maternal Grandmother      Breast Cancer Paternal Grandmother      Depression Sister      Diabetes Other      Cerebrovascular Disease Other      Cancer Other         lymphademia     Respiratory Other         emphazyma     Thyroid Disease Sister        Social History:  Marital Status:   [2]  Social History     Tobacco Use     Smoking status: Never Smoker     Smokeless tobacco: Never Used   Substance Use Topics     Alcohol use: Yes     Comment: occ.     Drug use: No        Medications:    acetaminophen (TYLENOL) 325 MG tablet  aspirin 325 MG tablet  calcium carbonate (OS-LUCIAN 500 MG [place]. [place]) 500 MG tablet  DAILY MULTI VITAMIN/MINERALS OR  lisinopril (PRINIVIL/ZESTRIL) 10 MG tablet          Review of Systems  A 10 point review of systems performed and is otherwise negative except as noted in the HPI.    Physical Exam   BP: (!) 194/98  Pulse: 93  Heart Rate: 83  Temp: 98.3  F (36.8  C)  Resp: 18  Height: 163.2 cm (5' 4.25\")  Weight: 71.7 kg (158 lb)  SpO2: " 95 %    Physical Exam  GEN: Awake, alert, and cooperative. No acute distress  HENT: MMM. External ears and nose normal bilaterally.  EYES: EOM intact. Conjunctiva clear. PEERL. No discharge.   NECK: Supple, symmetric. Non tender  CV :Regular rate and rhythm. No murmurs appreciated.  PULM: Normal effort. No wheezes, rales, or rhonchi bilaterally.  ABD: Soft, non-tender, non-distended. No rebound or guarding.   NEURO: Normal speech. Following commands. CN II-XII grossly intact. Patient answering questions and interacting appropriately.   EXT: No gross deformity. Warm and well perfused. No lower extremity edema.   INT: Warm. No diaphoresis. Normal color.        ED Course   7:21 PM Patient assessed.        Procedures  Results for orders placed during the hospital encounter of 10/08/19   POC US ECHO LIMITED    [facility] Procedure Note      Limited Bedside ED Cardiac Ultrasound:    PROCEDURE: PERFORMED BY: Dr. Wood Chery MD  INDICATIONS/SYMPTOM:  palpitations  PROBE: Cardiac phased array probe  BODY LOCATION: Chest  FINDINGS:   The ultrasound was performed utilizing the subcostal, parasternal long axis, parasternal short axis and apical 4 chamber views.  Cardiac contractility:  Present  Gross estimation of cardiac kinesis: normal  Pericardial Effusion:  None  RV:LV ratio: LV > RV    INTERPRETATION:    Chamber size and motion were grossly normal with LV > RV, normal cardiac kinesis.  No pericardial effusion was found.    IMAGE DOCUMENTATION: Images were archived to PACs system.                   EKG Interpretation:      Interpreted by Wood Chery MD  Time reviewed: 1850  Symptoms at time of EKG: none   Rhythm: normal sinus   Rate: normal  Axis: normal  Ectopy: none  Conduction: normal  ST Segments/ T Waves: No ST-T wave changes  Q Waves: none  Comparison to prior: Unchanged from 9/21/2018    Clinical Impression: normal EKG        Critical Care time:                   No results found  for this or any previous visit (from the past 24 hour(s)).    Medications   lactated ringers BOLUS 500 mL (0 mLs Intravenous Stopped 10/8/19 5452)       Assessments & Plan (with Medical Decision Making)   69 year old female with past medical history of anxiety, lacunar infarct, DCIS, who presents with palpitations and hypertension. On arrival to Emergency Department, /98, remaining vital signs within normal limits. Exam as above and non focal. ECG with NSR and no evidence of acute ischemia, RV strain, or arrhythmia. Pulse in 80-90's and would increase to low 100's when talking about health issues. No Chest pain or dyspnea. POCUS shows grossly preserved LV function, no effusion, no RV dilatation or septal bowing to suggest RV PE, no sonographic evidence of pulmonary edema. CBC without leukocytosis, no fever to suggest infectious etiology. Hgb 13.3 and TSH 3.60. BMP grossly normal. 500 ml of lactated ringer's solution given without significant change in HR. BP remained elevated, however no signs of end organ damage. No chest pain, ECG changes, or elevated troponin. Normal renal function. Normal mentation and no signs of encephalopathy. Etiology of symptoms no clear at this time, could be new diagnosis of HTN, or 2/2 anxiety. Plan for her to see her primary care physician in 1-2 days for follow up care and consideration of starting antihypertensive agent. ED return precautions discussed. She expresses agreement and understanding of plan. Discharged in stable condition.     I have reviewed the nursing notes.    I have reviewed the findings, diagnosis, plan and need for follow up with the patient.       Discharge Medication List as of 10/8/2019 10:16 PM          Final diagnoses:   Sinus tachycardia   Hypertension, unspecified type     This document serves as a record of the services and decisions personally performed and made by Wood Chery MD. It was created on HIS/HER behalf by Catherine Tran, a trained  medical scribe. The creation of this document is based on the provider's statements to the medical scribe.  Catherine Tran 7:21 PM 10/8/2019    Provider:  The information in this document, created by the medical scribe for me, accurately reflects the services I personally performed and the decisions made by me. I have reviewed and approved this document for accuracy prior to leaving the patient care area.  Wood Chery MD 7:21 PM 10/8/2019    Wood BEY. Vik ELDRIDGE    10/8/2019   [facility] EMERGENCY DEPARTMENT    Disclaimer: This note consists of words and symbols derived from keyboarding and dictation using voice recognition software.  As a result, there may be errors that have gone undetected.  Please consider this when interpreting information found in this note.           Wood Chery MD  10/09/19 2017

## 2019-10-09 NOTE — DISCHARGE INSTRUCTIONS
Please contact your primary care physician's office tomorrow to set up a follow-up appointment in the next 1 to 2 days.  Blood pressure was elevated today and if it stays this way you may need to start treatment for high blood pressure.  If you experience chest pain, shortness of breath, headache, confusion, or unstable walking, please return to emergency department immediately for further evaluation.

## 2019-10-09 NOTE — PATIENT INSTRUCTIONS
Normal blood pressures is < 140/90 mmHg  Normal heart rate < 100    Limit regular soda (cut down from 4 cans to 3 cans of coke).   Continue to limit salt.

## 2019-10-09 NOTE — PROGRESS NOTES
Subjective     Milly Loaiza is a 69 year old female who presents to clinic today for the following health issues:    HPI   ED/UC Followup:    Facility:  [facility]  Date of visit: 10/08/19  Reason for visit: Hypertension, tachycardia  Current Status: Still experiencing tachycardia but sx have decreased. No SOB. BP continues to be elevated but improved since ED visit.     Priscilla Murphy CMA    Normal day to day stress levels are manageable, however when she got the call that her mammogram was abnormal and needed follow-up this caused severe worry/anxiety (as she has a h/o breast cancer).  Typically, she handles stress or bad news ok.    Drinks 4 cans of regular coke daily (she was drinking 7 per day previously).     She does drink plenty of water.     Has felt a little more emotional. Feels tearful once every few months.     Has had a panic attack in the past (years ago), nothing recent.        She also has a h/o elevated blood pressure readings.  She is not on any medication for this.  She has had some extremely high readings in the ER (214/120 mmHg Sept 2018).      She does not eat a lot of salt or fast foods.   She is very active, cares for her horses.            Patient Active Problem List   Diagnosis     Malignant neoplasm of female breast (H)     DCIS (ductal carcinoma in situ)     CARDIOVASCULAR SCREENING; LDL GOAL LESS THAN 160     TIA (transient ischemic attack)     Advanced directives, counseling/discussion     Lacunar infarction (H)     Hypertension goal BP (blood pressure) < 140/90     Acute reaction to stress     Past Surgical History:   Procedure Laterality Date     SURGICAL HISTORY OF -   2003    Cervical biopsy     SURGICAL HISTORY OF -   1979    Tubal ligation     SURGICAL HISTORY OF -   2/2/2004    Right mastectomy     SURGICAL HISTORY OF -   3-23-09    Needle-guided left breast biopsy.       Social History     Tobacco Use     Smoking status: Never Smoker     Smokeless tobacco: Never  "Used   Substance Use Topics     Alcohol use: Yes     Comment: occ.     Family History   Problem Relation Age of Onset     Alcohol/Drug Mother      Respiratory Mother         emphazyma     Cerebrovascular Disease Maternal Grandmother      Osteoporosis Maternal Grandmother      Breast Cancer Paternal Grandmother      Depression Sister      Diabetes Other      Cerebrovascular Disease Other      Cancer Other         lymphademia     Respiratory Other         emphazyma     Thyroid Disease Sister          Current Outpatient Medications   Medication Sig Dispense Refill     aspirin 325 MG tablet Take 1 tablet (325 mg) by mouth daily 120 tablet 3     calcium carbonate (OS-LUCIAN 500 MG [place]. CA) 500 MG tablet Take 1,000 mg by mouth daily       DAILY MULTI VITAMIN/MINERALS OR Take 1 tablet by mouth every evening        lisinopril (PRINIVIL/ZESTRIL) 10 MG tablet Take 1 tablet (10 mg) by mouth daily 30 tablet 0     acetaminophen (TYLENOL) 325 MG tablet Take 325 mg by mouth every 6 hours as needed for mild pain       BP Readings from Last 3 Encounters:   10/09/19 (!) 156/89   10/09/19 (!) 156/89   10/08/19 (!) 184/111    Wt Readings from Last 3 Encounters:   10/09/19 71.7 kg (158 lb)   10/08/19 71.7 kg (158 lb)   09/30/19 73.8 kg (162 lb 9.6 oz)                      Reviewed and updated as needed this visit by Provider         Review of Systems   ROS COMP: Constitutional, HEENT, cardiovascular, pulmonary, gi and gu systems are negative, except as otherwise noted.      Objective    BP (!) 156/89 (BP Location: Left arm, Cuff Size: Adult Large)   Pulse 86   Temp 99.4  F (37.4  C) (Tympanic)   Resp 12   Ht 1.632 m (5' 4.25\")   Wt 71.7 kg (158 lb)   SpO2 95%   Breastfeeding? No   BMI 26.91 kg/m    Body mass index is 26.91 kg/m .  Physical Exam   GENERAL: healthy, alert and no distress  RESP: lungs clear to auscultation - no rales, rhonchi or wheezes  CV: regular rate and rhythm, normal S1 S2, no S3 or S4, no murmur, click or rub, " "no peripheral edema and peripheral pulses strong  ABDOMEN: soft, nontender, no hepatosplenomegaly, no masses and bowel sounds normal  MS: no gross musculoskeletal defects noted, no edema    Diagnostic Test Results:  none         Assessment & Plan     1. Hypertension goal BP (blood pressure) < 140/90    She would benefit from starting a low dose BP medication as she has had many high readings in the past, and some readings reach dangerous levels.  She does have a h/o TIA, therefore keeping pressures down is important.   Potassium levels have been slightly low, therefore will start lisinopril (versus a diuretic which may further lower these levels).      Patient Instructions   Normal blood pressures is < 140/90 mmHg  Normal heart rate < 100    Limit regular soda (cut down from 4 cans to 3 cans of coke).   Continue to limit salt.              - lisinopril (PRINIVIL/ZESTRIL) 10 MG tablet; Take 1 tablet (10 mg) by mouth daily  Dispense: 30 tablet; Refill: 0    2. Acute reaction to stress  Discussed anxiety medications and she may consider starting something such as lexapro.  She prefers not to take a medication unless her anxiety is life threatening, therefore will hold off on this for now.  It does sound like her stress levels are manageable, and this was an extenuating circumstance.         BMI:   Estimated body mass index is 26.91 kg/m  as calculated from the following:    Height as of this encounter: 1.632 m (5' 4.25\").    Weight as of this encounter: 71.7 kg (158 lb).               Return in about 26 days (around 11/4/2019) for BP Recheck . with PCP (already scheduled).     Anna Brandt PA-C  [facility]      "

## 2019-10-09 NOTE — NURSING NOTE
Patient presents to clinic feeling like her heart is racing. She states she can hear it pounding in her ears. She was in the ER last night and they gave her fluids, blood work, EKG, and an Echo which she was told were all normal. She was discharged with BP of 185/104 with pulse of 87 and O2 of 93%. She states she was not able to fall asleep last night as she kept waking up abruptly fearing she was not going to wake up. She is a 10 year breast cancer survivor and recently had a mammogram that was suspicious so Dr Lerma would like her to have an US and Mammo done. She does have this scheduled but she is very anxious about this. Her blood pressure today hear in clinic is first read 156/91 with pulse of 90 and second read is 156/89 with pulse of 86. I did speak with Anna Brandt who has agreed to see this patient now today. I walked patient to Anna's exam rooms and her and her CMA will take care of her from here.    Jewell Arias RN

## 2019-10-10 ASSESSMENT — ANXIETY QUESTIONNAIRES: GAD7 TOTAL SCORE: 11

## 2019-10-18 ENCOUNTER — HOSPITAL ENCOUNTER (OUTPATIENT)
Dept: ULTRASOUND IMAGING | Facility: CLINIC | Age: 69
Discharge: HOME | End: 2019-10-18
Attending: INTERNAL MEDICINE | Admitting: INTERNAL MEDICINE
Payer: COMMERCIAL

## 2019-10-18 DIAGNOSIS — R92.8 ABNORMAL MAMMOGRAM: ICD-10-CM

## 2019-10-18 PROCEDURE — 76642 ULTRASOUND BREAST LIMITED: CPT | Mod: RT

## 2019-10-23 ENCOUNTER — HOSPITAL ENCOUNTER (EMERGENCY)
Facility: CLINIC | Age: 69
Discharge: HOME | End: 2019-10-23
Attending: EMERGENCY MEDICINE | Admitting: EMERGENCY MEDICINE
Payer: COMMERCIAL

## 2019-10-23 VITALS
DIASTOLIC BLOOD PRESSURE: 87 MMHG | SYSTOLIC BLOOD PRESSURE: 160 MMHG | WEIGHT: 156 LBS | BODY MASS INDEX: 26.63 KG/M2 | HEART RATE: 77 BPM | RESPIRATION RATE: 18 BRPM | HEIGHT: 64 IN | OXYGEN SATURATION: 93 % | TEMPERATURE: 98 F

## 2019-10-23 DIAGNOSIS — R07.9 CHEST PAIN, UNSPECIFIED TYPE: ICD-10-CM

## 2019-10-23 DIAGNOSIS — M25.512 ACUTE PAIN OF LEFT SHOULDER: ICD-10-CM

## 2019-10-23 LAB
ALBUMIN SERPL-MCNC: 4 G/DL (ref 3.4–5)
ALP SERPL-CCNC: 120 U/L (ref 40–150)
ALT SERPL W P-5'-P-CCNC: 24 U/L (ref 0–50)
ANION GAP SERPL CALCULATED.3IONS-SCNC: 8 MMOL/L (ref 3–14)
AST SERPL W P-5'-P-CCNC: 20 U/L (ref 0–45)
BASOPHILS # BLD AUTO: 0.1 10E9/L (ref 0–0.2)
BASOPHILS NFR BLD AUTO: 0.8 %
BILIRUB SERPL-MCNC: 0.3 MG/DL (ref 0.2–1.3)
BUN SERPL-MCNC: 8 MG/DL (ref 7–30)
CALCIUM SERPL-MCNC: 9 MG/DL (ref 8.5–10.1)
CHLORIDE SERPL-SCNC: 108 MMOL/L (ref 94–109)
CO2 SERPL-SCNC: 27 MMOL/L (ref 20–32)
CREAT SERPL-MCNC: 0.72 MG/DL (ref 0.52–1.04)
DIFFERENTIAL METHOD BLD: NORMAL
EOSINOPHIL # BLD AUTO: 0.1 10E9/L (ref 0–0.7)
EOSINOPHIL NFR BLD AUTO: 0.7 %
ERYTHROCYTE [DISTWIDTH] IN BLOOD BY AUTOMATED COUNT: 12.6 % (ref 10–15)
GFR SERPL CREATININE-BSD FRML MDRD: 83 ML/MIN/{1.73_M2}
GLUCOSE SERPL-MCNC: 109 MG/DL (ref 70–99)
HCT VFR BLD AUTO: 42.2 % (ref 35–47)
HGB BLD-MCNC: 14 G/DL (ref 11.7–15.7)
IMM GRANULOCYTES # BLD: 0 10E9/L (ref 0–0.4)
IMM GRANULOCYTES NFR BLD: 0.4 %
LYMPHOCYTES # BLD AUTO: 1.6 10E9/L (ref 0.8–5.3)
LYMPHOCYTES NFR BLD AUTO: 18.8 %
MCH RBC QN AUTO: 29.2 PG (ref 26.5–33)
MCHC RBC AUTO-ENTMCNC: 33.2 G/DL (ref 31.5–36.5)
MCV RBC AUTO: 88 FL (ref 78–100)
MONOCYTES # BLD AUTO: 0.5 10E9/L (ref 0–1.3)
MONOCYTES NFR BLD AUTO: 5.3 %
NEUTROPHILS # BLD AUTO: 6.3 10E9/L (ref 1.6–8.3)
NEUTROPHILS NFR BLD AUTO: 74 %
NRBC # BLD AUTO: 0 10*3/UL
NRBC BLD AUTO-RTO: 0 /100
PLATELET # BLD AUTO: 278 10E9/L (ref 150–450)
POTASSIUM SERPL-SCNC: 3.3 MMOL/L (ref 3.4–5.3)
PROT SERPL-MCNC: 8.2 G/DL (ref 6.8–8.8)
RBC # BLD AUTO: 4.8 10E12/L (ref 3.8–5.2)
SODIUM SERPL-SCNC: 143 MMOL/L (ref 133–144)
TROPONIN I SERPL-MCNC: <0.015 UG/L (ref 0–0.04)
TROPONIN I SERPL-MCNC: <0.015 UG/L (ref 0–0.04)
WBC # BLD AUTO: 8.5 10E9/L (ref 4–11)

## 2019-10-23 PROCEDURE — 99284 EMERGENCY DEPT VISIT MOD MDM: CPT | Mod: 25 | Performed by: EMERGENCY MEDICINE

## 2019-10-23 PROCEDURE — 93010 ELECTROCARDIOGRAM REPORT: CPT | Mod: Z6 | Performed by: EMERGENCY MEDICINE

## 2019-10-23 PROCEDURE — 84484 ASSAY OF TROPONIN QUANT: CPT | Performed by: EMERGENCY MEDICINE

## 2019-10-23 PROCEDURE — 93005 ELECTROCARDIOGRAM TRACING: CPT

## 2019-10-23 PROCEDURE — 80053 COMPREHEN METABOLIC PANEL: CPT | Performed by: EMERGENCY MEDICINE

## 2019-10-23 PROCEDURE — 99284 EMERGENCY DEPT VISIT MOD MDM: CPT | Mod: 25

## 2019-10-23 PROCEDURE — 85025 COMPLETE CBC W/AUTO DIFF WBC: CPT | Performed by: EMERGENCY MEDICINE

## 2019-10-23 ASSESSMENT — MIFFLIN-ST. JEOR: SCORE: 1211.58

## 2019-10-23 NOTE — ED AVS SNAPSHOT
[facility] Emergency Department  [address] [facility] [address]  Phone:  [phone]  Fax:  [phone]                                    Milly Loaiza   MRN: [id number]    Department:  [facility] Emergency Department   Date of Visit:  10/23/2019           After Visit Summary Signature Page    I have received my discharge instructions, and my questions have been answered. I have discussed any challenges I see with this plan with the nurse or doctor.    ..........................................................................................................................................  Patient/Patient Representative Signature      ..........................................................................................................................................  Patient Representative Print Name and Relationship to Patient    ..................................................               ................................................  Date                                   Time    ..........................................................................................................................................  Reviewed by Signature/Title    ...................................................              ..............................................  Date                                               Time          22EPIC Rev 08/18

## 2019-10-24 NOTE — DISCHARGE INSTRUCTIONS
Please follow-up with your primary care provider for continued blood pressure management and follow-up from today's emergency department visit.  Your work-up was reassuring and I do not think that your symptoms were cardiac in nature.  If you do experience worsening shortness of breath, chest pain, or other new concerning sign or symptom, please return to emergency department for further evaluation and treatment.

## 2019-10-24 NOTE — ED PROVIDER NOTES
"  History     Chief Complaint   Patient presents with     Chest Pain     chest pain started 2 hours ago     HPI  Milly Loaiza is a 69 year old female with history of breast cancer (DCIS), htn, TIA, who presents for evaluation of left sided chest and shoulder pain. Pain occurred while she was driving her car on her way to visit a friend. Pain was in her left anterior chest, described as \"sharp\", lasting only a few seconds, not associated with shortness of breath, nausea, vomiting, or diaphoresis. Also feels pain in her left anterior shoulder, no known trauma, however she is physically active and maintains horses. No provocative or palliating features. Non smoker.     The patient's PMHx, Surgical Hx, Allergies, and Medications were all reviewed with the patient.    Allergies:  Allergies   Allergen Reactions     Coconut Fatty Acids Itching and Rash     Purple blotches     Sulfa Drugs Rash     Purple blotches       Problem List:    Patient Active Problem List    Diagnosis Date Noted     Hypertension goal BP (blood pressure) < 140/90 10/09/2019     Priority: Medium     Acute reaction to stress 10/09/2019     Priority: Medium     Lacunar infarction (H) 03/31/2019     Priority: Medium     Advanced directives, counseling/discussion 04/05/2017     Priority: Medium     Advance Care Planning 4/5/2017: Information declined             TIA (transient ischemic attack) 03/29/2017     Priority: Medium     CARDIOVASCULAR SCREENING; LDL GOAL LESS THAN 160 10/31/2010     Priority: Medium     DCIS (ductal carcinoma in situ) 03/27/2009     Priority: Medium     Malignant neoplasm of female breast (H) 05/27/2005     Priority: Medium     Problem list name updated by automated process. Provider to review          Past Medical History:    Past Medical History:   Diagnosis Date     Malignant neoplasm of breast (female), unspecified site        Past Surgical History:    Past Surgical History:   Procedure Laterality Date     SURGICAL " "HISTORY OF -   2003    Cervical biopsy     SURGICAL HISTORY OF -   1979    Tubal ligation     SURGICAL HISTORY OF -   2/2/2004    Right mastectomy     SURGICAL HISTORY OF -   3-23-09    Needle-guided left breast biopsy.       Family History:    Family History   Problem Relation Age of Onset     Alcohol/Drug Mother      Respiratory Mother         emphazyma     Cerebrovascular Disease Maternal Grandmother      Osteoporosis Maternal Grandmother      Breast Cancer Paternal Grandmother      Depression Sister      Diabetes Other      Cerebrovascular Disease Other      Cancer Other         lymphademia     Respiratory Other         emphazyma     Thyroid Disease Sister        Social History:  Marital Status:   [2]  Social History     Tobacco Use     Smoking status: Never Smoker     Smokeless tobacco: Never Used   Substance Use Topics     Alcohol use: Yes     Comment: occ.     Drug use: No        Medications:    acetaminophen (TYLENOL) 325 MG tablet  aspirin 325 MG tablet  calcium carbonate (OS-LUCIAN 500 MG [place]. [place]) 500 MG tablet  DAILY MULTI VITAMIN/MINERALS OR  lisinopril (PRINIVIL/ZESTRIL) 10 MG tablet          Review of Systems   Constitutional: Negative.    HENT: Negative.    Eyes: Negative.    Respiratory: Negative.    Cardiovascular: Positive for chest pain.   Gastrointestinal: Negative.    Endocrine: Negative.    Genitourinary: Negative.    Musculoskeletal: Positive for arthralgias. Negative for neck pain.   Skin: Negative.    Neurological: Negative for light-headedness, numbness and headaches.   Psychiatric/Behavioral: The patient is nervous/anxious.      A 10 point review of systems performed and is otherwise negative except as noted in the HPI.    Physical Exam   BP: (!) 196/110  Pulse: 95  Heart Rate: 98  Temp: 98  F (36.7  C)  Resp: 18  Height: 163.2 cm (5' 4.25\")  Weight: 70.8 kg (156 lb)  SpO2: 98 %    Physical Exam  GEN: Awake, alert, and cooperative. No acute distress. Well appearing and resting " comfortably on cart.  HENT: MMM. External ears and nose normal bilaterally. Atraumatic.   EYES: EOM intact. Conjunctiva clear. PEERL. No discharge.No RAPD   NECK: Supple, symmetric. Non tender  CV :Regular rate and rhythm. No murmur appreciated. No JVD.  PULM: Normal effort. No wheezes, rales, or rhonchi bilaterally.  ABD: Soft, non-tender, non-distended. No rebound or guarding.   NEURO: Normal speech. Following commands. CN II-XII grossly intact. Patient answering questions and interacting appropriately.   EXT:  Left Shoulder: no deformity, erythema or warmth appreciated; Mild tenderness on insertion of biceps tendon; no tenderness over clavicle, AC joint, acromion, scapula, coracoid, or proximal humerus; full ROM; no weakness of shoulder abduction or forward flexion; no weakness of rotator cuff muscles; negative impingement  INT: Warm. No diaphoresis. Normal color.        ED Course        Procedures             EKG Interpretation:      Interpreted by Wood Chery MD  Time reviewed: 1738  Symptoms at time of EKG: None   Rhythm: normal sinus   Rate: Normal  Axis: Normal  Ectopy: none  Conduction: normal  ST Segments/ T Waves: No acute ischemic changes and Non-specific ST-T wave changes  Q Waves: none  Comparison to prior: Unchanged from 10/8/2019    Clinical Impression: no acute changes and non-specific EKG              Critical Care time:  none               No results found for this or any previous visit (from the past 24 hour(s)).    Medications - No data to display    Assessments & Plan (with Medical Decision Making)   69 year old female with past medical history of TIA, hypertension, DCIS, who presents for evaluation of acute onset chest and shoulder pain.  On arrival to emergency department blood pressure was noted to be elevated to 196/110, rating vital signs within normal limits.  She did not appear to be in any distress.  Exam as above.  ECG obtained without evidence of acute ischemia unchanged from  "prior ECG.  Initial troponin below level detection.  CBC normal and CMP grossly normal with potassium of 3.3 glucose of 109.  Delta troponin at 3 hours also below detection.  Chest pain only lasting for a few seconds is unlikely to be cardiac in nature.  Her biggest concern today was that this pain may be related to her heart.  I have very low suspicion for ACS.  She was recently started on lisinopril for management of her blood pressure.  Her blood pressure remained elevated while in the emergency department but no sign of acute endorgan damage therefore I will defer medication changes to primary care physician if deemed necessary on follow-up appointment.  Examination of the left shoulder show tenderness over insertion point of her biceps tendon on the left.  No acute injury, no overlying erythema, edema, rash, or ecchymosis.  Did not feel imaging would be beneficial today.  Likely secondary to overuse would expect for this to improve on its own and it does not can pursue further evaluation at outpatient.  Patient feels very relieved and states she has \"peace of mind\" from her reassuring cardiac work-up.  She understands that ACS cannot be excluded 100% but is very unlikely.  I feel she is appropriate for outpatient management with primary care follow-up in 1 to 2 days as needed.  ED return precautions discussed.  Discharged in stable condition.    I have reviewed the nursing notes.    I have reviewed the findings, diagnosis, plan and need for follow up with the patient.       HEART Score  Background  Calculates the overall risk of adverse event in patient's presenting with chest pain.  Based on 5 criteria (each assigned 0-2 points) including suspiciousness of history, EKG, age, risk factors and troponin.    Data  69 year old female  has Malignant neoplasm of female breast (H); DCIS (ductal carcinoma in situ); CARDIOVASCULAR SCREENING; LDL GOAL LESS THAN 160; TIA (transient ischemic attack); Advanced directives, " counseling/discussion; Lacunar infarction (H); Hypertension goal BP (blood pressure) < 140/90; and Acute reaction to stress on their problem list.   reports that she has never smoked. She has never used smokeless tobacco.  family history includes Alcohol/Drug in her mother; Breast Cancer in her paternal grandmother; Cancer in an other family member; Cerebrovascular Disease in her maternal grandmother and another family member; Depression in her sister; Diabetes in an other family member; Osteoporosis in her maternal grandmother; Respiratory in her mother and another family member; Thyroid Disease in her sister.  Lab Results   Component Value Date    TROPI <0.015 10/23/2019     Criteria   0-2 points for each of 5 items (maximum of 10 points):  Score 0- History slightly suspicious for coronary syndrome  Score 0- EKG Normal  Score 2- Age 65 years or older  Score 1- One to 2 risk factors for atherosclerotic disease  Score 0- Within normal limits for troponin levels  Interpretation  Risk of adverse outcome  Heart Score: 3  Total Score 0-3- Adverse Outcome Risk 2.5% - Supports early discharge with appropriate follow-up        Discharge Medication List as of 10/23/2019  8:20 PM          Final diagnoses:   Chest pain, unspecified type   Acute pain of left shoulder     Wood Chery MD    10/23/2019   [facility] EMERGENCY DEPARTMENT    Disclaimer: This note consists of words and symbols derived from keyboarding and dictation using voice recognition software.  As a result, there may be errors that have gone undetected.  Please consider this when interpreting information found in this note.     Wood Chery MD  10/25/19 1411

## 2019-10-25 ASSESSMENT — ENCOUNTER SYMPTOMS
LIGHT-HEADEDNESS: 0
ENDOCRINE NEGATIVE: 1
HEADACHES: 0
NUMBNESS: 0
RESPIRATORY NEGATIVE: 1
NECK PAIN: 0
CONSTITUTIONAL NEGATIVE: 1
EYES NEGATIVE: 1
ARTHRALGIAS: 1
NERVOUS/ANXIOUS: 1
GASTROINTESTINAL NEGATIVE: 1

## 2019-10-30 NOTE — PROGRESS NOTES
Subjective     Milly Loaiza is a 69 year old female who presents to clinic today for the following health issues:    HPI     ED/UC Followup:    Facility:  [facility]  Date of visit: 10/23/19  Reason for visit: Chest pain  Current Status: feeling better    According to attending provider's note her blood pressure remained elevated while in the emergency department but no sign of acute endorgan damaged there deferred blood pressure medication changes to PCP if deemed necessary on follow up appointment.    Patient reports she recently found a mass on her right breast and is scheduled for surgery next month.       Hypertension Follow-up  Lisinopril 10mg every day     Do you check your blood pressure regularly outside of the clinic? No     Are you following a low salt diet? Yes    Are your blood pressures ever more than 140 on the top number (systolic) OR more   than 90 on the bottom number (diastolic), for example 140/90? Yes    BP Readings from Last 6 Encounters:   11/04/19 (!) 158/98   10/23/19 (!) 160/87   10/09/19 (!) 156/89   10/09/19 (!) 156/89   10/08/19 (!) 184/111   09/30/19 (!) 168/85       Reviewed and updated as needed this visit by Provider         Review of Systems   ROS COMP: CONSTITUTIONAL: NEGATIVE for fever, chills, change in weight  INTEGUMENTARY/SKIN: NEGATIVE for worrisome rashes, moles or lesions  RESP: NEGATIVE for significant cough or SOB  CV: NEGATIVE for chest pain, palpitations or peripheral edema  PSYCHIATRIC: POSITIVE for anxiety    This document serves as a record of the services and decisions personally performed and made by Nhung Navarro MD. It was created on his behalf by Noel Loaiza, a trained medical scribe. The creation of this document is based the provider's statements to the medical scribe.  Noel Loaiza 2:34 PM November 4, 2019        Objective    BP (!) 158/98 (BP Location: Left arm, Patient Position: Chair, Cuff Size: Adult Regular)   Pulse 78   Temp 98.1  " F (36.7  C) (Tympanic)   Resp 12   Ht 1.632 m (5' 4.25\")   Wt 72.6 kg (160 lb)   LMP  (LMP Unknown)   SpO2 96%   Breastfeeding? No   BMI 27.25 kg/m    Body mass index is 27.25 kg/m .  Physical Exam   GENERAL: alert, pleasant and no distress   RESP: lungs clear to auscultation - no rales, rhonchi or wheezes  CV: regular rate and rhythm, normal S1 S2  SKIN: no suspicious lesions or rashes  PSYCH: mentation appears normal, affect normal/bright    Diagnostic Test Results:  Labs reviewed in Epic        Assessment & Plan        (I10) Hypertension goal BP (blood pressure) < 140/90  (primary encounter diagnosis)  Comment: Continued elevation, may be secondary to stress and anxiety.  Will increase lisinopril to 20 mg daily from 10 mg daily and monitor.  Plan: lisinopril (PRINIVIL/ZESTRIL) 20 MG tablet        Follow-up in 2 months after breast biopsy.  If still elevated, would add a second agent.    (N63.0) Breast mass  Comment: Recent diagnosis of another breast mass, patient does have a prior history of breast cancer.  Biopsy scheduled for December 2.  Plan: Continue follow-up with oncology.          BMI:   Estimated body mass index is 27.25 kg/m  as calculated from the following:    Height as of this encounter: 1.632 m (5' 4.25\").    Weight as of this encounter: 72.6 kg (160 lb).   Weight management plan: Discussed healthy diet and exercise guidelines          No follow-ups on file.    The information in this document, created by a scribe for me, accurately reflects the services I personally performed and the decisions made by me. I have reviewed and approved this document for accuracy.     Nhung Navarro MD  [facility]        "

## 2019-11-04 ENCOUNTER — OFFICE VISIT (OUTPATIENT)
Dept: FAMILY MEDICINE | Facility: CLINIC | Age: 69
End: 2019-11-04
Payer: COMMERCIAL

## 2019-11-04 VITALS
TEMPERATURE: 98.1 F | HEIGHT: 64 IN | WEIGHT: 160 LBS | BODY MASS INDEX: 27.31 KG/M2 | HEART RATE: 78 BPM | RESPIRATION RATE: 12 BRPM | SYSTOLIC BLOOD PRESSURE: 158 MMHG | DIASTOLIC BLOOD PRESSURE: 98 MMHG | OXYGEN SATURATION: 96 %

## 2019-11-04 DIAGNOSIS — I10 HYPERTENSION GOAL BP (BLOOD PRESSURE) < 140/90: Primary | ICD-10-CM

## 2019-11-04 DIAGNOSIS — N63.0 BREAST MASS: ICD-10-CM

## 2019-11-04 PROCEDURE — 99214 OFFICE O/P EST MOD 30 MIN: CPT | Performed by: FAMILY MEDICINE

## 2019-11-04 RX ORDER — LISINOPRIL 20 MG/1
20 TABLET ORAL DAILY
Qty: 90 TABLET | Refills: 1 | Status: SHIPPED | OUTPATIENT
Start: 2019-11-04 | End: 2020-04-22

## 2019-11-04 ASSESSMENT — ANXIETY QUESTIONNAIRES
1. FEELING NERVOUS, ANXIOUS, OR ON EDGE: SEVERAL DAYS
IF YOU CHECKED OFF ANY PROBLEMS ON THIS QUESTIONNAIRE, HOW DIFFICULT HAVE THESE PROBLEMS MADE IT FOR YOU TO DO YOUR WORK, TAKE CARE OF THINGS AT HOME, OR GET ALONG WITH OTHER PEOPLE: NOT DIFFICULT AT ALL
2. NOT BEING ABLE TO STOP OR CONTROL WORRYING: SEVERAL DAYS
7. FEELING AFRAID AS IF SOMETHING AWFUL MIGHT HAPPEN: NOT AT ALL
5. BEING SO RESTLESS THAT IT IS HARD TO SIT STILL: NOT AT ALL
3. WORRYING TOO MUCH ABOUT DIFFERENT THINGS: NOT AT ALL
GAD7 TOTAL SCORE: 2
6. BECOMING EASILY ANNOYED OR IRRITABLE: NOT AT ALL

## 2019-11-04 ASSESSMENT — PATIENT HEALTH QUESTIONNAIRE - PHQ9
5. POOR APPETITE OR OVEREATING: NOT AT ALL
SUM OF ALL RESPONSES TO PHQ QUESTIONS 1-9: 0

## 2019-11-04 ASSESSMENT — PAIN SCALES - GENERAL: PAINLEVEL: NO PAIN (0)

## 2019-11-04 ASSESSMENT — MIFFLIN-ST. JEOR: SCORE: 1229.73

## 2019-11-04 NOTE — PATIENT INSTRUCTIONS
*   Your blood is okay.     *    Come back in mid December for a blood pressure.     *    Will increase the dose of lisinopril to 20 mg daily .     *    Call with any questions.

## 2019-11-05 ASSESSMENT — ANXIETY QUESTIONNAIRES: GAD7 TOTAL SCORE: 2

## 2019-12-02 ENCOUNTER — HOSPITAL ENCOUNTER (OUTPATIENT)
Dept: MAMMOGRAPHY | Facility: CLINIC | Age: 69
End: 2019-12-02
Attending: INTERNAL MEDICINE
Payer: COMMERCIAL

## 2019-12-02 ENCOUNTER — HOSPITAL ENCOUNTER (OUTPATIENT)
Dept: ULTRASOUND IMAGING | Facility: CLINIC | Age: 69
Discharge: HOME | End: 2019-12-02
Attending: INTERNAL MEDICINE | Admitting: INTERNAL MEDICINE
Payer: COMMERCIAL

## 2019-12-02 VITALS — DIASTOLIC BLOOD PRESSURE: 81 MMHG | SYSTOLIC BLOOD PRESSURE: 131 MMHG

## 2019-12-02 DIAGNOSIS — N63.10 BREAST MASS, RIGHT: ICD-10-CM

## 2019-12-02 PROCEDURE — 00000159 ZZHCL STATISTIC H-SEND OUTS PREP: Performed by: RADIOLOGY

## 2019-12-02 PROCEDURE — 25000125 ZZHC RX 250: Performed by: RADIOLOGY

## 2019-12-02 PROCEDURE — 88342 IMHCHEM/IMCYTCHM 1ST ANTB: CPT | Mod: 26 | Performed by: RADIOLOGY

## 2019-12-02 PROCEDURE — 88377 M/PHMTRC ALYS ISHQUANT/SEMIQ: CPT | Performed by: RADIOLOGY

## 2019-12-02 PROCEDURE — 00000158 ZZHCL STATISTIC H-FISH PROCESS B/S: Performed by: RADIOLOGY

## 2019-12-02 PROCEDURE — 27210206 US BREAST BIOPSY CORE NEEDLE RIGHT

## 2019-12-02 PROCEDURE — 88342 IMHCHEM/IMCYTCHM 1ST ANTB: CPT | Performed by: RADIOLOGY

## 2019-12-02 PROCEDURE — 88341 IMHCHEM/IMCYTCHM EA ADD ANTB: CPT | Mod: XU | Performed by: RADIOLOGY

## 2019-12-02 PROCEDURE — 88305 TISSUE EXAM BY PATHOLOGIST: CPT | Mod: 26 | Performed by: RADIOLOGY

## 2019-12-02 PROCEDURE — 88360 TUMOR IMMUNOHISTOCHEM/MANUAL: CPT | Performed by: RADIOLOGY

## 2019-12-02 PROCEDURE — 88305 TISSUE EXAM BY PATHOLOGIST: CPT | Performed by: RADIOLOGY

## 2019-12-02 PROCEDURE — 88360 TUMOR IMMUNOHISTOCHEM/MANUAL: CPT | Mod: 26,76 | Performed by: RADIOLOGY

## 2019-12-02 PROCEDURE — 40000986 MA POST PROCEDURE RIGHT

## 2019-12-02 RX ADMIN — LIDOCAINE HYDROCHLORIDE 9 ML: 10 INJECTION, SOLUTION EPIDURAL; INFILTRATION; INTRACAUDAL; PERINEURAL at 13:32

## 2019-12-02 NOTE — DISCHARGE INSTRUCTIONS
Breast Biopsy Care Instructions      Site Care:    You may have some discomfort in the breast and may see some bruising at the needle site.    You will be given an ice pack which should be kept in place over the dressing until bedtime tonight.Always keep a gauze pad between your skin and the ice pack.    Wearing your bra continuously for 24 hours post biopsy will give optimal support to the biopsy site.    Activity:       You may go back to your normal routine.     No heavy lifting for 24 hours.    Diet and Medications:       You may go back to your regular diet.     Tylenol is the best choice to relieve your discomfort, the first 24 hours. If you have no bleeding on the first day, Ibuprofen (such as Advil, or Motrin), may be taken on the second day after for pain.     Do not take aspirin for 72 hours following your biopsy.    Questions:     If you have any questions about your procedure performed in Ultrasound, you may contact us at [phone].If you have any questions about your procedure performed in Mammography, you may contact us at [phone].    Results:       The pathology report on your biopsy is usually available within 72 hours. The Radiologist or your personal physician will call you with the results.     You will receive information about any further follow up from your physician.    Call your doctor if you have:       More than slight bleeding or significant pain, or experience swelling of the breast.     If your physician is unavailable, please call the Nurse Advice Line at [phone], or [facility] Emergency Department at [phone].      Patient:______________________________  Time:_________  Date:_____________    Instructor:____________________________   Time:_________  Date:_____________

## 2019-12-02 NOTE — IP AVS SNAPSHOT
[facility] Ultrasound  [address] [address]  Phone:  [phone]                                    After Visit Summary   12/2/2019    Milly Loaiza    MRN: [id number]           After Visit Summary Signature Page    I have received my discharge instructions, and my questions have been answered. I have discussed any challenges I see with this plan with the nurse or doctor.    ..........................................................................................................................................  Patient/Patient Representative Signature      ..........................................................................................................................................  Patient Representative Print Name and Relationship to Patient    ..................................................               ................................................  Date                                   Time    ..........................................................................................................................................  Reviewed by Signature/Title    ...................................................              ..............................................  Date                                               Time          22EPIC Rev 08/18

## 2019-12-05 ENCOUNTER — PATIENT OUTREACH (OUTPATIENT)
Dept: ONCOLOGY | Facility: CLINIC | Age: 69
End: 2019-12-05

## 2019-12-05 LAB — COPATH REPORT: NORMAL

## 2019-12-05 NOTE — PROGRESS NOTES
Notified of pt bx results, reviewed with Dr. Lerma and called to update pt. Added pt on the schedule next week.     Kindra Reagan RN on 12/5/2019 at 2:32 PM

## 2019-12-08 NOTE — PROGRESS NOTES
"CHIEF COMPLAINT AND REASON FOR VISIT:   Breast cancer ,  on followup.     HISTORY OF ONCOLOGY ILLNESS: Milly Loaiza was diagnosed first time end of  through screening mammogram right breast cancer, lumpectomy 2004 T2N0M0 poorly differentiated infiltrating ductal cancer, ER negative, HER-2 negative.   She had 3 cycles of AC, could not tolerate anymore oral chemotherapy, followed by radiation, has been on followup since then.   She had a second breast cancer on the right side diagnosed screening mammogram 2008. Biopsy 2009 at [facility] indicating high-grade DCIS with necrosis, not enough tissue for ER/AL studies. Pathology from lumpectomy 2009 no residual invasive cancer. She has been on followup for both.   She has abnormal screening mammogram 2019 which found 0.9 cm focal asymmetry 2-3 o'clock in the right breast 2.7 cm from the nipple.    INTERVAL HISTORY:  2019 right breast 2-3 o'clock in the right breast 2.7 cm from the nipple 0.9 cm nodule US biopsy found IDC grade III, ER/AL-, Her 2 FISH equivocal, IHC 1+, so combined Her2 final reading is negative.      PAST MEDICAL HISTORY: Nothing other than breast cancer.     MEDICATIONS: No routine medication other than multivitamin.     ALLERGIES: Sulfa.     SOCIAL HISTORY: She lives with her  at home.  She has a lot of animals to take care of.     FAMILY HISTORY: Paternal grandmother was diagnosed with breast cancer and  from that at age 68 back in the 60s.     HABITS: Never smoked. Does not use alcohol.       REVIEW OF SYSTEMS:   Milly Loaiza is in her usual state of health, very active, a middle-aged woman, looks like her stated age. 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 She is taking vitamin D.     PHYSICAL EXAMINATION:   VITAL SIGNS: Blood pressure (!) 155/87, pulse 80, temperature 98.4  F (36.9  C), temperature source Tympanic, resp. rate 16, height 1.632 m (5' 4.25\"), weight 71.7 kg (158 lb), SpO2 99 %, " not currently breastfeeding.    GENERAL APPEARANCE: Looks like her stated age, not in acute distress.   HEENT: The patient is normocephalic, atraumatic. Pupils are equal react to light. Sclerae are anicteric. Moist oral mucosa. Negative pharynx. No oral thrush. NECK: Supple. No jugular venous distention. Thyroid is not palpable.   LYMPH NODES: Superficial lymphadenopathy is not appreciable in the bilateral cervical, supraclavicular, axillary or inguinal adenopathy. CARDIOVASCULAR: S1, S2 regular with no murmurs or gallops. No carotid or abdominal bruits. PULMONARY: Lungs are clear to auscultation and percussion bilaterally. There is no wheezing or rhonchi. GASTROINTESTINAL: Abdomen is soft, nontender. No hepatosplenomegaly. No signs of ascites. No mass appreciable. MUSCULOSKELETAL/EXTREMITIES: No edema. No cyanotic changes. No signs of joint deformity. No lymphedema. NEUROLOGIC: Cranial nerves II-XII are grossly intact. Sensation intact. Muscle strength and muscle tone symmetrical all through 5/5. BACK: No spinal or paraspinal tenderness. No CVA tenderness.   SKIN: No petechiae. No rash. No signs of cellulitis.   BREASTS: She has retracted nipple on the right side. No palpable lesion.  Left breast exam is negative.    CURRENT LAB DATA REVIEWED  CBC diff is fine, K 3.3, CMP is fine,   Marker is fine    12/2019 right breast 2-3 o'clock in the right breast 2.7 cm from the nipple 0.9 cm nodule US biopsy found IDC grade III, Er/IL-, Her 2 FISH equivocal, IHC pending.     [id number] at 44 in 9/2019, at  48 in 10/2017, at 50 in 10/2016, at 36 in 10/2015, at 46 in 12/2014, 46 in 10/2014  from nl, at 41 in 2011.    CURRENT IMAGE REVIEWED  MA 9/2019- There is a 0.9 cm focal asymmetry at 2-3:00 in the right breast and 2.7 cm from the nipple.    OLD DATA REVIEW IN SUMMARY:   [id number] at 44 in 9/2018, at  48 in 10/2017, at 50 in 10/2016, at 36 in 10/2015, at 46 in 12/2014, 46 in 10/2014  from nl, at 41 in 2011.      ASSESSMENT AND  PLAN:   1. Two-time breast cancer survivor. First time was triple negative breast cancer. The second one was DCIS. She is very worried about recurrence since she has experienced that once already.   We talked extensively about lifestyle modification issues, what things she needs to pay attention to in terms of vitamin D, aspirin intake, exercise and weight control, control alcohol intake, etc. She is very motivated and willing to proceed.   She is follow up.    New abnormal MA on the right side.   12/2019 right breast 2-3 o'clock in the right breast 2.7 cm from the nipple 0.9 cm nodule US biopsy found IDC grade III, ER/LA-, Her 2 negative.  Discussed the multidiscipline care for early stage breast cancer treatment.  Since this is her third breast cancer diagnosis, due to the small size of the lesion, her prior poor tolerance to chemotherapy, upfront surgical resection likely will be a valuable option.  We will refer her to surgery.  Recommend genetic testing actionable panel.     She is under a lot of stress due to the third breast cancer diagnosis in her life the last 16 years, I gave her lots of psychosocial support.    2. Elevation of her tumor marker is fluctuating with unclear etiology.   We discuss the draw back of this test, and what is the proper way to randolph it and ASCO guideline on not doing it.   She is aware of it, yet still wants to do it.   Now looking back, this maybe related to her breast cancer recurrence.     3. Mild hypokalemia.    She is not on diuretics she has some diarrhea from anxiety.  We talked about potassium rich diet    Total time is about 40 minutes, more than 25 minutes spent in counseling regarding her third time breast cancer diagnosis, clinical stage, modality treatment plan,

## 2019-12-10 ENCOUNTER — ONCOLOGY VISIT (OUTPATIENT)
Dept: ONCOLOGY | Facility: CLINIC | Age: 69
End: 2019-12-10
Attending: INTERNAL MEDICINE
Payer: COMMERCIAL

## 2019-12-10 VITALS
DIASTOLIC BLOOD PRESSURE: 87 MMHG | RESPIRATION RATE: 16 BRPM | HEART RATE: 80 BPM | OXYGEN SATURATION: 99 % | BODY MASS INDEX: 26.98 KG/M2 | HEIGHT: 64 IN | WEIGHT: 158 LBS | TEMPERATURE: 98.4 F | SYSTOLIC BLOOD PRESSURE: 155 MMHG

## 2019-12-10 DIAGNOSIS — R97.8 ELEVATED TUMOR MARKERS: ICD-10-CM

## 2019-12-10 DIAGNOSIS — C50.911 RECURRENT BREAST CANCER, RIGHT (H): ICD-10-CM

## 2019-12-10 DIAGNOSIS — Z85.3 PERSONAL HISTORY OF MALIGNANT NEOPLASM OF BREAST: Primary | ICD-10-CM

## 2019-12-10 DIAGNOSIS — E87.6 HYPOKALEMIA: ICD-10-CM

## 2019-12-10 LAB — COPATH REPORT: NORMAL

## 2019-12-10 PROCEDURE — 99215 OFFICE O/P EST HI 40 MIN: CPT | Performed by: INTERNAL MEDICINE

## 2019-12-10 PROCEDURE — G0463 HOSPITAL OUTPT CLINIC VISIT: HCPCS

## 2019-12-10 ASSESSMENT — PAIN SCALES - GENERAL: PAINLEVEL: NO PAIN (1)

## 2019-12-10 ASSESSMENT — MIFFLIN-ST. JEOR: SCORE: 1220.65

## 2019-12-10 NOTE — PATIENT INSTRUCTIONS
Dr. Lerma would like you to meet with a surgeon for breast surgery consult.     We will also be discussing the case on Thursday and call with the plan.     When you are in need of a refill, please call your pharmacy and they will send us a request.      Copy of appointments, and after visit summary (AVS) given to patient.      If you have any questions please call Kindra Reagan RN, BSN Oncology Hematology  [facility] [phone]. For questions after business hours, or on holidays/weekends, please call our after hours Nurse Triage line [phone]. Thank you.       F/u IHC on her 2.   Tumor conference discussion.  Surgical consult.   Likely surgical upfront.

## 2019-12-10 NOTE — NURSING NOTE
"Oncology Rooming Note    December 10, 2019 3:19 PM   Milly Loaiza is a 69 year old female who presents for:    Chief Complaint   Patient presents with     Oncology Clinic Visit     Recheck Personal history of malignant neoplasm of breast, review Pathology      Initial Vitals: BP (!) 155/87   Pulse 80   Temp 98.4  F (36.9  C) (Tympanic)   Resp 16   Ht 1.632 m (5' 4.25\")   Wt 71.7 kg (158 lb)   LMP  (LMP Unknown)   SpO2 99%   BMI 26.91 kg/m   Estimated body mass index is 26.91 kg/m  as calculated from the following:    Height as of this encounter: 1.632 m (5' 4.25\").    Weight as of this encounter: 71.7 kg (158 lb). Body surface area is 1.8 meters squared.  No Pain (1) Comment: Data Unavailable   No LMP recorded (lmp unknown). Patient is postmenopausal.  Allergies reviewed: Yes  Medications reviewed: Yes    Medications: Medication refills not needed today.  Pharmacy name entered into mParticle: [facility] [address]    Clinical concerns: Patient presents today in f/u of Recheck Personal history of malignant neoplasm of breast and to review Pathology.   Blood pressure is elevated. 1st attempt 170/88, second attempt 155/87. Is on lisinopril.  Dr. Lerma was notified.      Kacey Loaiza, RN, BSN, OCN              "

## 2019-12-11 LAB — LAB SCANNED RESULT: NORMAL

## 2019-12-12 ENCOUNTER — TUMOR CONFERENCE (OUTPATIENT)
Dept: ONCOLOGY | Facility: CLINIC | Age: 69
End: 2019-12-12

## 2019-12-12 DIAGNOSIS — C50.911 RECURRENT BREAST CANCER, RIGHT (H): Primary | ICD-10-CM

## 2019-12-12 DIAGNOSIS — Z80.3 FAMILY HISTORY OF MALIGNANT NEOPLASM OF BREAST: ICD-10-CM

## 2019-12-12 DIAGNOSIS — Z85.3 PERSONAL HISTORY OF MALIGNANT NEOPLASM OF BREAST: ICD-10-CM

## 2019-12-12 NOTE — TUMOR CONFERENCE
Tumor Conference Information  Tumor Conference:  [facility]  Specialties Present:  Medical oncology, Pathology, Radiology, Radiation oncology, Surgery  Patient Status:  Recurrence, A current patient  Pathology:  Discussed (see comment) (Comment: Right Invasive Ductal Carcinoma ER/LA-, HER2-)  Treatment to Date:  None  Clinical Trial Eligibility:  None available  Recommended Plan:  Additional testing (see comment), Surgery, Palliative radiation therapy (Comment: Genetics Breast Actionable )  Did the review exceed 30 minutes?:  did not       Breast Actionable panel ordered. Pt will have this drawn next week at [place]. Surgery consult is set for 01.02 with Dr. King. Pt aware pt aware of plan.    Plan for Surgery (pending genetic results). If lumpectomy is decided re-radiation may be a possibility.       Kindra Reagan RN on 12/12/2019 at 4:00 PM    Documentation / Disclaimer Cancer Tumor Board Note  Cancer tumor board recommendations do not override what is determined to be reasonable care and treatment, which is dependent on the circumstances of a patient's case; the patient's medical, social, and personal concerns; and the clinical judgment of the oncologist [physician].

## 2019-12-12 NOTE — PROGRESS NOTES
GENETIC TESTING FOR   BREAST ACTIONABLE test: The Breast Actionable test analyzes 11 genes associated with hereditary breast cancer: MOLLY, BRCA1, BRCA2, CDH1, CHEK2, NBN, NF1, PALB2, PTEN, STK11, TP53. BRCA1 and BRCA2 only account for about half of hereditary breast cancer; therefore, this multi-gene test is the most efficient and cost-effective way to analyze these genes. As hereditary breast cancer is suspected, there is a reasonable probability of detecting a mutation in my patient. All of the genes on this test have published clinical practice guidelines.    Based on her personal history, Milly meets the current National Comprehensive Cancer Network (NCCN) criteria for genetic testing of BRCA1/BRCA2 and/or requires genetic testing based on the recommendation of the American Society of Breast Surgeons Consensus Guideline on Genetic Testing for Hereditary Breast Cancer.       Medical Management:   For Milly, we reviewed that the information from genetic testing may determine:    surgery to treat Milly's active cancer diagnosis    additional cancer screening and/or medical intervention for which Milly may qualify    Additionally, family history was reviewed for any significant cancer history in Milly's first and second degree relatives. Family history is significant for the following: paternal grandmother and personal history of breast cancer    I, a qualified medical provider, counseled the patient on possible test results and its implications in relation to Milly's treatment/management. Milly expressed understanding and consented to proceed with genetic testing. Referral to genetic counseling will be made upon return of results to discuss additional genetic testing options if indicated based on patient s personal and/or family history.

## 2019-12-17 ENCOUNTER — OFFICE VISIT (OUTPATIENT)
Dept: FAMILY MEDICINE | Facility: CLINIC | Age: 69
End: 2019-12-17
Payer: COMMERCIAL

## 2019-12-17 VITALS
SYSTOLIC BLOOD PRESSURE: 132 MMHG | HEIGHT: 64 IN | HEART RATE: 88 BPM | TEMPERATURE: 98.2 F | DIASTOLIC BLOOD PRESSURE: 80 MMHG | BODY MASS INDEX: 26.63 KG/M2 | RESPIRATION RATE: 14 BRPM | WEIGHT: 156 LBS

## 2019-12-17 DIAGNOSIS — C50.911 RECURRENT BREAST CANCER, RIGHT (H): ICD-10-CM

## 2019-12-17 DIAGNOSIS — Z85.3 PERSONAL HISTORY OF MALIGNANT NEOPLASM OF BREAST: ICD-10-CM

## 2019-12-17 DIAGNOSIS — Z80.3 FAMILY HISTORY OF MALIGNANT NEOPLASM OF BREAST: ICD-10-CM

## 2019-12-17 DIAGNOSIS — I10 HYPERTENSION GOAL BP (BLOOD PRESSURE) < 140/90: Primary | ICD-10-CM

## 2019-12-17 LAB
ANION GAP SERPL CALCULATED.3IONS-SCNC: 2 MMOL/L (ref 3–14)
BUN SERPL-MCNC: 11 MG/DL (ref 7–30)
CALCIUM SERPL-MCNC: 9.1 MG/DL (ref 8.5–10.1)
CHLORIDE SERPL-SCNC: 106 MMOL/L (ref 94–109)
CO2 SERPL-SCNC: 29 MMOL/L (ref 20–32)
CREAT SERPL-MCNC: 0.82 MG/DL (ref 0.52–1.04)
GFR SERPL CREATININE-BSD FRML MDRD: 72 ML/MIN/{1.73_M2}
GLUCOSE SERPL-MCNC: 92 MG/DL (ref 70–99)
POTASSIUM SERPL-SCNC: 3.5 MMOL/L (ref 3.4–5.3)
SODIUM SERPL-SCNC: 137 MMOL/L (ref 133–144)

## 2019-12-17 PROCEDURE — 36415 COLL VENOUS BLD VENIPUNCTURE: CPT | Performed by: FAMILY MEDICINE

## 2019-12-17 PROCEDURE — 99214 OFFICE O/P EST MOD 30 MIN: CPT | Performed by: FAMILY MEDICINE

## 2019-12-17 PROCEDURE — 80048 BASIC METABOLIC PNL TOTAL CA: CPT | Performed by: FAMILY MEDICINE

## 2019-12-17 ASSESSMENT — MIFFLIN-ST. JEOR: SCORE: 1211.58

## 2019-12-17 ASSESSMENT — PAIN SCALES - GENERAL: PAINLEVEL: NO PAIN (0)

## 2019-12-17 NOTE — LETTER
December 27, 2019      Milly Loaiza  [address] [facility] [address]            Milly,     Your blood test show that you have excellent kidney function.  Please see enclosed copy.  The genetic testing has not come back yet.     Resulted Orders   Basic metabolic panel   Result Value Ref Range    Sodium 137 133 - 144 mmol/L    Potassium 3.5 3.4 - 5.3 mmol/L    Chloride 106 94 - 109 mmol/L    Carbon Dioxide 29 20 - 32 mmol/L    Anion Gap 2 (L) 3 - 14 mmol/L    Glucose 92 70 - 99 mg/dL      Comment:      Non Fasting    Urea Nitrogen 11 7 - 30 mg/dL    Creatinine 0.82 0.52 - 1.04 mg/dL    GFR Estimate 72 >60 mL/min/[1.73_m2]      Comment:      Non  GFR Calc  Starting 12/18/2018, serum creatinine based estimated GFR (eGFR) will be   calculated using the Chronic Kidney Disease Epidemiology Collaboration   (CKD-EPI) equation.      GFR Estimate If Black 83 >60 mL/min/[1.73_m2]      Comment:       GFR Calc  Starting 12/18/2018, serum creatinine based estimated GFR (eGFR) will be   calculated using the Chronic Kidney Disease Epidemiology Collaboration   (CKD-EPI) equation.      Calcium 9.1 8.5 - 10.1 mg/dL       If you have any questions or concerns, please call the clinic at the number listed above.       Sincerely,        Nhung Navarro MD/ag

## 2019-12-17 NOTE — PATIENT INSTRUCTIONS
*   Your blood pressure is very good.   *   Stay on the lisinopril.   *   Good luck with cancer treatment.   *    Will check potassium level today.   *   Think about a flu shot. I'm serious.

## 2019-12-17 NOTE — PROGRESS NOTES
"Subjective     Milly Loaiza is a 69 year old female who presents to clinic today for the following health issues:    HPI     Hypertension Follow-up  Lisinopril 20mg every day     Do you check your blood pressure regularly outside of the clinic? No     Are you following a low salt diet? No    Are your blood pressures ever more than 140 on the top number (systolic) OR more   than 90 on the bottom number (diastolic), for example 140/90? N/A    BP Readings from Last 6 Encounters:   12/17/19 132/80   12/10/19 (!) 155/87   12/02/19 131/81   11/04/19 (!) 158/98   10/23/19 (!) 160/87   10/09/19 (!) 156/89     - Review results from 12/2/2019 breast biopsy.      How many servings of fruits and vegetables do you eat daily?  2-3    On average, how many sweetened beverages do you drink each day (Examples: soda, juice, sweet tea, etc.  Do NOT count diet or artificially sweetened beverages)?  1    How many days per week do you miss taking your medication? 0      Reviewed and updated as needed this visit by Provider         Review of Systems   ROS COMP: CONSTITUTIONAL:NEGATIVE for fever, chills, change in weight  INTEGUMENTARY/SKIN: NEGATIVE for worrisome rashes, moles or lesions  RESP:NEGATIVE for significant cough or SOB  BREAST: POSITIVE for Hx breast cancer x 3  CV: NEGATIVE for chest pain, palpitations or peripheral edema  MUSCULOSKELETAL: NEGATIVE for significant arthralgias or myalgia  PSYCHIATRIC: NEGATIVE for changes in mood or affect    This document serves as a record of the services and decisions personally performed and made by Nhung Navarro MD. It was created on his behalf by Noel Loaiza, a trained medical scribe. The creation of this document is based the provider's statements to the medical scribe.  Noel Loaiza 2:27 PM December 17, 2019        Objective    /80   Pulse 88   Temp 98.2  F (36.8  C) (Tympanic)   Resp 14   Ht 1.632 m (5' 4.25\")   Wt 70.8 kg (156 lb)   LMP  (LMP Unknown)   BMI 26.57 " kg/m    Body mass index is 26.57 kg/m .  Physical Exam   GENERAL: alert, pleasant and no distress  RESP: lungs clear to auscultation - no rales, rhonchi or wheezes  CV: regular rate and rhythm, normal S1 S2  MS: no gross musculoskeletal defects noted, no edema  SKIN: no suspicious lesions or rashes  PSYCH: mentation appears normal, affect normal/bright    Diagnostic Test Results:    Results for orders placed or performed in visit on 12/17/19   Basic metabolic panel     Status: Abnormal   Result Value Ref Range    Sodium 137 133 - 144 mmol/L    Potassium 3.5 3.4 - 5.3 mmol/L    Chloride 106 94 - 109 mmol/L    Carbon Dioxide 29 20 - 32 mmol/L    Anion Gap 2 (L) 3 - 14 mmol/L    Glucose 92 70 - 99 mg/dL    Urea Nitrogen 11 7 - 30 mg/dL    Creatinine 0.82 0.52 - 1.04 mg/dL    GFR Estimate 72 >60 mL/min/[1.73_m2]    GFR Estimate If Black 83 >60 mL/min/[1.73_m2]    Calcium 9.1 8.5 - 10.1 mg/dL           Assessment & Plan        (I10) Hypertension goal BP (blood pressure) < 140/90  (primary encounter diagnosis)  Comment: Now within guidelines using single drug therapy, ACE inhibitor.  This would be part of secondary prevention.  Acceptable renal function.  Plan: Basic metabolic panel        Continue.  Follow-up in 6 months.    (C50.911) Recurrent breast cancer, right (H)  Comment: This would be the third time she is developed an unrelated breast cancer suggesting some type of , genetic predisposition.  Long discussion with the patient and her spouse regarding this issue.  Plan: [employer] Hold For Preauthorization:        Continue follow-up with oncology.    (Z85.3) Personal history of malignant neoplasm of breast  Comment: See above.  Plan: [employer] Hold For Preauthorization:    (Z80.3) Family history of malignant neoplasm of breast  Comment: See above.      Patient Instructions   *   Your blood pressure is very good.   *   Stay on the lisinopril.   *   Good luck with cancer treatment.   *    Will  check potassium level today.   *   Think about a flu shot. I'm serious.         total time spent with pt. was 25 minutes, 20 minutes of the visit was spent discussing the above issues, including pathophysiology, treatment options, and expected outcomes.       No follow-ups on file.    The information in this document, created by a scribe for me, accurately reflects the services I personally performed and the decisions made by me. I have reviewed and approved this document for accuracy.     Nhung Navarro MD  [facility]

## 2019-12-18 DIAGNOSIS — C50.911 RECURRENT BREAST CANCER, RIGHT (H): ICD-10-CM

## 2019-12-18 DIAGNOSIS — Z85.3 PERSONAL HISTORY OF MALIGNANT NEOPLASM OF BREAST: ICD-10-CM

## 2019-12-18 PROCEDURE — 40000803 ZZHCL STATISTIC DNA ISOL HIGH PURITY: Performed by: FAMILY MEDICINE

## 2019-12-23 LAB — COPATH REPORT: NORMAL

## 2019-12-27 ENCOUNTER — APPOINTMENT (OUTPATIENT)
Dept: LAB | Facility: CLINIC | Age: 69
End: 2019-12-27
Attending: INTERNAL MEDICINE
Payer: COMMERCIAL

## 2019-12-30 DIAGNOSIS — C50.911 RECURRENT BREAST CANCER, RIGHT (H): Primary | ICD-10-CM

## 2019-12-30 DIAGNOSIS — Z80.3 FAMILY HISTORY OF MALIGNANT NEOPLASM OF BREAST: ICD-10-CM

## 2019-12-30 DIAGNOSIS — Z85.3 PERSONAL HISTORY OF MALIGNANT NEOPLASM OF BREAST: ICD-10-CM

## 2019-12-30 PROCEDURE — 81408 MOPATH PROCEDURE LEVEL 9: CPT | Performed by: INTERNAL MEDICINE

## 2019-12-30 PROCEDURE — 81405 MOPATH PROCEDURE LEVEL 6: CPT | Performed by: INTERNAL MEDICINE

## 2019-12-30 PROCEDURE — 81479 UNLISTED MOLECULAR PATHOLOGY: CPT | Performed by: INTERNAL MEDICINE

## 2019-12-30 PROCEDURE — 81406 MOPATH PROCEDURE LEVEL 7: CPT | Mod: 91 | Performed by: INTERNAL MEDICINE

## 2019-12-30 PROCEDURE — 81321 PTEN GENE FULL SEQUENCE: CPT | Performed by: INTERNAL MEDICINE

## 2019-12-30 PROCEDURE — 81162 BRCA1&2 GEN FULL SEQ DUP/DEL: CPT | Performed by: INTERNAL MEDICINE

## 2019-12-30 PROCEDURE — 81323 PTEN GENE DUP/DELET VARIANT: CPT | Performed by: INTERNAL MEDICINE

## 2019-12-30 PROCEDURE — 81406 MOPATH PROCEDURE LEVEL 7: CPT | Performed by: INTERNAL MEDICINE

## 2020-01-02 ENCOUNTER — OFFICE VISIT (OUTPATIENT)
Dept: SURGERY | Facility: CLINIC | Age: 70
End: 2020-01-02
Payer: COMMERCIAL

## 2020-01-02 VITALS
BODY MASS INDEX: 26.63 KG/M2 | HEART RATE: 102 BPM | WEIGHT: 156 LBS | DIASTOLIC BLOOD PRESSURE: 92 MMHG | SYSTOLIC BLOOD PRESSURE: 160 MMHG | RESPIRATION RATE: 16 BRPM | HEIGHT: 64 IN

## 2020-01-02 DIAGNOSIS — C50.911 INVASIVE DUCTAL CARCINOMA OF BREAST, RIGHT (H): Primary | ICD-10-CM

## 2020-01-02 LAB — COPATH REPORT: NORMAL

## 2020-01-02 PROCEDURE — 99204 OFFICE O/P NEW MOD 45 MIN: CPT | Performed by: SURGERY

## 2020-01-02 ASSESSMENT — MIFFLIN-ST. JEOR: SCORE: 1211.58

## 2020-01-02 NOTE — NURSING NOTE
"Chief Complaint   Patient presents with     Consult     Recurrent Breast Ca        Initial BP (!) 161/113 (BP Location: Right arm, Patient Position: Chair, Cuff Size: Adult Regular)   Pulse 102   Resp 16   Ht 1.632 m (5' 4.25\")   Wt 70.8 kg (156 lb)   LMP  (LMP Unknown)   BMI 26.57 kg/m   Estimated body mass index is 26.57 kg/m  as calculated from the following:    Height as of this encounter: 1.632 m (5' 4.25\").    Weight as of this encounter: 70.8 kg (156 lb).  BP completed using cuff size: regular   Medications and allergies reviewed.      Aylin CLEANING CMA     "

## 2020-01-02 NOTE — LETTER
1/2/2020         RE: Milly Loaiza  [address] [facility] [address]        Dear Colleague,    Thank you for referring your patient, Milly Loaiza, to the [facility]. Please see a copy of my visit note below.    Assessment:    Milly Loaiza is seen in consultation for right breast cancer, at the request of Nhung Navarro MD.    She has previous invasive ductal carcinoma 2/2004 treated with lumpectomy, axillary lymph node dissection.  T2N0, ER-, KY-.  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    Milly Loaiza is a 69 year old female with Right breast invasive ductal carcinoma, grade 3, ER +, KY +, [id number] pending measuring 1 cm at the 3:00 and 3 cm from the nipple.    Her genetic testing is negative.    Plan:  Lumpectomy, mastectomy, bilateral mastectomies, sentinel lymph node biopsy with possible axillary node dissection and reconstructive options have been offered and discussed at length. I feel the cosmetic outcome with lumpectomy would be acceptable.    She has previously had lumpectomy, right axillary lymph node dissection for T2N0 invasive ductal carcinoma followed by DCIS in the same breast treated with lumpectomy alone.  I discussed need for radiation if lumpectomy is pursued rather than mastectomy.  She is adamant she would like a lumpectomy.  I will discuss with radiation oncology to see if she is indeed a candidate for lumpectomy and will proceed accordingly.  Genetic testing has resulted and negative.    We have had a detailed discussion regarding the recommended treatment for axillary lymph node metastases for women undergoing lumpectomy followed by radiation and for women undergoing mastectomy.    We have discussed the indication, alternatives, risks and expected recovery.  Specifically, we have discussed local recurrence of cancer, risk of future second primary breast cancer, incisions, scarring, breast deformity, volume loss, possible need  for axillary lymphadenectomy, postoperative infection, anesthesia, bleeding, blood transfusion, DVT, PE, postoperative restrictions and physical limitations.  We have discussed the recommended interventions and treatments for these outcomes.  All questions have been answered to the best of my ability.    Breast MRI:  no  Oncology consult:  yes-done  Plastic surgery consult:  no  Radiation oncology consult:  yes-will send message to see if possible to do radiation    Tentatively elects for right lumpectomy in setting of previous right axillary dissection pending discussion with radiation oncology    Recommended time off work postop:  Not Applicable wks      HPI:  Milly Loaiza is a 69 year old female with a Right breast cancer.  This was found on routine mammography.    Skin rashes, dimpling or nipple changes:  right  Nipple discharge:  none  Breast pain:   No  Perform self breast exams: Yes  Previous cyst aspiration: none  Previous other breast surgery: Lumpectomy twice with right axillary lymphadenectomy    Hormonal history:  Menarche age 13, menopausal at age 58, No HRT, no fertility treatment.  1 children, 1st at age 17.    Family History:  Family history of breast cancer: Yes - paternal grandmother  Family history of ovarian cancer:  No  Family history of colon cancer: No  Family history of prostate cancer: No    Imaging:   All imaging studies reviewed by me.    Recent Results (from the past 744 hour(s))   US Breast Biopsy Core Needle Right    Addendum: 12/5/2019    Milly Loaiza  Accession # [id number], [id number]    The original report on this patient was dictated by Dr. Mathur. The  pathology results have returned and show invasive ductal carcinoma  without DCIS. These pathology results are concordant with imaging  findings. Dr. Lerma's office has been notified of these results. They  will make sure the patient is contacted and set up for further workup.    Yuri Mathur MD  ( Date of Addendum: 12/5/2019  )      YURI SWEENEY MD      Narrative    HISTORY: Right breast lesion at 3:00.    COMPARISON: 10/18/2019.    ULTRASOUND-GUIDED NEEDLE CORE BIOPSY OF RIGHT BREAST LESION: Risks and  benefits of the procedure are discussed with the patient. Sonographic  guidance and 10 mL of 1% lidocaine (local anesthetic) are utilized.  Four 14-gauge core biopsy samples are obtained of the right breast  lesion at the 3 o'clock position. The patient tolerated the procedure  well.  There is no significant blood loss.    After the biopsy a hydrophilic marker is placed in case any further  future intervention is necessary. Before this is done risks and  benefits are discussed with the patient.    POSTPROCEDURE MAMMOGRAMS FOR MARKER PLACEMENT: The hydrophilic marker  has appropriately deployed.      Impression    IMPRESSION: Technically uneventful ultrasound-guided needle core  biopsy of breast lesion. Pathologic results are pending.    YURI SWEENEY MD   MA Post Procedure Right    Addendum: 12/5/2019    Milly Loaiza  Accession # [id number], [id number]    The original report on this patient was dictated by Dr. Sweeney. The  pathology results have returned and show invasive ductal carcinoma  without DCIS. These pathology results are concordant with imaging  findings. Dr. Lerma's office has been notified of these results. They  will make sure the patient is contacted and set up for further workup.    Yuri Sweeney MD  ( Date of Addendum: 12/5/2019 )      YURI SWEENEY MD      Narrative    HISTORY: Right breast lesion at 3:00.    COMPARISON: 10/18/2019.    ULTRASOUND-GUIDED NEEDLE CORE BIOPSY OF RIGHT BREAST LESION: Risks and  benefits of the procedure are discussed with the patient. Sonographic  guidance and 10 mL of 1% lidocaine (local anesthetic) are utilized.  Four 14-gauge core biopsy samples are obtained of the right breast  lesion at the 3 o'clock position. The patient tolerated the procedure  well.  There is no significant blood  loss.    After the biopsy a hydrophilic marker is placed in case any further  future intervention is necessary. Before this is done risks and  benefits are discussed with the patient.    POSTPROCEDURE MAMMOGRAMS FOR MARKER PLACEMENT: The hydrophilic marker  has appropriately deployed.      Impression    IMPRESSION: Technically uneventful ultrasound-guided needle core  biopsy of breast lesion. Pathologic results are pending.    ELIZABETH SWEENEY MD       Percutaneous core needle biopsy:     Breast, right, ultrasound-guided needle core biopsy:   - Invasive ductal carcinoma:        - Shelby grade: III/III ; Shelby score: 8/9 (tubules:3;   nuclear:3; mitosis:2)        - Angiolymphatic invasion not identified in the planes examined.        - Ductal carcinoma in-situ not identified in the planes examined.        - ER: Negative (no staining in tumor nuclei)        - MN: negative (no staining in tumor nuclei)       Past Medical History:   has a past medical history of Malignant neoplasm of breast (female), unspecified site.    Past Surgical History:  Past Surgical History:   Procedure Laterality Date     SURGICAL HISTORY OF -   2003    Cervical biopsy     SURGICAL HISTORY OF -   1979    Tubal ligation     SURGICAL HISTORY OF -   2/2/2004    Right mastectomy     SURGICAL HISTORY OF -   3-23-09    Needle-guided left breast biopsy.        Social History:  Social History     Socioeconomic History     Marital status:      Spouse name: Not on file     Number of children: Not on file     Years of education: Not on file     Highest education level: Not on file   Occupational History     Not on file   Social Needs     Financial resource strain: Not on file     Food insecurity:     Worry: Not on file     Inability: Not on file     Transportation needs:     Medical: Not on file     Non-medical: Not on file   Tobacco Use     Smoking status: Never Smoker     Smokeless tobacco: Never Used   Substance and Sexual Activity      "Alcohol use: Yes     Comment: occ.     Drug use: No     Sexual activity: Yes     Partners: Male     Birth control/protection: Surgical   Lifestyle     Physical activity:     Days per week: Not on file     Minutes per session: Not on file     Stress: Not on file   Relationships     Social connections:     Talks on phone: Not on file     Gets together: Not on file     Attends Scientologist service: Not on file     Active member of club or organization: Not on file     Attends meetings of clubs or organizations: Not on file     Relationship status: Not on file     Intimate partner violence:     Fear of current or ex partner: Not on file     Emotionally abused: Not on file     Physically abused: Not on file     Forced sexual activity: Not on file   Other Topics Concern     Parent/sibling w/ CABG, MI or angioplasty before 65F 55M? Not Asked   Social History Narrative     Not on file        ROS:  The 10 point review of systems is negative other than noted in the HPI and above.    PE:  Vitals: BP (!) 160/92   Pulse 102   Resp 16   Ht 1.632 m (5' 4.25\")   Wt 70.8 kg (156 lb)   LMP  (LMP Unknown)   BMI 26.57 kg/m     General appearance: well-nourished, sitting comfortably, no apparent distress  HEENT:  Head normocephalic and atraumatic, pupils equal and round, conjunctivae clear, mucous membranes moist, external ears and nose normal  Neck: Supple without thyromegaly or lymphadenopathy  Lungs: Respirations unlabored  Lymphatic: No cervical, or supraclavicular lymphadenopathy  Extremities: Without edema  Musculoskeletal:  Normal station and gait  Neurologic: alert, speech is clear, moves all extremities with good strength  Psychiatric: Mood and affect are appropriate  Skin: Without lesions or rashes  Breast: (examined with Aylin Newman MA)   Right nipple is inverted, free of discharge.   Left nipple everted, free of discharge     Contour is normal.   Parenchyma is heterogeneously dense.   Masses- none   Post-surgical changes " noted at 12 o'clock 3 cm from nipple consistent with previous lumpectomy   Ecchymosis- none   Incisional scar- right, central, just superior to nipple, transverse    Lymph:       No supraclavicular/infraclavicular adenopathy.   Axillary adenopathy: none; previous right axillary dissection incision noted      This note was created using voice recognition software. Undetected word substitutions or other errors may have occurred.     Time spent with the patient with greater that 50% of the time in discussion was 50 minutes.     Gallo King, DO      Please route or send letter to:  Primary Care Provider (PCP) and Dr. Florentin Baez, thank you for allowing me to participate in the care of your patient.        Sincerely,        Gallo King, DO

## 2020-01-02 NOTE — PROGRESS NOTES
Assessment:    Milly Loaiza is seen in consultation for right breast cancer, at the request of Nhung Navarro MD.    She has previous invasive ductal carcinoma 2/2004 treated with lumpectomy, axillary lymph node dissection.  T2N0, ER-, PA-.  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    Milly Loaiza is a 69 year old female with Right breast invasive ductal carcinoma, grade 3, ER +, PA +, [id number] pending measuring 1 cm at the 3:00 and 3 cm from the nipple.    Her genetic testing is negative.    Plan:  Lumpectomy, mastectomy, bilateral mastectomies, sentinel lymph node biopsy with possible axillary node dissection and reconstructive options have been offered and discussed at length. I feel the cosmetic outcome with lumpectomy would be acceptable.    She has previously had lumpectomy, right axillary lymph node dissection for T2N0 invasive ductal carcinoma followed by DCIS in the same breast treated with lumpectomy alone.  I discussed need for radiation if lumpectomy is pursued rather than mastectomy.  She is adamant she would like a lumpectomy.  I will discuss with radiation oncology to see if she is indeed a candidate for lumpectomy and will proceed accordingly.  Genetic testing has resulted and negative.    We have had a detailed discussion regarding the recommended treatment for axillary lymph node metastases for women undergoing lumpectomy followed by radiation and for women undergoing mastectomy.    We have discussed the indication, alternatives, risks and expected recovery.  Specifically, we have discussed local recurrence of cancer, risk of future second primary breast cancer, incisions, scarring, breast deformity, volume loss, possible need for axillary lymphadenectomy, postoperative infection, anesthesia, bleeding, blood transfusion, DVT, PE, postoperative restrictions and physical limitations.  We have discussed the recommended interventions and treatments for these outcomes.   All questions have been answered to the best of my ability.    Breast MRI:  no  Oncology consult:  yes-done  Plastic surgery consult:  no  Radiation oncology consult:  yes-will send message to see if possible to do radiation    Tentatively elects for right lumpectomy in setting of previous right axillary dissection pending discussion with radiation oncology    Recommended time off work postop:  Not Applicable wks      HPI:  Milly Loaiza is a 69 year old female with a Right breast cancer.  This was found on routine mammography.    Skin rashes, dimpling or nipple changes:  right  Nipple discharge:  none  Breast pain:   No  Perform self breast exams: Yes  Previous cyst aspiration: none  Previous other breast surgery: Lumpectomy twice with right axillary lymphadenectomy    Hormonal history:  Menarche age 13, menopausal at age 58, No HRT, no fertility treatment.  1 children, 1st at age 17.    Family History:  Family history of breast cancer: Yes - paternal grandmother  Family history of ovarian cancer:  No  Family history of colon cancer: No  Family history of prostate cancer: No    Imaging:   All imaging studies reviewed by me.    Recent Results (from the past 744 hour(s))   US Breast Biopsy Core Needle Right    Addendum: 12/5/2019    Milly Loaiza  Accession # [id number], [id number]    The original report on this patient was dictated by Dr. Mathur. The  pathology results have returned and show invasive ductal carcinoma  without DCIS. These pathology results are concordant with imaging  findings. Dr. Lerma's office has been notified of these results. They  will make sure the patient is contacted and set up for further workup.    Elizabeth Mathur MD  ( Date of Addendum: 12/5/2019 )      ELIZABETH MATHUR MD      Narrative    HISTORY: Right breast lesion at 3:00.    COMPARISON: 10/18/2019.    ULTRASOUND-GUIDED NEEDLE CORE BIOPSY OF RIGHT BREAST LESION: Risks and  benefits of the procedure are discussed with the patient.  Sonographic  guidance and 10 mL of 1% lidocaine (local anesthetic) are utilized.  Four 14-gauge core biopsy samples are obtained of the right breast  lesion at the 3 o'clock position. The patient tolerated the procedure  well.  There is no significant blood loss.    After the biopsy a hydrophilic marker is placed in case any further  future intervention is necessary. Before this is done risks and  benefits are discussed with the patient.    POSTPROCEDURE MAMMOGRAMS FOR MARKER PLACEMENT: The hydrophilic marker  has appropriately deployed.      Impression    IMPRESSION: Technically uneventful ultrasound-guided needle core  biopsy of breast lesion. Pathologic results are pending.    YURI SWEENEY MD   MA Post Procedure Right    Addendum: 12/5/2019    Milly Loaiza  Accession # [id number], [id number]    The original report on this patient was dictated by Dr. Sweeney. The  pathology results have returned and show invasive ductal carcinoma  without DCIS. These pathology results are concordant with imaging  findings. Dr. Lerma's office has been notified of these results. They  will make sure the patient is contacted and set up for further workup.    Yuri Sweeney MD  ( Date of Addendum: 12/5/2019 )      YRUI SWEENEY MD      Narrative    HISTORY: Right breast lesion at 3:00.    COMPARISON: 10/18/2019.    ULTRASOUND-GUIDED NEEDLE CORE BIOPSY OF RIGHT BREAST LESION: Risks and  benefits of the procedure are discussed with the patient. Sonographic  guidance and 10 mL of 1% lidocaine (local anesthetic) are utilized.  Four 14-gauge core biopsy samples are obtained of the right breast  lesion at the 3 o'clock position. The patient tolerated the procedure  well.  There is no significant blood loss.    After the biopsy a hydrophilic marker is placed in case any further  future intervention is necessary. Before this is done risks and  benefits are discussed with the patient.    POSTPROCEDURE MAMMOGRAMS FOR MARKER PLACEMENT: The hydrophilic  marker  has appropriately deployed.      Impression    IMPRESSION: Technically uneventful ultrasound-guided needle core  biopsy of breast lesion. Pathologic results are pending.    ELIZABETH SWEENEY MD       Percutaneous core needle biopsy:     Breast, right, ultrasound-guided needle core biopsy:   - Invasive ductal carcinoma:        - Homestead grade: III/III ; Homestead score: 8/9 (tubules:3;   nuclear:3; mitosis:2)        - Angiolymphatic invasion not identified in the planes examined.        - Ductal carcinoma in-situ not identified in the planes examined.        - ER: Negative (no staining in tumor nuclei)        - WI: negative (no staining in tumor nuclei)       Past Medical History:   has a past medical history of Malignant neoplasm of breast (female), unspecified site.    Past Surgical History:  Past Surgical History:   Procedure Laterality Date     SURGICAL HISTORY OF -   2003    Cervical biopsy     SURGICAL HISTORY OF -   1979    Tubal ligation     SURGICAL HISTORY OF -   2/2/2004    Right mastectomy     SURGICAL HISTORY OF -   3-23-09    Needle-guided left breast biopsy.        Social History:  Social History     Socioeconomic History     Marital status:      Spouse name: Not on file     Number of children: Not on file     Years of education: Not on file     Highest education level: Not on file   Occupational History     Not on file   Social Needs     Financial resource strain: Not on file     Food insecurity:     Worry: Not on file     Inability: Not on file     Transportation needs:     Medical: Not on file     Non-medical: Not on file   Tobacco Use     Smoking status: Never Smoker     Smokeless tobacco: Never Used   Substance and Sexual Activity     Alcohol use: Yes     Comment: occ.     Drug use: No     Sexual activity: Yes     Partners: Male     Birth control/protection: Surgical   Lifestyle     Physical activity:     Days per week: Not on file     Minutes per session: Not on file     Stress: Not  "on file   Relationships     Social connections:     Talks on phone: Not on file     Gets together: Not on file     Attends Hoahaoism service: Not on file     Active member of club or organization: Not on file     Attends meetings of clubs or organizations: Not on file     Relationship status: Not on file     Intimate partner violence:     Fear of current or ex partner: Not on file     Emotionally abused: Not on file     Physically abused: Not on file     Forced sexual activity: Not on file   Other Topics Concern     Parent/sibling w/ CABG, MI or angioplasty before 65F 55M? Not Asked   Social History Narrative     Not on file        ROS:  The 10 point review of systems is negative other than noted in the HPI and above.    PE:  Vitals: BP (!) 160/92   Pulse 102   Resp 16   Ht 1.632 m (5' 4.25\")   Wt 70.8 kg (156 lb)   LMP  (LMP Unknown)   BMI 26.57 kg/m    General appearance: well-nourished, sitting comfortably, no apparent distress  HEENT:  Head normocephalic and atraumatic, pupils equal and round, conjunctivae clear, mucous membranes moist, external ears and nose normal  Neck: Supple without thyromegaly or lymphadenopathy  Lungs: Respirations unlabored  Lymphatic: No cervical, or supraclavicular lymphadenopathy  Extremities: Without edema  Musculoskeletal:  Normal station and gait  Neurologic: alert, speech is clear, moves all extremities with good strength  Psychiatric: Mood and affect are appropriate  Skin: Without lesions or rashes  Breast: (examined with Aylin Newman MA)   Right nipple is inverted, free of discharge.   Left nipple everted, free of discharge     Contour is normal.   Parenchyma is heterogeneously dense.   Masses- none   Post-surgical changes noted at 12 o'clock 3 cm from nipple consistent with previous lumpectomy   Ecchymosis- none   Incisional scar- right, central, just superior to nipple, transverse    Lymph:       No supraclavicular/infraclavicular adenopathy.   Axillary adenopathy: none; " previous right axillary dissection incision noted      This note was created using voice recognition software. Undetected word substitutions or other errors may have occurred.     Time spent with the patient with greater that 50% of the time in discussion was 50 minutes.     Gallo King, DO      Please route or send letter to:  Primary Care Provider (PCP) and Dr. Lerma

## 2020-01-07 PROBLEM — C50.911 INVASIVE DUCTAL CARCINOMA OF BREAST, RIGHT (H): Status: ACTIVE | Noted: 2020-01-07

## 2020-01-10 NOTE — H&P (VIEW-ONLY)
[facility]  [address] [address]  [phone]  Dept: [phone]    PRE-OP EVALUATION:  Today's date: 2020    Milly Loaiza (: 1950) presents for pre-operative evaluation assessment as requested by Dr. King.  She requires evaluation and anesthesia risk assessment prior to undergoing surgery/procedure for treatment of right breast.    Proposed Surgery/ Procedure: Right Wire Localized Breast Lumpectomy  Date of Surgery/ Procedure: 2020  Time of Surgery/ Procedure: 9:30AM  Hospital/Surgical Facility: [facility]  Primary Physician: Nhung Navarro  Type of Anesthesia Anticipated: General    Patient has a Health Care Directive or Living Will:  NO    1. NO - Do you have a history of heart attack, stroke, stent, bypass or surgery on an artery in the head, neck, heart or legs?  2. NO - Do you ever have any pain or discomfort in your chest?  3. NO - Do you have a history of  Heart Failure?  4. NO - Are you troubled by shortness of breath when: walking on the level, up a slight hill or at night?  5. NO - Do you currently have a cold, bronchitis or other respiratory infection?  6. NO - Do you have a cough, shortness of breath or wheezing?  7. NO - Do you sometimes get pains in the calves of your legs when you walk?  8. NO - Do you or anyone in your family have previous history of blood clots?  9. NO - Do you or does anyone in your family have a serious bleeding problem such as prolonged bleeding following surgeries or cuts?  10. NO - Have you ever had problems with anemia or been told to take iron pills?  11. NO - Have you had any abnormal blood loss such as black, tarry or bloody stools, or abnormal vaginal bleeding?  12. NO - Have you ever had a blood transfusion?  13. NO - Have you or any of your relatives ever had problems with anesthesia?  14. NO - Do you have sleep apnea, excessive snoring or daytime drowsiness?  15. NO - Do you have any prosthetic heart  valves?  16. NO - Do you have prosthetic joints?  17. NO - Is there any chance that you may be pregnant?      HPI:     HPI related to upcoming procedure: Patient presents for evaluation of recurrent breast cancer.     She has previous invasive ductal carcinoma 2/2004 treated with lumpectomy, axillary lymph node dissection.  T2N0, ER-, DC-.  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     Milly Loaiza is a 69 year old female with Right breast invasive ductal carcinoma, grade 3, ER +, DC +, [id number] pending measuring 1 cm at the 3:00 and 3 cm from the nipple.     Her genetic testing is negative.      See problem list for active medical problems.  Problems all longstanding and stable, except as noted/documented.  See ROS for pertinent symptoms related to these conditions.      MEDICAL HISTORY:     Patient Active Problem List    Diagnosis Date Noted     Invasive ductal carcinoma of breast, right (H) 01/07/2020     Priority: Medium     Added automatically from request for surgery [id number]       Hypertension goal BP (blood pressure) < 140/90 10/09/2019     Priority: Medium     Acute reaction to stress 10/09/2019     Priority: Medium     Lacunar infarction (H) 03/31/2019     Priority: Medium     Advanced directives, counseling/discussion 04/05/2017     Priority: Medium     Advance Care Planning 4/5/2017: Information declined             TIA (transient ischemic attack) 03/29/2017     Priority: Medium     CARDIOVASCULAR SCREENING; LDL GOAL LESS THAN 160 10/31/2010     Priority: Medium     DCIS (ductal carcinoma in situ) 03/27/2009     Priority: Medium     Malignant neoplasm of female breast (H) 05/27/2005     Priority: Medium     Problem list name updated by automated process. Provider to review        Past Medical History:   Diagnosis Date     Malignant neoplasm of breast (female), unspecified site      Past Surgical History:   Procedure Laterality Date     SURGICAL HISTORY OF -   2003    Cervical  biopsy     SURGICAL HISTORY OF -   1979    Tubal ligation     SURGICAL HISTORY OF -   2/2/2004    Right mastectomy     SURGICAL HISTORY OF -   3-23-09    Needle-guided left breast biopsy.     Current Outpatient Medications   Medication Sig Dispense Refill     aspirin 325 MG tablet Take 1 tablet (325 mg) by mouth daily 120 tablet 3     calcium carbonate (OS-LUCIAN 500 MG [place]. [place]) 500 MG tablet Take 1,000 mg by mouth daily       DAILY MULTI VITAMIN/MINERALS OR Take 1 tablet by mouth every evening        lisinopril (PRINIVIL/ZESTRIL) 20 MG tablet Take 1 tablet (20 mg) by mouth daily For blood pressure. 90 tablet 1     acetaminophen (TYLENOL) 325 MG tablet Take 325 mg by mouth every 6 hours as needed for mild pain       OTC products: Aspirin    Allergies   Allergen Reactions     Coconut Fatty Acids Itching and Rash     Purple blotches     Sulfa Drugs Rash     Purple blotches      Latex Allergy: NO    Social History     Tobacco Use     Smoking status: Never Smoker     Smokeless tobacco: Never Used   Substance Use Topics     Alcohol use: Yes     Comment: occ.     History   Drug Use No       REVIEW OF SYSTEMS:   CONSTITUTIONAL: NEGATIVE for fever, chills, change in weight  INTEGUMENTARY/SKIN: NEGATIVE for worrisome rashes, moles or lesions  EYES: NEGATIVE for vision changes or irritation  ENT/MOUTH: NEGATIVE for ear, mouth and throat problems  RESP: NEGATIVE for significant cough or SOB  BREAST: POSITIVE for mass right breast  CV: NEGATIVE for chest pain, palpitations or peripheral edema  GI: NEGATIVE for nausea, abdominal pain, heartburn, or change in bowel habits  : NEGATIVE for frequency, dysuria, or hematuria  MUSCULOSKELETAL: NEGATIVE for significant arthralgias or myalgia  NEURO: NEGATIVE for weakness, dizziness or paresthesias  ENDOCRINE: NEGATIVE for temperature intolerance, skin/hair changes  HEME: NEGATIVE for bleeding problems  PSYCHIATRIC: NEGATIVE for changes in mood or affect    This document serves as a  record of the services and decisions personally performed and made by Nhung Navarro MD. It was created on his behalf by Noel Loaiza, a trained medical scribe. The creation of this document is based the provider's statements to the medical scribe.  Noel Loaiza 2:41 PM January 14, 2020    EXAM:   /88   Pulse 66   Temp 98.5  F (36.9  C) (Tympanic)   Resp 16   Wt 72.6 kg (160 lb 2 oz)   LMP  (LMP Unknown)   BMI 27.27 kg/m      GENERAL APPEARANCE: alert, pleasant and no distress     EYES: EOMI, PERRL     HENT: ear canals and TM's normal and nose and mouth without ulcers or lesions     NECK: no adenopathy, no asymmetry, masses, or scars and thyroid normal to palpation     RESP: lungs clear to auscultation - no rales, rhonchi or wheezes     CV: regular rates and rhythm, normal S1 S2     ABDOMEN:  soft, nontender     MS: extremities normal- no gross deformities noted.     SKIN: no suspicious lesions or rashes     NEURO: Normal strength and tone, sensory exam grossly normal, mentation intact and speech normal     PSYCH: mentation appears normal. and affect normal/bright     LYMPHATICS: No cervical adenopathy    DIAGNOSTICS:   EKG:  last ekg on 10/23/2019 - NSR     Lab Results:   Component Latest Ref Rng & Units 12/17/2019   Sodium 133 - 144 mmol/L 137   Potassium 3.4 - 5.3 mmol/L 3.5   Chloride 94 - 109 mmol/L 106   Carbon Dioxide 20 - 32 mmol/L 29   Anion Gap 3 - 14 mmol/L 2 (L)   Glucose 70 - 99 mg/dL 92   Urea Nitrogen 7 - 30 mg/dL 11   Creatinine 0.52 - 1.04 mg/dL 0.82   GFR Estimate >60 mL/min/1.73:m2 72   GFR Estimate If Black >60 mL/min/1.73:m2 83   Calcium 8.5 - 10.1 mg/dL 9.1     Component Latest Ref Rng & Units 10/23/2019   WBC 4.0 - 11.0 10e9/L 8.5   RBC Count 3.8 - 5.2 10e12/L 4.80   Hemoglobin 11.7 - 15.7 g/dL 14.0   Hematocrit 35.0 - 47.0 % 42.2   MCV 78 - 100 fl 88   MCH 26.5 - 33.0 pg 29.2   MCHC 31.5 - 36.5 g/dL 33.2   RDW 10.0 - 15.0 % 12.6   Platelet Count 150 - 450 10e9/L 278   Diff Method  Automated Method   % Neutrophils    74.0   % Lymphocytes    18.8   % Monocytes 5.3   % Eosinophils 0.7   % Basophils     0.8   % Immature Granulocytes     0.4   Nucleated RBCs 0 /100 0   Absolute Neutrophil 1.6 - 8.3 10e9/L 6.3   Absolute Lymphocytes 0.8 - 5.3 10e9/L 1.6   Absolute Monocytes 0.0 - 1.3 10e9/L 0.5   Absolute Eosinophils 0.0 - 0.7 10e9/L 0.1   Absolute Basophils 0.0 - 0.2 10e9/L 0.1   Abs Immature Granulocytes 0 - 0.4 10e9/L 0.0   Absolute Nucleated RBC       0.0     IMPRESSION:     The proposed surgical procedure is considered LOW risk.    REVISED CARDIAC RISK INDEX  The patient has the following serious cardiovascular risks for perioperative complications such as (MI, PE, VFib and 3  AV Block):  No serious cardiac risks  INTERPRETATION: 1 risks: Class II (low risk - 0.9% complication rate)    The patient has the following additional risks for perioperative complications:  No identified additional risks    (Z01.818) Preop general physical exam  (primary encounter diagnosis)  Comment: No contraindications.  Plan: CANCELED: Basic metabolic panel  (Ca, Cl, CO2,         Creat, Gluc, K, Na, BUN), CANCELED: CBC with         platelets        (C50.911) Invasive ductal carcinoma of breast, right (H)  Comment: Somewhat unusual presentation, this appears to be her third independent presentation of breast cancer.  She does not seem to be a genetic link.  Plan: Patient is quite adamant she wishes to pursue lumpectomy and radiation therapy.    (I10) Hypertension goal BP (blood pressure) < 140/90  Comment: Within guidelines using single drug therapy.  Plan: Continue current medications.    (G45.9) TIA (transient ischemic attack)  Comment: No recurrence.  Treatment includes management of blood pressure and high-dose antiplatelet therapy.  Plan: Continue.  Stopping her aspirin would be acceptable for surgical procedure.      RECOMMENDATIONS:     --Patient is to take all scheduled medications on the day of surgery  EXCEPT for modifications listed below.    Anticoagulant or Antiplatelet Medication Use  ASPIRIN: Discontinue ASA 7-10 days prior to procedure to reduce bleeding risk.  It should be resumed post-operatively.        ACE Inhibitor or Angiotensin Receptor Blocker (ARB) Use  Ace inhibitor or Angiotensin Receptor Blocker (ARB) and will continue this medication due to the higher risk of uncontrolled perioperative hypertension.  Patient has a history of marked increased blood pressure in the settings of anxiety.      APPROVAL GIVEN to proceed with proposed procedure, without further diagnostic evaluation     The information in this document, created by a scribe for me, accurately reflects the services I personally performed and the decisions made by me. I have reviewed and approved this document for accuracy.     Signed Electronically by: Nhung Navarro MD    Copy of this evaluation report is provided to requesting physician.    [facility] Preop Guidelines    Revised Cardiac Risk Index

## 2020-01-10 NOTE — PROGRESS NOTES
[facility]  [address] [address]  [phone]  Dept: [phone]    PRE-OP EVALUATION:  Today's date: 2020    Milly Loaiza (: 1950) presents for pre-operative evaluation assessment as requested by Dr. King.  She requires evaluation and anesthesia risk assessment prior to undergoing surgery/procedure for treatment of right breast.    Proposed Surgery/ Procedure: Right Wire Localized Breast Lumpectomy  Date of Surgery/ Procedure: 2020  Time of Surgery/ Procedure: 9:30AM  Hospital/Surgical Facility: [facility]  Primary Physician: Nhung Navarro  Type of Anesthesia Anticipated: General    Patient has a Health Care Directive or Living Will:  NO    1. NO - Do you have a history of heart attack, stroke, stent, bypass or surgery on an artery in the head, neck, heart or legs?  2. NO - Do you ever have any pain or discomfort in your chest?  3. NO - Do you have a history of  Heart Failure?  4. NO - Are you troubled by shortness of breath when: walking on the level, up a slight hill or at night?  5. NO - Do you currently have a cold, bronchitis or other respiratory infection?  6. NO - Do you have a cough, shortness of breath or wheezing?  7. NO - Do you sometimes get pains in the calves of your legs when you walk?  8. NO - Do you or anyone in your family have previous history of blood clots?  9. NO - Do you or does anyone in your family have a serious bleeding problem such as prolonged bleeding following surgeries or cuts?  10. NO - Have you ever had problems with anemia or been told to take iron pills?  11. NO - Have you had any abnormal blood loss such as black, tarry or bloody stools, or abnormal vaginal bleeding?  12. NO - Have you ever had a blood transfusion?  13. NO - Have you or any of your relatives ever had problems with anesthesia?  14. NO - Do you have sleep apnea, excessive snoring or daytime drowsiness?  15. NO - Do you have any prosthetic heart  valves?  16. NO - Do you have prosthetic joints?  17. NO - Is there any chance that you may be pregnant?      HPI:     HPI related to upcoming procedure: Patient presents for evaluation of recurrent breast cancer.     She has previous invasive ductal carcinoma 2/2004 treated with lumpectomy, axillary lymph node dissection.  T2N0, ER-, ME-.  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     Milly Loaiza is a 69 year old female with Right breast invasive ductal carcinoma, grade 3, ER +, ME +, Zhy0nos pending measuring 1 cm at the 3:00 and 3 cm from the nipple.     Her genetic testing is negative.      See problem list for active medical problems.  Problems all longstanding and stable, except as noted/documented.  See ROS for pertinent symptoms related to these conditions.      MEDICAL HISTORY:     Patient Active Problem List    Diagnosis Date Noted     Invasive ductal carcinoma of breast, right (H) 01/07/2020     Priority: Medium     Added automatically from request for surgery [id number]       Hypertension goal BP (blood pressure) < 140/90 10/09/2019     Priority: Medium     Acute reaction to stress 10/09/2019     Priority: Medium     Lacunar infarction (H) 03/31/2019     Priority: Medium     Advanced directives, counseling/discussion 04/05/2017     Priority: Medium     Advance Care Planning 4/5/2017: Information declined             TIA (transient ischemic attack) 03/29/2017     Priority: Medium     CARDIOVASCULAR SCREENING; LDL GOAL LESS THAN 160 10/31/2010     Priority: Medium     DCIS (ductal carcinoma in situ) 03/27/2009     Priority: Medium     Malignant neoplasm of female breast (H) 05/27/2005     Priority: Medium     Problem list name updated by automated process. Provider to review        Past Medical History:   Diagnosis Date     Malignant neoplasm of breast (female), unspecified site      Past Surgical History:   Procedure Laterality Date     SURGICAL HISTORY OF -   2003    Cervical  biopsy     SURGICAL HISTORY OF -   1979    Tubal ligation     SURGICAL HISTORY OF -   2/2/2004    Right mastectomy     SURGICAL HISTORY OF -   3-23-09    Needle-guided left breast biopsy.     Current Outpatient Medications   Medication Sig Dispense Refill     aspirin 325 MG tablet Take 1 tablet (325 mg) by mouth daily 120 tablet 3     calcium carbonate (OS-LUCIAN 500 MG [place]. [place]) 500 MG tablet Take 1,000 mg by mouth daily       DAILY MULTI VITAMIN/MINERALS OR Take 1 tablet by mouth every evening        lisinopril (PRINIVIL/ZESTRIL) 20 MG tablet Take 1 tablet (20 mg) by mouth daily For blood pressure. 90 tablet 1     acetaminophen (TYLENOL) 325 MG tablet Take 325 mg by mouth every 6 hours as needed for mild pain       OTC products: Aspirin    Allergies   Allergen Reactions     Coconut Fatty Acids Itching and Rash     Purple blotches     Sulfa Drugs Rash     Purple blotches      Latex Allergy: NO    Social History     Tobacco Use     Smoking status: Never Smoker     Smokeless tobacco: Never Used   Substance Use Topics     Alcohol use: Yes     Comment: occ.     History   Drug Use No       REVIEW OF SYSTEMS:   CONSTITUTIONAL: NEGATIVE for fever, chills, change in weight  INTEGUMENTARY/SKIN: NEGATIVE for worrisome rashes, moles or lesions  EYES: NEGATIVE for vision changes or irritation  ENT/MOUTH: NEGATIVE for ear, mouth and throat problems  RESP: NEGATIVE for significant cough or SOB  BREAST: POSITIVE for mass right breast  CV: NEGATIVE for chest pain, palpitations or peripheral edema  GI: NEGATIVE for nausea, abdominal pain, heartburn, or change in bowel habits  : NEGATIVE for frequency, dysuria, or hematuria  MUSCULOSKELETAL: NEGATIVE for significant arthralgias or myalgia  NEURO: NEGATIVE for weakness, dizziness or paresthesias  ENDOCRINE: NEGATIVE for temperature intolerance, skin/hair changes  HEME: NEGATIVE for bleeding problems  PSYCHIATRIC: NEGATIVE for changes in mood or affect    This document serves as a  record of the services and decisions personally performed and made by Nhung Navarro MD. It was created on his behalf by Noel Loaiza, a trained medical scribe. The creation of this document is based the provider's statements to the medical scribe.  Noel Loaiza 2:41 PM January 14, 2020    EXAM:   /88   Pulse 66   Temp 98.5  F (36.9  C) (Tympanic)   Resp 16   Wt 72.6 kg (160 lb 2 oz)   LMP  (LMP Unknown)   BMI 27.27 kg/m      GENERAL APPEARANCE: alert, pleasant and no distress     EYES: EOMI, PERRL     HENT: ear canals and TM's normal and nose and mouth without ulcers or lesions     NECK: no adenopathy, no asymmetry, masses, or scars and thyroid normal to palpation     RESP: lungs clear to auscultation - no rales, rhonchi or wheezes     CV: regular rates and rhythm, normal S1 S2     ABDOMEN:  soft, nontender     MS: extremities normal- no gross deformities noted.     SKIN: no suspicious lesions or rashes     NEURO: Normal strength and tone, sensory exam grossly normal, mentation intact and speech normal     PSYCH: mentation appears normal. and affect normal/bright     LYMPHATICS: No cervical adenopathy    DIAGNOSTICS:   EKG:  last ekg on 10/23/2019 - NSR     Lab Results:   Component Latest Ref Rng & Units 12/17/2019   Sodium 133 - 144 mmol/L 137   Potassium 3.4 - 5.3 mmol/L 3.5   Chloride 94 - 109 mmol/L 106   Carbon Dioxide 20 - 32 mmol/L 29   Anion Gap 3 - 14 mmol/L 2 (L)   Glucose 70 - 99 mg/dL 92   Urea Nitrogen 7 - 30 mg/dL 11   Creatinine 0.52 - 1.04 mg/dL 0.82   GFR Estimate >60 mL/min/1.73:m2 72   GFR Estimate If Black >60 mL/min/1.73:m2 83   Calcium 8.5 - 10.1 mg/dL 9.1     Component Latest Ref Rng & Units 10/23/2019   WBC 4.0 - 11.0 10e9/L 8.5   RBC Count 3.8 - 5.2 10e12/L 4.80   Hemoglobin 11.7 - 15.7 g/dL 14.0   Hematocrit 35.0 - 47.0 % 42.2   MCV 78 - 100 fl 88   MCH 26.5 - 33.0 pg 29.2   MCHC 31.5 - 36.5 g/dL 33.2   RDW 10.0 - 15.0 % 12.6   Platelet Count 150 - 450 10e9/L 278   Diff Method  Automated Method   % Neutrophils    74.0   % Lymphocytes    18.8   % Monocytes 5.3   % Eosinophils 0.7   % Basophils     0.8   % Immature Granulocytes     0.4   Nucleated RBCs 0 /100 0   Absolute Neutrophil 1.6 - 8.3 10e9/L 6.3   Absolute Lymphocytes 0.8 - 5.3 10e9/L 1.6   Absolute Monocytes 0.0 - 1.3 10e9/L 0.5   Absolute Eosinophils 0.0 - 0.7 10e9/L 0.1   Absolute Basophils 0.0 - 0.2 10e9/L 0.1   Abs Immature Granulocytes 0 - 0.4 10e9/L 0.0   Absolute Nucleated RBC       0.0     IMPRESSION:     The proposed surgical procedure is considered LOW risk.    REVISED CARDIAC RISK INDEX  The patient has the following serious cardiovascular risks for perioperative complications such as (MI, PE, VFib and 3  AV Block):  No serious cardiac risks  INTERPRETATION: 1 risks: Class II (low risk - 0.9% complication rate)    The patient has the following additional risks for perioperative complications:  No identified additional risks    (Z01.818) Preop general physical exam  (primary encounter diagnosis)  Comment: No contraindications.  Plan: CANCELED: Basic metabolic panel  (Ca, Cl, CO2,         Creat, Gluc, K, Na, BUN), CANCELED: CBC with         platelets        (C50.911) Invasive ductal carcinoma of breast, right (H)  Comment: Somewhat unusual presentation, this appears to be her third independent presentation of breast cancer.  She does not seem to be a genetic link.  Plan: Patient is quite adamant she wishes to pursue lumpectomy and radiation therapy.    (I10) Hypertension goal BP (blood pressure) < 140/90  Comment: Within guidelines using single drug therapy.  Plan: Continue current medications.    (G45.9) TIA (transient ischemic attack)  Comment: No recurrence.  Treatment includes management of blood pressure and high-dose antiplatelet therapy.  Plan: Continue.  Stopping her aspirin would be acceptable for surgical procedure.      RECOMMENDATIONS:     --Patient is to take all scheduled medications on the day of surgery  EXCEPT for modifications listed below.    Anticoagulant or Antiplatelet Medication Use  ASPIRIN: Discontinue ASA 7-10 days prior to procedure to reduce bleeding risk.  It should be resumed post-operatively.        ACE Inhibitor or Angiotensin Receptor Blocker (ARB) Use  Ace inhibitor or Angiotensin Receptor Blocker (ARB) and will continue this medication due to the higher risk of uncontrolled perioperative hypertension.  Patient has a history of marked increased blood pressure in the settings of anxiety.      APPROVAL GIVEN to proceed with proposed procedure, without further diagnostic evaluation     The information in this document, created by a scribe for me, accurately reflects the services I personally performed and the decisions made by me. I have reviewed and approved this document for accuracy.     Signed Electronically by: Nhung Navarro MD    Copy of this evaluation report is provided to requesting physician.    [facility] Preop Guidelines    Revised Cardiac Risk Index

## 2020-01-14 ENCOUNTER — OFFICE VISIT (OUTPATIENT)
Dept: FAMILY MEDICINE | Facility: CLINIC | Age: 70
End: 2020-01-14
Payer: COMMERCIAL

## 2020-01-14 VITALS
BODY MASS INDEX: 27.27 KG/M2 | RESPIRATION RATE: 16 BRPM | SYSTOLIC BLOOD PRESSURE: 130 MMHG | WEIGHT: 160.13 LBS | TEMPERATURE: 98.5 F | DIASTOLIC BLOOD PRESSURE: 88 MMHG | HEART RATE: 66 BPM

## 2020-01-14 DIAGNOSIS — Z01.818 PREOP GENERAL PHYSICAL EXAM: Primary | ICD-10-CM

## 2020-01-14 DIAGNOSIS — C50.911 INVASIVE DUCTAL CARCINOMA OF BREAST, RIGHT (H): ICD-10-CM

## 2020-01-14 DIAGNOSIS — I10 HYPERTENSION GOAL BP (BLOOD PRESSURE) < 140/90: ICD-10-CM

## 2020-01-14 DIAGNOSIS — G45.9 TIA (TRANSIENT ISCHEMIC ATTACK): ICD-10-CM

## 2020-01-14 PROBLEM — F43.0 ACUTE REACTION TO STRESS: Status: RESOLVED | Noted: 2019-10-09 | Resolved: 2020-01-14

## 2020-01-14 PROCEDURE — 99215 OFFICE O/P EST HI 40 MIN: CPT | Performed by: FAMILY MEDICINE

## 2020-01-14 ASSESSMENT — PAIN SCALES - GENERAL: PAINLEVEL: NO PAIN (0)

## 2020-01-24 ENCOUNTER — ANESTHESIA EVENT (OUTPATIENT)
Dept: SURGERY | Facility: CLINIC | Age: 70
End: 2020-01-24
Payer: COMMERCIAL

## 2020-01-27 ENCOUNTER — HOSPITAL ENCOUNTER (OUTPATIENT)
Facility: CLINIC | Age: 70
Discharge: HOME | End: 2020-01-27
Attending: SURGERY | Admitting: SURGERY
Payer: COMMERCIAL

## 2020-01-27 ENCOUNTER — ANESTHESIA (OUTPATIENT)
Dept: SURGERY | Facility: CLINIC | Age: 70
End: 2020-01-27
Payer: COMMERCIAL

## 2020-01-27 ENCOUNTER — HOSPITAL ENCOUNTER (OUTPATIENT)
Dept: MAMMOGRAPHY | Facility: CLINIC | Age: 70
End: 2020-01-27
Attending: SURGERY | Admitting: SURGERY
Payer: COMMERCIAL

## 2020-01-27 ENCOUNTER — HOSPITAL ENCOUNTER (OUTPATIENT)
Dept: ULTRASOUND IMAGING | Facility: CLINIC | Age: 70
End: 2020-01-27
Attending: SURGERY | Admitting: SURGERY
Payer: COMMERCIAL

## 2020-01-27 VITALS
TEMPERATURE: 98.3 F | DIASTOLIC BLOOD PRESSURE: 82 MMHG | SYSTOLIC BLOOD PRESSURE: 144 MMHG | OXYGEN SATURATION: 93 % | HEART RATE: 92 BPM | RESPIRATION RATE: 16 BRPM | WEIGHT: 160 LBS | HEIGHT: 64 IN | BODY MASS INDEX: 27.31 KG/M2

## 2020-01-27 DIAGNOSIS — C50.911 INVASIVE DUCTAL CARCINOMA OF BREAST, RIGHT (H): ICD-10-CM

## 2020-01-27 PROCEDURE — 40000306 ZZH STATISTIC PRE PROC ASSESS II: Performed by: SURGERY

## 2020-01-27 PROCEDURE — 25000128 H RX IP 250 OP 636: Performed by: SURGERY

## 2020-01-27 PROCEDURE — 25000125 ZZHC RX 250: Performed by: NURSE ANESTHETIST, CERTIFIED REGISTERED

## 2020-01-27 PROCEDURE — 36000052 ZZH SURGERY LEVEL 2 EA 15 ADDTL MIN: Performed by: SURGERY

## 2020-01-27 PROCEDURE — 40000986 MA POST PROCEDURE RIGHT

## 2020-01-27 PROCEDURE — 76098 X-RAY EXAM SURGICAL SPECIMEN: CPT

## 2020-01-27 PROCEDURE — 25000128 H RX IP 250 OP 636: Performed by: NURSE ANESTHETIST, CERTIFIED REGISTERED

## 2020-01-27 PROCEDURE — 88307 TISSUE EXAM BY PATHOLOGIST: CPT | Mod: 26 | Performed by: SURGERY

## 2020-01-27 PROCEDURE — 19285 PERQ DEV BREAST 1ST US IMAG: CPT | Mod: RT,59

## 2020-01-27 PROCEDURE — 88307 TISSUE EXAM BY PATHOLOGIST: CPT | Performed by: SURGERY

## 2020-01-27 PROCEDURE — 27210794 ZZH OR GENERAL SUPPLY STERILE: Performed by: SURGERY

## 2020-01-27 PROCEDURE — 36000050 ZZH SURGERY LEVEL 2 1ST 30 MIN: Performed by: SURGERY

## 2020-01-27 PROCEDURE — 25000125 ZZHC RX 250: Performed by: SURGERY

## 2020-01-27 PROCEDURE — 37000009 ZZH ANESTHESIA TECHNICAL FEE, EACH ADDTL 15 MIN: Performed by: SURGERY

## 2020-01-27 PROCEDURE — 25800030 ZZH RX IP 258 OP 636: Performed by: NURSE ANESTHETIST, CERTIFIED REGISTERED

## 2020-01-27 PROCEDURE — 25000132 ZZH RX MED GY IP 250 OP 250 PS 637: Performed by: NURSE ANESTHETIST, CERTIFIED REGISTERED

## 2020-01-27 PROCEDURE — 71000027 ZZH RECOVERY PHASE 2 EACH 15 MINS: Performed by: SURGERY

## 2020-01-27 PROCEDURE — 25000125 ZZHC RX 250: Performed by: RADIOLOGY

## 2020-01-27 PROCEDURE — 19301 PARTIAL MASTECTOMY: CPT | Mod: RT | Performed by: SURGERY

## 2020-01-27 PROCEDURE — 37000008 ZZH ANESTHESIA TECHNICAL FEE, 1ST 30 MIN: Performed by: SURGERY

## 2020-01-27 RX ORDER — ALBUTEROL SULFATE 0.83 MG/ML
2.5 SOLUTION RESPIRATORY (INHALATION) EVERY 4 HOURS PRN
Status: CANCELLED | OUTPATIENT
Start: 2020-01-27

## 2020-01-27 RX ORDER — GLYCOPYRROLATE 0.2 MG/ML
INJECTION, SOLUTION INTRAMUSCULAR; INTRAVENOUS PRN
Status: DISCONTINUED | OUTPATIENT
Start: 2020-01-27 | End: 2020-01-27

## 2020-01-27 RX ORDER — SODIUM CHLORIDE, SODIUM LACTATE, POTASSIUM CHLORIDE, CALCIUM CHLORIDE 600; 310; 30; 20 MG/100ML; MG/100ML; MG/100ML; MG/100ML
INJECTION, SOLUTION INTRAVENOUS CONTINUOUS
Status: DISCONTINUED | OUTPATIENT
Start: 2020-01-27 | End: 2020-01-27 | Stop reason: HOSPADM

## 2020-01-27 RX ORDER — BUPIVACAINE HYDROCHLORIDE 5 MG/ML
INJECTION, SOLUTION PERINEURAL PRN
Status: DISCONTINUED | OUTPATIENT
Start: 2020-01-27 | End: 2020-01-27 | Stop reason: HOSPADM

## 2020-01-27 RX ORDER — LIDOCAINE HYDROCHLORIDE AND EPINEPHRINE 10; 10 MG/ML; UG/ML
INJECTION, SOLUTION INFILTRATION; PERINEURAL PRN
Status: DISCONTINUED | OUTPATIENT
Start: 2020-01-27 | End: 2020-01-27 | Stop reason: HOSPADM

## 2020-01-27 RX ORDER — LIDOCAINE HYDROCHLORIDE 10 MG/ML
INJECTION, SOLUTION INFILTRATION; PERINEURAL PRN
Status: DISCONTINUED | OUTPATIENT
Start: 2020-01-27 | End: 2020-01-27

## 2020-01-27 RX ORDER — ONDANSETRON 2 MG/ML
INJECTION INTRAMUSCULAR; INTRAVENOUS PRN
Status: DISCONTINUED | OUTPATIENT
Start: 2020-01-27 | End: 2020-01-27

## 2020-01-27 RX ORDER — NALOXONE HYDROCHLORIDE 0.4 MG/ML
.1-.4 INJECTION, SOLUTION INTRAMUSCULAR; INTRAVENOUS; SUBCUTANEOUS
Status: CANCELLED | OUTPATIENT
Start: 2020-01-27 | End: 2020-01-28

## 2020-01-27 RX ORDER — IBUPROFEN 600 MG/1
600 TABLET, FILM COATED ORAL
Status: CANCELLED | OUTPATIENT
Start: 2020-01-27

## 2020-01-27 RX ORDER — FENTANYL CITRATE 50 UG/ML
INJECTION, SOLUTION INTRAMUSCULAR; INTRAVENOUS PRN
Status: DISCONTINUED | OUTPATIENT
Start: 2020-01-27 | End: 2020-01-27

## 2020-01-27 RX ORDER — GABAPENTIN 300 MG/1
300 CAPSULE ORAL ONCE
Status: DISCONTINUED | OUTPATIENT
Start: 2020-01-27 | End: 2020-01-27

## 2020-01-27 RX ORDER — LIDOCAINE 40 MG/G
CREAM TOPICAL
Status: DISCONTINUED | OUTPATIENT
Start: 2020-01-27 | End: 2020-01-27 | Stop reason: HOSPADM

## 2020-01-27 RX ORDER — ACETAMINOPHEN 325 MG/1
975 TABLET ORAL ONCE
Status: COMPLETED | OUTPATIENT
Start: 2020-01-27 | End: 2020-01-27

## 2020-01-27 RX ORDER — DEXAMETHASONE SODIUM PHOSPHATE 4 MG/ML
INJECTION, SOLUTION INTRA-ARTICULAR; INTRALESIONAL; INTRAMUSCULAR; INTRAVENOUS; SOFT TISSUE PRN
Status: DISCONTINUED | OUTPATIENT
Start: 2020-01-27 | End: 2020-01-27

## 2020-01-27 RX ORDER — PROPOFOL 10 MG/ML
INJECTION, EMULSION INTRAVENOUS CONTINUOUS PRN
Status: DISCONTINUED | OUTPATIENT
Start: 2020-01-27 | End: 2020-01-27

## 2020-01-27 RX ORDER — ONDANSETRON 4 MG/1
4 TABLET, ORALLY DISINTEGRATING ORAL EVERY 30 MIN PRN
Status: CANCELLED | OUTPATIENT
Start: 2020-01-27

## 2020-01-27 RX ORDER — DIMENHYDRINATE 50 MG/ML
25 INJECTION, SOLUTION INTRAMUSCULAR; INTRAVENOUS
Status: CANCELLED | OUTPATIENT
Start: 2020-01-27

## 2020-01-27 RX ORDER — FENTANYL CITRATE 50 UG/ML
25-50 INJECTION, SOLUTION INTRAMUSCULAR; INTRAVENOUS
Status: CANCELLED | OUTPATIENT
Start: 2020-01-27

## 2020-01-27 RX ORDER — METOCLOPRAMIDE 10 MG/1
10 TABLET ORAL EVERY 6 HOURS PRN
Status: CANCELLED | OUTPATIENT
Start: 2020-01-27

## 2020-01-27 RX ORDER — SODIUM CHLORIDE, SODIUM LACTATE, POTASSIUM CHLORIDE, CALCIUM CHLORIDE 600; 310; 30; 20 MG/100ML; MG/100ML; MG/100ML; MG/100ML
INJECTION, SOLUTION INTRAVENOUS CONTINUOUS
Status: CANCELLED | OUTPATIENT
Start: 2020-01-27

## 2020-01-27 RX ORDER — CLINDAMYCIN PHOSPHATE 900 MG/50ML
900 INJECTION, SOLUTION INTRAVENOUS SEE ADMIN INSTRUCTIONS
Status: DISCONTINUED | OUTPATIENT
Start: 2020-01-27 | End: 2020-01-27 | Stop reason: HOSPADM

## 2020-01-27 RX ORDER — HYDROMORPHONE HYDROCHLORIDE 1 MG/ML
.3-.5 INJECTION, SOLUTION INTRAMUSCULAR; INTRAVENOUS; SUBCUTANEOUS EVERY 10 MIN PRN
Status: CANCELLED | OUTPATIENT
Start: 2020-01-27

## 2020-01-27 RX ORDER — DOCUSATE SODIUM 100 MG/1
100 CAPSULE, LIQUID FILLED ORAL 2 TIMES DAILY
Refills: 0 | COMMUNITY
Start: 2020-01-27 | End: 2020-02-11

## 2020-01-27 RX ORDER — ONDANSETRON 2 MG/ML
4 INJECTION INTRAMUSCULAR; INTRAVENOUS EVERY 30 MIN PRN
Status: CANCELLED | OUTPATIENT
Start: 2020-01-27

## 2020-01-27 RX ORDER — CLINDAMYCIN PHOSPHATE 900 MG/50ML
900 INJECTION, SOLUTION INTRAVENOUS
Status: DISCONTINUED | OUTPATIENT
Start: 2020-01-27 | End: 2020-01-27 | Stop reason: HOSPADM

## 2020-01-27 RX ORDER — METOCLOPRAMIDE HYDROCHLORIDE 5 MG/ML
10 INJECTION INTRAMUSCULAR; INTRAVENOUS EVERY 6 HOURS PRN
Status: CANCELLED | OUTPATIENT
Start: 2020-01-27

## 2020-01-27 RX ORDER — HYDROCODONE BITARTRATE AND ACETAMINOPHEN 5; 325 MG/1; MG/1
1 TABLET ORAL
Status: CANCELLED | OUTPATIENT
Start: 2020-01-27

## 2020-01-27 RX ORDER — HYDROCODONE BITARTRATE AND ACETAMINOPHEN 5; 325 MG/1; MG/1
1 TABLET ORAL EVERY 6 HOURS PRN
Qty: 12 TABLET | Refills: 0 | Status: SHIPPED | OUTPATIENT
Start: 2020-01-27 | End: 2020-02-11

## 2020-01-27 RX ORDER — MEPERIDINE HYDROCHLORIDE 25 MG/ML
12.5 INJECTION INTRAMUSCULAR; INTRAVENOUS; SUBCUTANEOUS
Status: CANCELLED | OUTPATIENT
Start: 2020-01-27

## 2020-01-27 RX ORDER — ACETAMINOPHEN 325 MG/1
650 TABLET ORAL
Status: CANCELLED | OUTPATIENT
Start: 2020-01-27

## 2020-01-27 RX ADMIN — MIDAZOLAM HYDROCHLORIDE 2 MG: 1 INJECTION, SOLUTION INTRAMUSCULAR; INTRAVENOUS at 09:37

## 2020-01-27 RX ADMIN — GLYCOPYRROLATE 0.1 MG: 0.2 INJECTION, SOLUTION INTRAMUSCULAR; INTRAVENOUS at 09:37

## 2020-01-27 RX ADMIN — PROPOFOL 50 MCG/KG/MIN: 10 INJECTION, EMULSION INTRAVENOUS at 09:39

## 2020-01-27 RX ADMIN — SODIUM CHLORIDE, POTASSIUM CHLORIDE, SODIUM LACTATE AND CALCIUM CHLORIDE 1000 ML: 600; 310; 30; 20 INJECTION, SOLUTION INTRAVENOUS at 08:12

## 2020-01-27 RX ADMIN — MIDAZOLAM HYDROCHLORIDE 1 MG: 1 INJECTION, SOLUTION INTRAMUSCULAR; INTRAVENOUS at 09:44

## 2020-01-27 RX ADMIN — LIDOCAINE HYDROCHLORIDE 100 MG: 10 INJECTION, SOLUTION INFILTRATION; PERINEURAL at 09:39

## 2020-01-27 RX ADMIN — LIDOCAINE HYDROCHLORIDE 6 ML: 10 INJECTION, SOLUTION EPIDURAL; INFILTRATION; INTRACAUDAL; PERINEURAL at 08:57

## 2020-01-27 RX ADMIN — LIDOCAINE HYDROCHLORIDE 1 ML: 10 INJECTION, SOLUTION EPIDURAL; INFILTRATION; INTRACAUDAL; PERINEURAL at 08:12

## 2020-01-27 RX ADMIN — ONDANSETRON 4 MG: 2 INJECTION INTRAMUSCULAR; INTRAVENOUS at 09:42

## 2020-01-27 RX ADMIN — DEXAMETHASONE SODIUM PHOSPHATE 4 MG: 4 INJECTION, SOLUTION INTRA-ARTICULAR; INTRALESIONAL; INTRAMUSCULAR; INTRAVENOUS; SOFT TISSUE at 09:42

## 2020-01-27 RX ADMIN — CLINDAMYCIN PHOSPHATE 900 MG: 900 INJECTION, SOLUTION INTRAVENOUS at 09:37

## 2020-01-27 RX ADMIN — ACETAMINOPHEN 975 MG: 325 TABLET, FILM COATED ORAL at 08:15

## 2020-01-27 RX ADMIN — FENTANYL CITRATE 100 MCG: 50 INJECTION, SOLUTION INTRAMUSCULAR; INTRAVENOUS at 09:39

## 2020-01-27 RX ADMIN — MIDAZOLAM HYDROCHLORIDE 1 MG: 1 INJECTION, SOLUTION INTRAMUSCULAR; INTRAVENOUS at 09:49

## 2020-01-27 RX ADMIN — GLYCOPYRROLATE 0.1 MG: 0.2 INJECTION, SOLUTION INTRAMUSCULAR; INTRAVENOUS at 09:39

## 2020-01-27 RX ADMIN — MIDAZOLAM HYDROCHLORIDE 1 MG: 1 INJECTION, SOLUTION INTRAMUSCULAR; INTRAVENOUS at 09:54

## 2020-01-27 ASSESSMENT — MIFFLIN-ST. JEOR: SCORE: 1229.73

## 2020-01-27 NOTE — ANESTHESIA CARE TRANSFER NOTE
Patient: Milly Loaiza    Procedure(s):  Right Wire Localized Breast Lumpectomy(wire placement at 8:30)    Diagnosis: Invasive ductal carcinoma of breast, right (H) [C50.911]  Diagnosis Additional Information: No value filed.    Anesthesia Type:   No value filed.     Note:  Airway :Room Air  Patient transferred to:Phase II  Handoff Report: Identifed the Patient, Identified the Reponsible Provider, Reviewed the pertinent medical history, Discussed the surgical course, Reviewed Intra-OP anesthesia mangement and issues during anesthesia, Set expectations for post-procedure period and Allowed opportunity for questions and acknowledgement of understanding      Vitals: (Last set prior to Anesthesia Care Transfer)    CRNA VITALS  1/27/2020 1014 - 1/27/2020 1056      1/27/2020             Pulse:  111    SpO2:  99 %                Electronically Signed By: FABIANA Roper CRNA  January 27, 2020  10:56 AM

## 2020-01-27 NOTE — PROGRESS NOTES
RADIOLOGY PROCEDURE NOTE  Patient name: Milly Loaiza  MRN: [id number]  : 1950    Pre-procedure diagnosis: Right breast malignancy.  Post-procedure diagnosis: Same    Procedure Date/Time: 2020  9:03 AM  Procedure: US guided wire localization.  Estimated blood loss: None  Specimen(s) collected with description: None.  The patient tolerated the procedure well with no immediate complications.    See imaging dictation for procedural details.    Provider name: Yuri Mathur MD  Assistant(s):None

## 2020-01-27 NOTE — ANESTHESIA POSTPROCEDURE EVALUATION
Patient: Milly Loaiza    Procedure(s):  Right Wire Localized Breast Lumpectomy(wire placement at 8:30)    Diagnosis:Invasive ductal carcinoma of breast, right (H) [C50.911]  Diagnosis Additional Information: No value filed.    Anesthesia Type:  No value filed.    Note:  Anesthesia Post Evaluation    Patient location during evaluation: Phase 2  Patient participation: Able to fully participate in evaluation  Level of consciousness: awake and alert  Pain management: adequate  Airway patency: patent  Cardiovascular status: acceptable and hemodynamically stable  Respiratory status: acceptable, room air and spontaneous ventilation  Hydration status: acceptable  PONV: none     Anesthetic complications: None          Last vitals:  Vitals:    01/27/20 1100 01/27/20 1115 01/27/20 1130   BP: (!) 141/83 (!) 142/82 (!) 144/82   Pulse:  94 92   Resp: 16 16 16   Temp:      SpO2: 96% 95% 93%         Electronically Signed By: FABIANA Roper CRNA  January 27, 2020  11:47 AM

## 2020-01-27 NOTE — OP NOTE
Procedure Date: 01/27/20     PREOPERATIVE DIAGNOSIS: Right Breast Cancer (invasive ductal carcinoma)  POSTOPERATIVE DIAGNOSIS: Same     PROCEDURE: Right Partial Mastectomy (after guidewire localization)     ATTENDING SURGEON: Gallo King DO    Assistant: Michael Chamorro MS-4    ESTIMATED BLOOD LOSS: 3mL    ANESTHESIA: Monitored care anesthesia plus Local Anesthesia (0.25% Marcaine and 1% Lidocaine in a 50:50 mixture)     INDICATIONS FOR PROCEDURE: : Milly Loaiza presents with a history of a recurrent right invasive ductal carcinoma breast mass, biopsied via image-guided percutaneous means, with features of invasive ductal carcinoma, ER+, ID+, HER2- noted on histology. After discussion was held with the patient regarding indications, risks, benefits and alternatives, benefits, and risks, her questions were addressed and she understood and wished to proceed with right partial mastectomy (after guidewire localization).  She has had previous right axillary lymphadenectomy with her previous carcinoma.  Her case was discussed at multidisciplinary tumor board. Specific risks discussed included bleeding, infection, seroma, need for additional treatment, nontherapeutic intervention, wound complication (such as dehiscence), recurrence, cosmetic deformity, potential need for additional surgical resection, potential for drain placement, lymphedema, and rare complications related to surgery and/or anesthesia such as venous thromboembolism and cardiorespiratory complications.     PROCEDURE: After informed consent was obtained, the patient was brought to the operating room and placed in the supine position on the Operating Room table. Anesthesia was then administered. The right breast and upper extremity were then prepped and draped in the typical sterile fashion. A time-out was performed to verify patient and procedure.      Local anesthetic was delivered around the entry point of the guidewire in the upper inner quadrant of  the right breast, and a radialincision was carried out. The underlying breast tissue was then removed carefully using a combination of electrocautery and sharp dissection, while assessing the trajectory of the guidewire on pre-operative imaging to insure appropriate resection of all diseased tissue. Once the partial mastectomy was complete, the specimen was oriented using the margin paint kit. Additional shave margins were taken from the medial, posterior and superior portions with  sharp dissection alone, with the tumor side of the specimen marked with a silk suture, in the event the tumor from the primary specimen closely approximated this area. Hemostasis was again assured, and the cavity was cleansed using sterile irrigant. The primary specimen was sent to radiology, and mammographic confirmation of complete resection was received shortly thereafter. Hemoclips were placed in the cavity of the breast to ning the superior, inferior, medial and lateral margins of dissection. The breast tissue was reapproximated to fill the cavity/void and mitigate seroma formation.     An instrument count, including laparotomy pads, sponges and needles was performed and found to be correct.       The subcutaneous tissue was closed in layers using interrupted 3-0 Vicryl suture and skin was closed using a subcuticular suture of 4-0 Monocryl. The skin was cleansed and dried, before administration of Dermabond glue. A compression bra was then administered.     The patient tolerated the procedure well, was allowed to recover and was transferred to the recovery room in stable condition.    Gallo King DO on 1/27/2020 at 10:57 AM

## 2020-01-27 NOTE — DISCHARGE INSTRUCTIONS
HOME CARE FOLLOWING LUMPECTOMY      APPOINTMENT WITH YOUR SURGEON:  You will be scheduled to see your surgeon in 1 week to discuss your pathology results and for a wound check.    SUPPORT:  Wear a bra for support and comfort for 3-7 days, day and night.    DRAIN:  If you have a drain in place, you will need a separate appointment with a nurse in the office to have this removed.  You will have a form to keep track of the output of your drain.  When the output reaches a point where the total for a 24 hour period is less than 30cc s, you are ready to have the drain removed.  At that point you can call the office and talk to the nurse to arrange coming into the office to have this done.  If you have more than one drain in place, they may not all be removed at the same time, as the outputs can be very different from each.  The nurse will help you to manage this and help decide when the remaining drains will be taken out.    INCISIONAL CARE:    If you have a dressing in place, keep clean and dry for 48 hours; you may replace the gauze if it becomes soiled.    After 48 hours you may remove the dressing and shower.  Do not submerse incision in water for 1 week.    If you have a Dermabond dressing (a type of skin glue), you may shower immediately.    Sutures will absorb and do not need to be removed.    If present, leave the steri-strips (white paper tapes) in place for 14 days after surgery.    If present, leave Dermabond glue in place until it wears/flakes off.    You may expect a small amount of drainage from your incision.    A lump/ridge under the incision is normal and will gradually resolve.    BATHING:  You are allowed to bath (shallow water in a tub only) or shower 24-48 hours after your surgery.  Mild soap is OK to use near these sites.  When bathing, do not allow the incision or drain site to become submersed in water as this may increase the risk of infection.    ACTIVITY:  Cautiously resume exercise and  strenuous activities such as jogging, tennis, aerobics, etc. Also, be careful of stretching activities with operative side for two weeks.    If you had a  sentinel node biopsy  at the time of your surgery:  You have no restrictions in addition to those noted above.    If you had an  axillary node dissection  at the time of your surgery:  You are recommended to restrict the activity of the arm on the side the dissection was done on.  This means:  no reaching overhead until cleared to do so by your surgeon, no carrying weight on that side greater than a couple pounds, no injections/vaccinations/ blood samples from that side, and no blood pressure measurements on that side.  It is also recommended to elevate the arm on pillows several times a day to decrease/minimize swelling, and avoid sleeping on that arm.    DIET:  No restrictions.  Increased fluid intake is recommended. While taking pain medications, increase dietary fiber or add a fiber supplementation like Metamucil or Citrucel to help prevent constipation - a possible side effect of pain medications.    DISCOMFORT:  Local anesthetic placed at surgery should provide relief for 4-8 hours.  Begin taking pain pills before discomfort is severe.  Take the pain medication with some food, when possible, to minimize side effects.  Intermittent use of ice packs may help during the first 48 hours.  Expect gradual improvement.    RETURN APPOINTMENT:  Schedule a follow-up visit 2-3 weeks post-op.  Office Phone:  [phone]     CONTACT US IF THE FOLLOWING DEVELOPS:   1. A fever that is above 101     2. If there is a large amount of drainage, bleeding, or swelling.   3. Severe pain that is not relieved by your prescription.   4. Drainage that is thick, cloudy, yellow, green or white.   5. Any other questions not answered by  Frequently Asked Questions  sheet.      FREQUENTLY ASKED QUESTIONS:    Q:  How should my incision look?    A:  Normally your incision will appear  slightly swollen with light redness directly along the incision itself as it heals.  It may feel like a bump or ridge as the healing/scarring happens, and over time (3-4 months) this bump or ridge feeling should slowly go away.  In general, clear or pink watery drainage can be normal at first as your incision heals, but should decrease over time.    Q:  How do I know if my incision is infected?  A:  Look at your incision for signs of infection, like redness around the incision spreading to surrounding skin, or drainage of cloudy or foul-smelling drainage.  If you feel warm, check your temperature to see if you are running a fever.    **If any of these things occur, please notify the nurse at our office.  We may need you to come into the office for an incision check.      Q:  How do I take care of my incision?  A:  If you have a dressing in place - Starting the day after surgery, replace the dressing 1-2 times a day until there is no further drainage from the incision.  At that time, a dressing is no longer needed.  Try to minimize tape on the skin if irritation is occurring at the tape sites.  If you have significant irritation from tape on the skin, please call the office to discuss other method of dressing your incision.    Small pieces of tape called  steri-strips  may be present directly overlying your incision; these may be removed 10 days after surgery unless otherwise specified by your surgeon.  If these tapes start to loosen at the ends, you may trim them back until they fall off or are removed.    A:  If you had  Dermabond  tissue glue used as a dressing (this causes your incision to look shiny with a clear covering over it) - This type of dressing wears off with time and does not require more dressings over the top unless it is draining around the glue as it wears off.  Do not apply ointments or lotions over the incisions until the glue has completely worn off.    Q:  There is a piece of tape or a sticky   lead  still on my skin.  Can I remove this?  A:  Sometimes the sticky  leads  used for monitoring during surgery or for evaluation in the emergency department are not all removed while you are in the hospital.  These sometimes have a tab or metal dot on them.  You can easily remove these on your own, like taking off a band-aid.  If there is a gel substance under the  lead , simply wipe/clean it off with a washcloth or paper towel.      Q:  What can I do to minimize constipation (very hard stools, or lack of stools)?  A:  Stay well hydrated.  Increase your dietary fiber intake or take a fiber supplement -with plenty of water.  Walk around frequently.  You may consider an over-the-counter stool-softener.  Your Pharmacist can assist you with choosing one that is stocked at your pharmacy.  Constipation is also one of the most common side effects of pain medication.  If you are using pain medication, be pro-active and try to PREVENT problems with constipation by taking the steps above BEFORE constipation becomes a problem.    Q:  What do I do if I need more pain medications?  A:  Call the office to receive refills.  Be aware that certain pain meds cannot be called into a pharmacy and actually require a paper prescription.  A change may be made in your pain med as you progress thru your recovery period or if you have side effects to certain meds.    --Pain meds are NOT refilled after 5pm on weekdays, and NOT AT ALL on the weekends, so please look ahead to prevent problems.      Q:  Why am I having a hard time sleeping now that I am at home?  A:  Many medications you receive while you are in the hospital can impact your sleep for a number of days after your surgery/hospitalization.  Decreased level of activity and naps during the day may also make sleeping at night difficult.  Try to minimize day-time naps, and get up frequently during the day to walk around your home during your recovery time.  Sleep aides may be of some  help, but are not recommended for long-term use.      Q:  I am having some back discomfort.  What should I do?  A:  This may be related to certain positioning that was required for your surgery, extended periods of time in bed, or other changes in your overall activity level.  You may try ice, heat, acetaminophen, or ibuprofen to treat this temporarily.  Note that many pain medications have acetaminophen in them and would state this on the prescription bottle.  Be sure not to exceed the maximum of 4000mg per day of acetaminophen.     **If the pain you are having does not resolve, is severe, or is a flare of back pain you have had on other occasions prior to surgery, please contact your primary physician for further recommendations or for an appointment to be examined at their office.    Q:  Why am I having headaches?  A:  Headaches can be caused by many things:  caffeine withdrawal, use of pain meds, dehydration, high blood pressure, lack of sleep, over-activity/exhaustion, flare-up of usual migraine headaches.  If you feel this is related to muscle tension (a band-like feeling around the head, or a pressure at the low-back of the head) you may try ice or heat to this area.  You may need to drink more fluids (try electrolyte drink like Gatorade), rest, or take your usual migraine medications.   **If your headaches do not resolve, worsen, are accompanied by other symptoms, or if your blood pressure is high, please call your primary physician for recommendation and/or examination.    Q:  I am unable to urinate.  What do I do?  A:  A small percentage of people can have difficulty urinating initially after surgery.  This includes being able to urinate only a very small amount at a time and feeling discomfort or pressure in the very low abdomen.  This is called  urinary retention , and is actually an urgent situation.  Proceed to your nearest Emergency department for evaluation (not an Urgent Care Center).  Sometimes the  bladder does not work correctly after certain medications you receive during surgery, or related to certain procedures.  You may need to have a catheter placed until your bladder recovers.  When planning to go to an Emergency department, it may help to call the ER to let them know you are coming in for this problem after a surgery.  This may help you get in quicker to be evaluated.  **If you have symptoms of a urinary tract infection, please contact your primary physician for the proper evaluation and treatment.          If you have other questions, please call the office Monday thru Friday between 8am and 5pm to discuss with the nurse.  # [phone]    There is a surgeon ON CALL on weekday evenings and over the weekend in case of urgent need only, and may be contacted at the same number.    If you are having an emergency, call 911 or proceed to your nearest emergency department.    Break through Bleeding  As instructed per Surgeon or Nurse.  If you have any bleeding or drainage, contact your surgeon.    Post Op Infection  Be alert for signs of infection: redness, swelling, heat, drainage of pus, and/or elevated temperature.  Contact your surgeon if these occur.    Nausea   If post op nausea occurs, at first rest your stomach for a few hours by eating nothing solid and sipping only clear liquids.  Call your Surgeon if nausea does not resolve in 24 hours.

## 2020-02-02 LAB — COPATH REPORT: NORMAL

## 2020-02-09 NOTE — PROGRESS NOTES
CHIEF COMPLAINT AND REASON FOR VISIT:   Right breast cancer , ,        HISTORY OF ONCOLOGY ILLNESS: Milly Loaiza was diagnosed first time end of  through screening mammogram right breast cancer, lumpectomy 2004 [id number] poorly differentiated infiltrating ductal cancer, ER negative, HER-2 negative.   She had 3 cycles of AC, could not tolerate anymore chemotherapy, followed by radiation.    She had a second breast cancer on the right side diagnosed screening mammogram 2008. Biopsy 2009 at [facility] indicating high-grade DCIS with necrosis, not enough tissue for ER/MO studies. Pathology from lumpectomy 2009 no residual invasive cancer.    She has abnormal screening mammogram 2019 which found 0.9 cm focal asymmetry 2-3 o'clock in the right breast 2.7 cm from the nipple.  US biopsy found IDC grade III, ER/[place]-, Her 2 FISH equivocal, IHC 1+, so combined Her2 final reading is negative.      INTERVAL HISTORY:  Due to the small size of the lesion, her prior poor tolerance to chemotherapy, upfront surgical lumpectomy is offered 2020 found   IDC 1 cm, grade 2, no LVI, pT1bNx. No LN removed due to prior LND surgery.    PAST MEDICAL HISTORY: Nothing other than breast cancer.     MEDICATIONS: No routine medication other than multivitamin.     ALLERGIES: Sulfa.     SOCIAL HISTORY: She lives with her  at home.  She has a lot of animals to take care of.     FAMILY HISTORY: Paternal grandmother was diagnosed with breast cancer and  from that at age 68 back in the 60s.     HABITS: Never smoked. Does not use alcohol.       REVIEW OF SYSTEMS:   Milly Loaiza is in her usual state of health, very active, a middle-aged woman, looks like her stated age. 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 She is taking vitamin D.     PHYSICAL EXAMINATION:   VITAL SIGNS: Blood pressure (!) 148/86, pulse 79, temperature 98.4  F (36.9  C), temperature source Tympanic, resp. rate 20, height 1.626 m  "(5' 4\"), weight 72.3 kg (159 lb 8 oz), SpO2 96 %, not currently breastfeeding.    GENERAL APPEARANCE: Looks like her stated age, not in acute distress.   HEENT: The patient is normocephalic, atraumatic. Pupils are equal react to light. Sclerae are anicteric. Moist oral mucosa. Negative pharynx. No oral thrush. NECK: Supple. No jugular venous distention. Thyroid is not palpable.   LYMPH NODES: Superficial lymphadenopathy is not appreciable in the bilateral cervical, supraclavicular, axillary or inguinal adenopathy. CARDIOVASCULAR: S1, S2 regular with no murmurs or gallops. No carotid or abdominal bruits. PULMONARY: Lungs are clear to auscultation and percussion bilaterally. There is no wheezing or rhonchi. GASTROINTESTINAL: Abdomen is soft, nontender. No hepatosplenomegaly. No signs of ascites. No mass appreciable. MUSCULOSKELETAL/EXTREMITIES: No edema. No cyanotic changes. No signs of joint deformity. No lymphedema. NEUROLOGIC: Cranial nerves II-XII are grossly intact. Sensation intact. Muscle strength and muscle tone symmetrical all through 5/5. BACK: No spinal or paraspinal tenderness. No CVA tenderness.   SKIN: No petechiae. No rash. No signs of cellulitis.   BREASTS: She has retracted nipple on the right side. No palpable lesion.  Left breast exam is negative.    CURRENT LAB DATA REVIEWED TODAY  Lumpectomy is offered 1/2020 found IDC 1 cm, grade 2, no LVI, no LN removed due to prior surgery, pT1bNx.    12/2019 BRCA actionable panel is negative.     12/2019 right breast 2-3 o'clock in the right breast 2.7 cm from the nipple 0.9 cm nodule US biopsy found IDC grade III, Er/[place]-, Her 2 FISH equivocal, IHC pending.       OLD DATA REVIEW IN SUMMARY:  [id number] at 42 in 9/2019, at  46 in 10/2017, at 50 in 10/2016, at 36 in 10/2015, at 46 in 12/2014, 46 in 10/2014  from nl, at 39 in 2011.    MA 9/2019- There is a 0.9 cm focal asymmetry at 2-3:00 in the right breast and 2.7 cm from the nipple.       ASSESSMENT AND PLAN:   1. " Two-time breast cancer survivor. First time was triple negative breast cancer. The second one was DCIS.   New abnormal MA on the right side, right breast 2-3 o'clock in the right breast 2.7 cm from the nipple 0.9 cm nodule US biopsy found IDC grade III, ER/[place]-, Her 2 negative.    Due to the small size of the lesion, her prior poor tolerance to chemotherapy, upfront surgical lumpectomy is offered 1/2020 found   IDC 1 cm, grade 2, no LVI, no LN removed due to prior LND surgery, pT1bNx.  Discuss with radiation oncologist about the feasibility of second postlumpectomy radiation considering her prior right was offered in 2003. Dr. Segura feel very likely she will be able to get RT again.  Will do the referral.     We discussed the role of adjuvant systemic chemotherapy for triple negative breast cancer in postop setting.  Since her tumor is greater than 5 mm, would recommend her to consider some form of systemic adjuvant chemotherapy.  Considering her prior poor tolerance to AC, recommend her to consider weekly Taxol.  We discussed the chemotherapy side effect, which include but not limited to GI toxicity nausea vomiting, lower immunity and bleeding risk from cytopenia, neurotoxicity, cardica toxicity,  infusion related reaction, mortality, etc.  She made informed decision not to proceed with chemo.     She is open to meet with Rad-Onc.     2. Elevation of her tumor marker is fluctuating with unclear etiology.   We discuss the draw back of this test, and what is the proper way to randolph it and ASCO guideline on not doing it.   She is aware of it, yet still wants to do it.   Now looking back, this maybe related to her breast cancer recurrence.   Will check it again at post op setting to see whether it is still elevated.     Total time is about 25 minutes, all spent in counseling regarding her third time breast cancer diagnosis, clinical stage, modality treatment plan,

## 2020-02-10 ENCOUNTER — ONCOLOGY VISIT (OUTPATIENT)
Dept: ONCOLOGY | Facility: CLINIC | Age: 70
End: 2020-02-10
Attending: INTERNAL MEDICINE
Payer: COMMERCIAL

## 2020-02-10 VITALS
TEMPERATURE: 98.4 F | HEART RATE: 79 BPM | BODY MASS INDEX: 27.23 KG/M2 | SYSTOLIC BLOOD PRESSURE: 148 MMHG | DIASTOLIC BLOOD PRESSURE: 86 MMHG | RESPIRATION RATE: 20 BRPM | WEIGHT: 159.5 LBS | HEIGHT: 64 IN | OXYGEN SATURATION: 96 %

## 2020-02-10 DIAGNOSIS — R97.8 ELEVATED TUMOR MARKERS: ICD-10-CM

## 2020-02-10 DIAGNOSIS — C50.911 RECURRENT BREAST CANCER, RIGHT (H): Primary | ICD-10-CM

## 2020-02-10 PROCEDURE — G0463 HOSPITAL OUTPT CLINIC VISIT: HCPCS

## 2020-02-10 ASSESSMENT — PAIN SCALES - GENERAL: PAINLEVEL: NO PAIN (0)

## 2020-02-10 ASSESSMENT — MIFFLIN-ST. JEOR: SCORE: 1223.49

## 2020-02-10 NOTE — LETTER
2/10/2020         RE: Milly Loaiza  [address]  [facility] [address]        Dear Colleague,    Thank you for referring your patient, Milly Loaiza, to the [facility]. Please see a copy of my visit note below.    CHIEF COMPLAINT AND REASON FOR VISIT:   Right breast cancer , ,        HISTORY OF ONCOLOGY ILLNESS: Milly Loaiza was diagnosed first time end of  through screening mammogram right breast cancer, lumpectomy 2004 [id number] poorly differentiated infiltrating ductal cancer, ER negative, HER-2 negative.   She had 3 cycles of AC, could not tolerate anymore chemotherapy, followed by radiation.    She had a second breast cancer on the right side diagnosed screening mammogram 2008. Biopsy 2009 at [facility] indicating high-grade DCIS with necrosis, not enough tissue for ER/AR studies. Pathology from lumpectomy 2009 no residual invasive cancer.    She has abnormal screening mammogram 2019 which found 0.9 cm focal asymmetry 2-3 o'clock in the right breast 2.7 cm from the nipple.  US biopsy found IDC grade III, ER/AR-, Her 2 FISH equivocal, IHC 1+, so combined Her2 final reading is negative.      INTERVAL HISTORY:  Due to the small size of the lesion, her prior poor tolerance to chemotherapy, upfront surgical lumpectomy is offered 2020 found   IDC 1 cm, grade 2, no LVI, pT1bNx. No LN removed due to prior LND surgery.    PAST MEDICAL HISTORY: Nothing other than breast cancer.     MEDICATIONS: No routine medication other than multivitamin.     ALLERGIES: Sulfa.     SOCIAL HISTORY: She lives with her  at home.  She has a lot of animals to take care of.     FAMILY HISTORY: Paternal grandmother was diagnosed with breast cancer and  from that at age 68 back in the 60s.     HABITS: Never smoked. Does not use alcohol.       REVIEW OF SYSTEMS:   Milly Loaiza is in her usual state of health, very active, a middle-aged woman, looks like her stated  "age. 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 She is taking vitamin D.     PHYSICAL EXAMINATION:   VITAL SIGNS: Blood pressure (!) 148/86, pulse 79, temperature 98.4  F (36.9  C), temperature source Tympanic, resp. rate 20, height 1.626 m (5' 4\"), weight 72.3 kg (159 lb 8 oz), SpO2 96 %, not currently breastfeeding.    GENERAL APPEARANCE: Looks like her stated age, not in acute distress.   HEENT: The patient is normocephalic, atraumatic. Pupils are equal react to light. Sclerae are anicteric. Moist oral mucosa. Negative pharynx. No oral thrush. NECK: Supple. No jugular venous distention. Thyroid is not palpable.   LYMPH NODES: Superficial lymphadenopathy is not appreciable in the bilateral cervical, supraclavicular, axillary or inguinal adenopathy. CARDIOVASCULAR: S1, S2 regular with no murmurs or gallops. No carotid or abdominal bruits. PULMONARY: Lungs are clear to auscultation and percussion bilaterally. There is no wheezing or rhonchi. GASTROINTESTINAL: Abdomen is soft, nontender. No hepatosplenomegaly. No signs of ascites. No mass appreciable. MUSCULOSKELETAL/EXTREMITIES: No edema. No cyanotic changes. No signs of joint deformity. No lymphedema. NEUROLOGIC: Cranial nerves II-XII are grossly intact. Sensation intact. Muscle strength and muscle tone symmetrical all through 5/5. BACK: No spinal or paraspinal tenderness. No CVA tenderness.   SKIN: No petechiae. No rash. No signs of cellulitis.   BREASTS: She has retracted nipple on the right side. No palpable lesion.  Left breast exam is negative.    CURRENT LAB DATA REVIEWED TODAY  Lumpectomy is offered 1/2020 found IDC 1 cm, grade 2, no LVI, no LN removed due to prior surgery, pT1bNx.    12/2019 BRCA actionable panel is negative.     12/2019 right breast 2-3 o'clock in the right breast 2.7 cm from the nipple 0.9 cm nodule US biopsy found IDC grade III, Er/[place]-, Her 2 FISH equivocal, IHC pending.       OLD DATA REVIEW IN SUMMARY:  [id number] at 44 in 9/2019, at  48 " in 10/2017, at 50 in 10/2016, at 36 in 10/2015, at 46 in 12/2014, 46 in 10/2014  from nl, at 41 in 2011.    MA 9/2019- There is a 0.9 cm focal asymmetry at 2-3:00 in the right breast and 2.7 cm from the nipple.       ASSESSMENT AND PLAN:   1. Two-time breast cancer survivor. First time was triple negative breast cancer. The second one was DCIS.   New abnormal MA on the right side, right breast 2-3 o'clock in the right breast 2.7 cm from the nipple 0.9 cm nodule US biopsy found IDC grade III, ER/[place]-, Her 2 negative.    Due to the small size of the lesion, her prior poor tolerance to chemotherapy, upfront surgical lumpectomy is offered 1/2020 found   IDC 1 cm, grade 2, no LVI, no LN removed due to prior LND surgery, pT1bNx.  Discuss with radiation oncologist about the feasibility of second postlumpectomy radiation considering her prior right was offered in 2003. Dr. Segura feel very likely she will be able to get RT again.  Will do the referral.     We discussed the role of adjuvant systemic chemotherapy for triple negative breast cancer in postop setting.  Since her tumor is greater than 5 mm, would recommend her to consider some form of systemic adjuvant chemotherapy.  Considering her prior poor tolerance to AC, recommend her to consider weekly Taxol.  We discussed the chemotherapy side effect, which include but not limited to GI toxicity nausea vomiting, lower immunity and bleeding risk from cytopenia, neurotoxicity, cardica toxicity,  infusion related reaction, mortality, etc.  She made informed decision not to proceed with chemo.     She is open to meet with Rad-Onc.     2. Elevation of her tumor marker is fluctuating with unclear etiology.   We discuss the draw back of this test, and what is the proper way to randolph it and ASCO guideline on not doing it.   She is aware of it, yet still wants to do it.   Now looking back, this maybe related to her breast cancer recurrence.   Will check it again at post op setting to  "see whether it is still elevated.     Total time is about 25 minutes, all spent in counseling regarding her third time breast cancer diagnosis, clinical stage, modality treatment plan,      Oncology Rooming Note    February 10, 2020 2:53 PM   Milly Loaiza is a 69 year old female who presents for:    Chief Complaint   Patient presents with     Oncology Clinic Visit     Recheck Post surgery Personal history of malignant neoplasm of breast      Initial Vitals: BP (!) 148/86 (BP Location: Right arm, Patient Position: Sitting, Cuff Size: Adult Regular)   Pulse 79   Temp 98.4  F (36.9  C) (Tympanic)   Resp 20   Ht 1.626 m (5' 4\")   Wt 72.3 kg (159 lb 8 oz)   LMP  (LMP Unknown)   SpO2 96%   BMI 27.38 kg/m    Estimated body mass index is 27.38 kg/m  as calculated from the following:    Height as of this encounter: 1.626 m (5' 4\").    Weight as of this encounter: 72.3 kg (159 lb 8 oz). Body surface area is 1.81 meters squared.  No Pain (0) Comment: Data Unavailable   No LMP recorded (lmp unknown). Patient is postmenopausal.  Allergies reviewed: Yes  Medications reviewed: Yes    Medications: Medication refills not needed today.  Pharmacy name entered into [employer]: [facility] [place], [place] - [address] [facility]    Clinical concerns: Recheck Post surgery Personal history of malignant neoplasm of breast.       Annamaria Pradhan CMA                Again, thank you for allowing me to participate in the care of your patient.        Sincerely,        Jacinta Lerma MD, MD    "

## 2020-02-10 NOTE — PROGRESS NOTES
"Oncology Rooming Note    February 10, 2020 2:53 PM   Milly Loaiza is a 69 year old female who presents for:    Chief Complaint   Patient presents with     Oncology Clinic Visit     Recheck Post surgery Personal history of malignant neoplasm of breast      Initial Vitals: BP (!) 148/86 (BP Location: Right arm, Patient Position: Sitting, Cuff Size: Adult Regular)   Pulse 79   Temp 98.4  F (36.9  C) (Tympanic)   Resp 20   Ht 1.626 m (5' 4\")   Wt 72.3 kg (159 lb 8 oz)   LMP  (LMP Unknown)   SpO2 96%   BMI 27.38 kg/m   Estimated body mass index is 27.38 kg/m  as calculated from the following:    Height as of this encounter: 1.626 m (5' 4\").    Weight as of this encounter: 72.3 kg (159 lb 8 oz). Body surface area is 1.81 meters squared.  No Pain (0) Comment: Data Unavailable   No LMP recorded (lmp unknown). Patient is postmenopausal.  Allergies reviewed: Yes  Medications reviewed: Yes    Medications: Medication refills not needed today.  Pharmacy name entered into [employer]: [facility] [place], [place] - [address]    Clinical concerns: Recheck Post surgery Personal history of malignant neoplasm of breast.       Annamaria Pradhan CMA              "

## 2020-02-11 ENCOUNTER — OFFICE VISIT (OUTPATIENT)
Dept: SURGERY | Facility: CLINIC | Age: 70
End: 2020-02-11
Payer: COMMERCIAL

## 2020-02-11 VITALS
BODY MASS INDEX: 27.21 KG/M2 | TEMPERATURE: 98.3 F | HEIGHT: 64 IN | DIASTOLIC BLOOD PRESSURE: 77 MMHG | SYSTOLIC BLOOD PRESSURE: 147 MMHG | WEIGHT: 159.39 LBS | HEART RATE: 84 BPM

## 2020-02-11 DIAGNOSIS — C50.911 INVASIVE DUCTAL CARCINOMA OF BREAST, RIGHT (H): Primary | ICD-10-CM

## 2020-02-11 PROCEDURE — 99024 POSTOP FOLLOW-UP VISIT: CPT | Performed by: SURGERY

## 2020-02-11 ASSESSMENT — MIFFLIN-ST. JEOR: SCORE: 1223

## 2020-02-11 NOTE — LETTER
"    2/11/2020         RE: Milly Loaiza  [address] [facility] [address]        Dear Colleague,    Thank you for referring your patient, Milly Loaiza, to the [facility]. Please see a copy of my visit note below.    General Surgery Post Op    Pt returns for follow up visit s/p wire localized lumpectomy on 1/27/2020.    Patient has been doing well, tolerating diet. Bowels moving well. Pain controlled. No issues with wound healing/redness/drainage. No fevers.      Physical exam: Vitals: BP (!) 147/77 (BP Location: Left arm, Patient Position: Sitting, Cuff Size: Adult Regular)   Pulse 84   Temp 98.3  F (36.8  C) (Tympanic)   Ht 1.626 m (5' 4\")   Wt 72.3 kg (159 lb 6.3 oz)   LMP  (LMP Unknown)   BMI 27.36 kg/m     BMI= Body mass index is 27.36 kg/m .    Exam:  Constitutional: healthy, alert and no distress  Cardiovascular: negative  Respiratory: negative  Gastrointestinal: Abdomen soft, non-tender. BS normal. No masses, organomegaly  Incision C/D/I.  Examined with Jacey Resendiz MA.      Path:  SPECIMEN(S):   A: Right breast lumpectomy   B: Right breast, medial margin   C: Right breast, posterior margin   D: Right breast, lateral margin     FINAL DIAGNOSIS:   A.  Specimen, Procedure, Side:  Right breast, lumpectomy with inked   designated margins.     Tumor Site:  By imaging studies 3:00 position, 3 cm from nipple.     Specimen Integrity:  Intact     Specimen size:  See gross description     Histologic Type:  Invasive ductal carcinoma     Size of invasive ductal carcinoma:  10 x 9 x 8 mm     [place] grade: 2 (out of 3)     S[place] score:  6 (out of 9) (tubules-3; nuclear pleomorphism-2;   mitoses-1)     Angiolymphatic invasion:  Not identified     Tumor focality:  Unifocal     Extent of tumor:  Not applicable     Associated in situ carcinoma:  Not identified     Size of ductal carcinoma in situ:  Not applicable     Margins:   - Invasive ductal carcinoma:  Uninvolved; invasive " ductal carcinoma 2 mm   from the nearest superior margin, 3   mm from the nearest posterior margin and 3 mm from the nearest inferior   margin, and d7 mm from the other   margins.     Ductal carcinoma in situ:  Not applicable     Lymph node status:  No lymph nodes removed     Pathologic staging (pTNM):  pT1b (criterion tumor >5 mm and d10 mm in   greatest dimension)  pN and pM - Not   applicable     Assessment:     ICD-10-CM    1. Invasive ductal carcinoma of breast, right (H) C50.911      Plan: Mrs. Loaiza returns after lumpectomy for recurrent right breast cancer.  We discussed her pathology, margins which were negative.  She has received a referral to radiation oncology.  [place] lymph node not performed as she had previous axillary lymphadenectomy.  She was encouraged to proceed with chemotherapy per oncology recommendation, however, again patient refuses at this time.  I discussed NCCN guidelines and risk of recurrence.  Patient agrees to think about it further.  I will see her back in 3 months for a clinical breast exam.    Gallo King DO on 2/11/2020 at 10:33 AM        Again, thank you for allowing me to participate in the care of your patient.        Sincerely,        Gallo King DO

## 2020-02-11 NOTE — PROGRESS NOTES
"General Surgery Post Op    Pt returns for follow up visit s/p wire localized lumpectomy on 1/27/2020.    Patient has been doing well, tolerating diet. Bowels moving well. Pain controlled. No issues with wound healing/redness/drainage. No fevers.      Physical exam: Vitals: BP (!) 147/77 (BP Location: Left arm, Patient Position: Sitting, Cuff Size: Adult Regular)   Pulse 84   Temp 98.3  F (36.8  C) (Tympanic)   Ht 1.626 m (5' 4\")   Wt 72.3 kg (159 lb 6.3 oz)   LMP  (LMP Unknown)   BMI 27.36 kg/m    BMI= Body mass index is 27.36 kg/m .    Exam:  Constitutional: healthy, alert and no distress  Cardiovascular: negative  Respiratory: negative  Gastrointestinal: Abdomen soft, non-tender. BS normal. No masses, organomegaly  Incision C/D/I.  Examined with Jacey Resendiz MA.      Path:  SPECIMEN(S):   A: Right breast lumpectomy   B: Right breast, medial margin   C: Right breast, posterior margin   D: Right breast, lateral margin     FINAL DIAGNOSIS:   A.  Specimen, Procedure, Side:  Right breast, lumpectomy with inked   designated margins.     Tumor Site:  By imaging studies 3:00 position, 3 cm from nipple.     Specimen Integrity:  Intact     Specimen size:  See gross description     Histologic Type:  Invasive ductal carcinoma     Size of invasive ductal carcinoma:  10 x 9 x 8 mm     [place] grade: 2 (out of 3)     Shelby score:  6 (out of 9) (tubules-3; nuclear pleomorphism-2;   mitoses-1)     Angiolymphatic invasion:  Not identified     Tumor focality:  Unifocal     Extent of tumor:  Not applicable     Associated in situ carcinoma:  Not identified     Size of ductal carcinoma in situ:  Not applicable     Margins:   - Invasive ductal carcinoma:  Uninvolved; invasive ductal carcinoma 2 mm   from the nearest superior margin, 3   mm from the nearest posterior margin and 3 mm from the nearest inferior   margin, and d7 mm from the other   margins.     Ductal carcinoma in situ:  Not applicable     Lymph node status:  No " lymph nodes removed     Pathologic staging (pTNM):  pT1b (criterion tumor >5 mm and d10 mm in   greatest dimension)  pN and pM - Not   applicable     Assessment:     ICD-10-CM    1. Invasive ductal carcinoma of breast, right (H) C50.911      Plan: Mrs. Loaiza returns after lumpectomy for recurrent right breast cancer.  We discussed her pathology, margins which were negative.  She has received a referral to radiation oncology.  [place] lymph node not performed as she had previous axillary lymphadenectomy.  She was encouraged to proceed with chemotherapy per oncology recommendation, however, again patient refuses at this time.  I discussed NCCN guidelines and risk of recurrence.  Patient agrees to think about it further.  I will see her back in 3 months for a clinical breast exam.    Gallo King, DO on 2/11/2020 at 10:33 AM

## 2020-02-11 NOTE — NURSING NOTE
"Initial BP (!) 155/76 (BP Location: Left arm, Patient Position: Sitting, Cuff Size: Adult Regular)   Pulse 95   Temp 98.3  F (36.8  C) (Tympanic)   Ht 1.626 m (5' 4\")   Wt 72.3 kg (159 lb 6.3 oz)   LMP  (LMP Unknown)   BMI 27.36 kg/m   Estimated body mass index is 27.36 kg/m  as calculated from the following:    Height as of this encounter: 1.626 m (5' 4\").    Weight as of this encounter: 72.3 kg (159 lb 6.3 oz). .    Jacey Saini MA    "

## 2020-02-20 NOTE — PROGRESS NOTES
Department of Radiation Oncology  Radiation Therapy Center  [facility]  [address], [address]  [place] [address]  [phone]       Consultation Note    Name: Milly Loaiza MRN: [id number]   : 1950   Date of Service: 2020  Referring: Dr. King and Dr. Lerma     Reason for consultation: Invasive ductal carcinoma of the right breast, recurrent, status post lumpectomy.  Final pathology demonstrated IDC, grade 2, no LVSI, negative margins, triple negative, 1 cm in size, pathologic T1bNX.  Evaluate potential role of adjuvant radiation therapy in the setting of reirradiation.     History of Present Illness   Ms. Loaiza is a 69 year old female invasive ductal carcinoma of the right breast, recurrent, status post lumpectomy.  Final pathology demonstrated IDC, grade 2, no LVSI, negative margins, triple negative, 1 cm in size, pathologic T1bNX.  Evaluate potential role of adjuvant radiation therapy in the setting of reirradiation.     Patient's oncologic history dates to  when she was diagnosed with right breast invasive ductal carcinoma.  She underwent lumpectomy on 2004.  Final pathology demonstrated IDC, 2.5 cm, no LVSI, negative margins, 0 out of 11 sentinel lymph nodes, triple negative breast cancer.  The patient subsequent completed adjuvant systemic therapy with AC and subsequent postoperative radiation therapy to the right breast.  She received a total dose of 60.4 Gy in 33 treatments, completing radiation in 2004.  The patient had recurrent right breast high-grade DCIS, diagnosed in 2009.  Subsequent wide local excision on 3/23/2009 demonstrated no evidence of residual DCIS or malignancy.  On 2019 the patient underwent right breast biopsy for an abnormality detected by imaging.  On pathology demonstrated IDC, grade 3, no LVSI, ER negative, MN negative, HER-2 negative.  The patient met with our surgeon colleagues (Dr. King) who  discussed mastectomy with patient given her prior multiple recurrences.  The patient was however adamant about proceeding with mastectomy and wished to proceed with lumpectomy at this time.  On 1/27/2012 the patient underwent right breast lumpectomy.  Final pathology demonstrated IDC, grade 2, no LVSI, negative margins, 1 cm in size, previous biopsy clip was obtained, pathologic T1bNX.  The patient met with Dr. Lerma of medical oncology who discussed consideration of adjuvant systemic therapy.  The patient declined systemic therapy at this time.    Today the patient is overall doing fairly well.  She denies any significant breast pain.  No extremity lymphedema.  No issues with range of motion.  No history of connective tissue disorder.  No pacemaker.  She states that she overall tolerated her previous course of radiation well. She did have mild fibrosis after treatment which she feel has essentially resolved.    Past Medical History:   Past Medical History:   Diagnosis Date     Acute reaction to stress 10/9/2019     Malignant neoplasm of breast (female), unspecified site        Past Surgical History:   Past Surgical History:   Procedure Laterality Date     LUMPECTOMY BREAST Right 1/27/2020    Procedure: Right Wire Localized Breast Lumpectomy(wire placement at 8:30);  Surgeon: Gallo King DO;  Location: [facility] OR     SURGICAL HISTORY OF -   2003    Cervical biopsy     SURGICAL HISTORY OF -   1979    Tubal ligation     SURGICAL HISTORY OF -   2/2/2004    Right mastectomy     SURGICAL HISTORY OF -   3-23-09    Needle-guided left breast biopsy.       Chemotherapy History:  Per HPI    Radiation History:  She was diagnosed with right breast cancer initially in 2004.  Her initial stage was T2 N0 M0.  The patient underwent lumpectomy and axillary lymph node dissection followed by three cycles of AC chemotherapy.  She received the postoperative radiation therapy in our clinic with a total dose of 6040 cGy in 33  treatments.  She completed her therapy in August 2004    Pregnant: No  Implanted Cardiac Devices: No    Medications:  Current Outpatient Medications   Medication     acetaminophen (TYLENOL) 325 MG tablet     aspirin 325 MG tablet     calcium carbonate (OS-LUCIAN 500 MG [place]. CA) 500 MG tablet     DAILY MULTI VITAMIN/MINERALS OR     lisinopril (PRINIVIL/ZESTRIL) 20 MG tablet     No current facility-administered medications for this visit.          Allergies:     Allergies   Allergen Reactions     Coconut Fatty Acids Itching and Rash     Purple blotches     Penicillins Rash     Sulfa Drugs Rash     Purple blotches         Family History:  Family History   Problem Relation Age of Onset     Alcohol/Drug Mother      Respiratory Mother         emphazyma     Cerebrovascular Disease Maternal Grandmother      Osteoporosis Maternal Grandmother      Breast Cancer Paternal Grandmother      Depression Sister      Diabetes Other      Cerebrovascular Disease Other      Cancer Other         lymphademia     Respiratory Other         emphazyma     Thyroid Disease Sister        Review of Systems   A 10-point review of systems was performed. Pertinent findings are noted in the HPI.    Physical Exam   ECOG Status: 1    Vitals:  LMP  (LMP Unknown)     Gen: Alert, in NAD  Head: NC/AT  Eyes: PERRL, EOMI, sclera anicteric  Ears: No external auricular lesions  Nose/sinus: No rhinorrhea or epistaxis  Oral cavity/oropharynx: MMM, no visible oral cavity lesions, FOM and BOT are soft to palpation  Neck: Full ROM, supple, no palpable adenopathy  Pulm: No wheezing, stridor or respiratory distress  CV: Extremities are warm and well-perfused, no cyanosis, no pedal edema  Abdominal: Normal bowel sounds, soft, nontender, no masses  Musculoskeletal: Normal bulk and tone  Skin: Normal color and turgor  Neuro: A/Ox3, CN II-XII intact, normal gait    Imaging/Path/Labs   Imaging:   Per HPI    Path:   2/2/2004  INTERPRETATION:  SUPPLEMENTAL REPORT:          IMMUNOHISTOCHEMICAL ANALYSIS FOR ESTROGEN/PROGESTERONE RECEPTORS         ESTROGEN RECEPTORS:          Negative         PROGESTERONE RECEPTORS:     Negative    Positive values indicate greater than 5% of the tumor nuclei stain  positive for the estrogen/progesterone  receptors.      IMMUNOHISTOCHEMICAL STAIN FOR HER2 PROTEIN OVEREXPRESSION    TEST RESULT:         DAKO HERCEPTEST SCORE:          2+       HER2 PROTEIN OVEREXPRESSION:     Weak positive    The Herceptin test will be reflex for FISH confirmation.    RDD/  2-9-04          __________________________________________      ORIGINAL REPORT:    SPECIMEN(S):  A: Right, axillary node dissection  B: Breast mass, right    FINAL DIAGNOSIS:  A.          Right axillary lymph nodes (11 total)  Negative for  malignancy.  B.          Right breast mass (oriented with medial suture)  1.          Infiltrating ductal carcinoma (modified SPR Grade III/III).  2.          Tumor size 2.5 cm.  3.          No angiolymphatic invasion identified.  4.          Clear margins of resection (tumor within 3 mm of deep  margin).  5.          No in situ malignant component identified.  6.          Surrounding breast tissue fibrofatty, without significant  proliferative change      1/13/2009  SPECIMEN(S):  Right stereotactic breast needle biopsy    FINAL DIAGNOSIS:  Breast, right, 3:00, 10 cm from nipple, stereotactic biopsy - Ductal  carcinoma in situ, high nuclear grade, fibrosis with chronic  inflammation, no evidence of invasive malignancy, rare  microcalcification identified.        3/23/2009  SPECIMEN(S):  A: Right breast tissue  B: Breast biopsy with margins, right    FINAL DIAGNOSIS:  A.  Breast, right. tissue, excision:       - Fibrosis, lobular atrophy, fat necrosis and previous procedure  site changes (see comment)       - Patchy areas of mild acute inflammation       - Skeletal muscle with no histopathologic changes    B.  Breast, right, excisional biopsy with margins:       -  No evidence of malignancy.       - Lobular atrophy and fibrosis.      12/2/19  SPECIMEN(S):   Breast needle biopsy, right     FINAL DIAGNOSIS:   Breast, right, ultrasound-guided needle core biopsy:   - Invasive ductal carcinoma:        - [place] grade: III/III ; [place] score: 8/9 (tubules:3;   nuclear:3; mitosis:2)        - Angiolymphatic invasion not identified in the planes examined.        - Ductal carcinoma in-situ not identified in the planes examined.        - ER: Negative (no staining in tumor nuclei)        - IL: negative (no staining in tumor nuclei)       1/27/20    SPECIMEN(S):   A: Right breast lumpectomy   B: Right breast, medial margin   C: Right breast, posterior margin   D: Right breast, lateral margin     FINAL DIAGNOSIS:   A.  Specimen, Procedure, Side:  Right breast, lumpectomy with inked   designated margins.     Tumor Site:  By imaging studies 3:00 position, 3 cm from nipple.     Specimen Integrity:  Intact     Specimen size:  See gross description     Histologic Type:  Invasive ductal carcinoma     Size of invasive ductal carcinoma:  10 x 9 x 8 mm     [place] grade: 2 (out of 3)     [place] score:  6 (out of 9) (tubules-3; nuclear pleomorphism-2;   mitoses-1)     Angiolymphatic invasion:  Not identified     Tumor focality:  Unifocal     Extent of tumor:  Not applicable     Associated in situ carcinoma:  Not identified     Size of ductal carcinoma in situ:  Not applicable     Margins:   - Invasive ductal carcinoma:  Uninvolved; invasive ductal carcinoma 2 mm   from the nearest superior margin, 3   mm from the nearest posterior margin and 3 mm from the nearest inferior   margin, and d7 mm from the other   margins.     Ductal carcinoma in situ:  Not applicable     Lymph node status:  No lymph nodes removed     Pathologic staging (pTNM):  pT1b (criterion tumor >5 mm and d10 mm in   greatest dimension)  pN and pM - Not   applicable     Estrogen and progesterone receptors:  Both negative  on recent right breast    mass biopsy, [id number], on   12/02/2019.     HER2 FISH:  Reported as negative FISH and IHC HER2 on previous biopsy   [id number] on 12/02/2019.     Additional findings:   - Evidence of previous biopsy site with coiled biopsy clip.   - Focal microcalcifications in benign breast tissue.     B.  Right breast, additional medial margin, excision:   - Negative for atypia and malignancy.     C.  Right breast, additional posterior margin, excision:   - Negative for atypia and malignancy.   - Focal fibrocystic change.     D.  Right breast, additional lateral margin, excision:   - Negative for atypia and malignancy.   - Focal parenchymal calcification.   - Focal minute medical calcification in an artery.     Assessment    Ms. Loaiza is a 69 year old female invasive ductal carcinoma of the right breast, recurrent, status post lumpectomy.  Final pathology demonstrated IDC, grade 2, no LVSI, negative margins, triple negative, 1 cm in size, pathologic T1bNX.  Evaluate potential role of adjuvant radiation therapy in the setting of reirradiation.     Plan     We discussed the role of adjuvant radiation therapy in all comers. We discussed that adjuvant radiation therapy to the whole breast following lumpectomy is the standard of care. The most recent update of the EBCTCG metaanalysis for early stage breast cancer showed that for patients with pathologically node negative disease, radiotherapy reduces the 5 year risk of any recurrence from 31% to 15% (Lancet, 2011).  This translated into a 3.3% absolute survival benefit at 15 years for all-comers.      However, this benefit is reduced for older patients. In the CALGB 9343 (ANA Joe 2013) trial, patients over age 70 with hormone positive invasive ductal carcinoma after lumpectomy were treated with tamoxifen alone versus tamoxifen plus radiation. They experienced locoregional recurrence of 2% versus 10% at 10 years, but there was no difference in overall  survival. These results are similar to those seen in patients over 65 with hormone positive early stage cancers and found a reduction in recurrence at 5 years from 4.1% to 1.3% with radiation. Mrs. Loaiza, however, does not meet eligibility for observation given her history of triple negative disease. Additionally, she has multiple recurrent disease, which suggests a more aggressive biology of tumor.     Ideally, mastectomy would be have been performed, given multiple recurrent disease in setting of prior radiation. However, the patient was adamant about avoiding mastectomy and decision was made to perform lumpectomy with consideration of re-irradiation despite increase risk of side effect.     Preliminary reports of partial breast reirradiation, from RTOG 1014, are suggest low toxicity and excellent local control. Given the time interval from her prior course, I feel it is reasonable to proceed with re-irradiation. I would try to limit toxicity by reducing target volume size. I discussed I would consider 4005 cGy in 15 fractions +/- boost.      We also discussed the logistics of treatment planning and delivery in detail with the patient. We furthermore discussed side effects, including short term risks of fatigue and skin reaction, and long term risks of pneumonitis, lung fibrosis, soft tissue fibrosis, skin changes, breast contour changes, rib fractures, cardiac damage, secondary cancers, lymphedema, and thyroid dysfunction.  We described the use of 3D-conformal radiotherapy to minimize dose to the normal tissues, while adequately covering the target tissues, and the ability of this technique to decrease potential for toxicity.     With regards to sparing of the heart, we discussed the use of deep inspiration breath hold for treatment planning and delivery.  This method has been shown to significantly reduce dose to the lung and heart as compared to treatment with free breathing (Gabo et al, AJCO, 2012).  We  discussed that this will involve 3D-conformal treatment planning, with contouring of the heart, and that limiting heart dose will be a high priority for treatment planning, as will dose to the contralateral breast and lung.      The risks and benefits of radiation therapy were discussed in detail with the patient. The patient expressed an understanding of these risks and is in agreement to undergo radiation treatment at this time.     We will plan to schedule CT simulation accordingly with plan to begin RT 7-10 days thereafter. Consent obtained.        Evert Segura MD  Department of Radiation Oncology  [facility]

## 2020-02-24 ENCOUNTER — OFFICE VISIT (OUTPATIENT)
Dept: RADIATION THERAPY | Facility: OUTPATIENT CENTER | Age: 70
End: 2020-02-24
Attending: INTERNAL MEDICINE
Payer: COMMERCIAL

## 2020-02-24 VITALS
SYSTOLIC BLOOD PRESSURE: 165 MMHG | HEART RATE: 87 BPM | DIASTOLIC BLOOD PRESSURE: 89 MMHG | BODY MASS INDEX: 27.12 KG/M2 | RESPIRATION RATE: 16 BRPM | WEIGHT: 158 LBS

## 2020-02-24 DIAGNOSIS — C50.911 MALIGNANT NEOPLASM OF RIGHT BREAST IN FEMALE, ESTROGEN RECEPTOR NEGATIVE, UNSPECIFIED SITE OF BREAST (H): Primary | ICD-10-CM

## 2020-02-24 DIAGNOSIS — Z17.1 MALIGNANT NEOPLASM OF RIGHT BREAST IN FEMALE, ESTROGEN RECEPTOR NEGATIVE, UNSPECIFIED SITE OF BREAST (H): Primary | ICD-10-CM

## 2020-02-24 ASSESSMENT — PAIN SCALES - GENERAL: PAINLEVEL: NO PAIN (0)

## 2020-02-24 NOTE — NURSING NOTE
REASON FOR APPOINTMENT   Recurrent right breast cancer. Original diagnosis 2004. Treated with chemo and 33fx rt. Has had several surgeries. Now comes for consideration of more radiation therapy to the right breast. See epic.   PERSONAL HISTORY OF CANCER   Previous Cancer ? no   Prior Radiation ? 2004- right breast RT, 33 fractions. No other RT.   Prior Chemotherapy ? With Dr. Lerma, 2004     REFERRALS NEEDED  Not at this time    VITALS  BP (!) 165/89 (BP Location: Left arm, Cuff Size: Adult Large)   Pulse 87   Resp 16   Wt 71.7 kg (158 lb)   LMP  (LMP Unknown)   BMI 27.12 kg/m      PACEMAKER/IMPLANTED CARDIAC DEVICE : No    PAIN  Denies    PSYCHOSOCIAL  Marital Status:   Patient lives in [facility] with spouse Ronald.  Working status: retired  Do you feel safe in your home? Yes    REVIEW OF SYSTEMS  Skin: negative  Eyes: negative  Ears/Nose/Throat: negative  Respiratory: No shortness of breath, dyspnea on exertion, cough, or hemoptysis  Cardiovascular: negative  Gastrointestinal: negative  Genitourinary: negative  Musculoskeletal: some general arthritic pains  Neurologic: negative  Psychiatric: negative  Hematologic/Lymphatic/Immunologic: negative  Endocrine: negative    WOMEN ONLY  Any chance you may be pregnant: No    Radiation Oncology Patient Teaching    Current Concern: if she can have more radiation dose    Person involved with teaching: Patient and   Patient asked Questions: Yes  Patient was cooperative: Yes  Patient was receptive (willing to accept information given): Yes    Education Assessment  Comprehension ability: High  Knowledge level: High  Factors affecting teaching: None    Education Materials Given  Caring for Your Skin During Radiation ...  Disease Specific Booklet  Eating Hints  Diet and Nutrition Information    Educational Topics Discussed  Side effects, Medications, Pain management, Activity, Nutrition and Community resources    Response To Teaching  Verbalizes  understanding    GYN Only  Vaginal Dilator-given and educated: N/A    Do you have an advanced directive or living will? no  Are you DNR/DNI? no

## 2020-02-24 NOTE — LETTER
2020      RE: Milly Loaiza  [address] [facility] [address]          Department of Radiation Oncology  Radiation Therapy Center  [facility]  [address], [address]  [place] [address] [phone]       Consultation Note    Name: Milly Loaiza MRN: [id number]   : 1950   Date of Service: 2020  Referring: Dr. King and Dr. Lerma     Reason for consultation: Invasive ductal carcinoma of the right breast, recurrent, status post lumpectomy.  Final pathology demonstrated IDC, grade 2, no LVSI, negative margins, triple negative, 1 cm in size, pathologic T1bNX.  Evaluate potential role of adjuvant radiation therapy in the setting of reirradiation.     History of Present Illness   Ms. Loaiza is a 69 year old female invasive ductal carcinoma of the right breast, recurrent, status post lumpectomy.  Final pathology demonstrated IDC, grade 2, no LVSI, negative margins, triple negative, 1 cm in size, pathologic T1bNX.  Evaluate potential role of adjuvant radiation therapy in the setting of reirradiation.     Patient's oncologic history dates to  when she was diagnosed with right breast invasive ductal carcinoma.  She underwent lumpectomy on 2004.  Final pathology demonstrated IDC, 2.5 cm, no LVSI, negative margins, 0 out of 11 sentinel lymph nodes, triple negative breast cancer.  The patient subsequent completed adjuvant systemic therapy with AC and subsequent postoperative radiation therapy to the right breast.  She received a total dose of 60.4 Gy in 33 treatments, completing radiation in 2004.  The patient had recurrent right breast high-grade DCIS, diagnosed in 2009.  Subsequent wide local excision on 3/23/2009 demonstrated no evidence of residual DCIS or malignancy.  On 2019 the patient underwent right breast biopsy for an abnormality detected by imaging.  On pathology demonstrated IDC, grade 3, no LVSI, ER negative, WI  negative, HER-2 negative.  The patient met with our surgeon colleagues (Dr. King) who discussed mastectomy with patient given her prior multiple recurrences.  The patient was however adamant about proceeding with mastectomy and wished to proceed with lumpectomy at this time.  On 1/27/2012 the patient underwent right breast lumpectomy.  Final pathology demonstrated IDC, grade 2, no LVSI, negative margins, 1 cm in size, previous biopsy clip was obtained, pathologic T1bNX.  The patient met with Dr. Lerma of medical oncology who discussed consideration of adjuvant systemic therapy.  The patient declined systemic therapy at this time.    Today the patient is overall doing fairly well.  She denies any significant breast pain.  No extremity lymphedema.  No issues with range of motion.  No history of connective tissue disorder.  No pacemaker.  She states that she overall tolerated her previous course of radiation well. She did have mild fibrosis after treatment which she feel has essentially resolved.    Past Medical History:   Past Medical History:   Diagnosis Date     Acute reaction to stress 10/9/2019     Malignant neoplasm of breast (female), unspecified site        Past Surgical History:   Past Surgical History:   Procedure Laterality Date     LUMPECTOMY BREAST Right 1/27/2020    Procedure: Right Wire Localized Breast Lumpectomy(wire placement at 8:30);  Surgeon: Gallo King, ;  Location: [facility] OR     SURGICAL HISTORY OF -   2003    Cervical biopsy     SURGICAL HISTORY OF -   1979    Tubal ligation     SURGICAL HISTORY OF -   2/2/2004    Right mastectomy     SURGICAL HISTORY OF -   3-23-09    Needle-guided left breast biopsy.       Chemotherapy History:  Per HPI    Radiation History:  She was diagnosed with right breast cancer initially in 2004.  Her initial stage was T2 N0 M0.  The patient underwent lumpectomy and axillary lymph node dissection followed by three cycles of AC chemotherapy.  She received the  postoperative radiation therapy in our clinic with a total dose of 6040 cGy in 33 treatments.  She completed her therapy in August 2004    Pregnant: No  Implanted Cardiac Devices: No    Medications:  Current Outpatient Medications   Medication     acetaminophen (TYLENOL) 325 MG tablet     aspirin 325 MG tablet     calcium carbonate (OS-LUCIAN 500 MG Squaxin. CA) 500 MG tablet     DAILY MULTI VITAMIN/MINERALS OR     lisinopril (PRINIVIL/ZESTRIL) 20 MG tablet     No current facility-administered medications for this visit.          Allergies:     Allergies   Allergen Reactions     Coconut Fatty Acids Itching and Rash     Purple blotches     Penicillins Rash     Sulfa Drugs Rash     Purple blotches         Family History:  Family History   Problem Relation Age of Onset     Alcohol/Drug Mother      Respiratory Mother         emphazyma     Cerebrovascular Disease Maternal Grandmother      Osteoporosis Maternal Grandmother      Breast Cancer Paternal Grandmother      Depression Sister      Diabetes Other      Cerebrovascular Disease Other      Cancer Other         lymphademia     Respiratory Other         emphazyma     Thyroid Disease Sister        Review of Systems   A 10-point review of systems was performed. Pertinent findings are noted in the HPI.    Physical Exam   ECOG Status: 1    Vitals:  LMP  (LMP Unknown)     Gen: Alert, in NAD  Head: NC/AT  Eyes: PERRL, EOMI, sclera anicteric  Ears: No external auricular lesions  Nose/sinus: No rhinorrhea or epistaxis  Oral cavity/oropharynx: MMM, no visible oral cavity lesions, FOM and BOT are soft to palpation  Neck: Full ROM, supple, no palpable adenopathy  Pulm: No wheezing, stridor or respiratory distress  CV: Extremities are warm and well-perfused, no cyanosis, no pedal edema  Abdominal: Normal bowel sounds, soft, nontender, no masses  Musculoskeletal: Normal bulk and tone  Skin: Normal color and turgor  Neuro: A/Ox3, CN II-XII intact, normal gait    Imaging/Path/Labs    Imaging:   Per HPI    Path:   2/2/2004  INTERPRETATION:  SUPPLEMENTAL REPORT:         IMMUNOHISTOCHEMICAL ANALYSIS FOR ESTROGEN/PROGESTERONE RECEPTORS         ESTROGEN RECEPTORS:          Negative         PROGESTERONE RECEPTORS:     Negative    Positive values indicate greater than 5% of the tumor nuclei stain  positive for the estrogen/progesterone  receptors.      IMMUNOHISTOCHEMICAL STAIN FOR HER2 PROTEIN OVEREXPRESSION    TEST RESULT:         DAKO HERCEPTEST SCORE:          2+       HER2 PROTEIN OVEREXPRESSION:     Weak positive    The Herceptin test will be reflex for FISH confirmation.    VAMSI/  2-9-04          __________________________________________      ORIGINAL REPORT:    SPECIMEN(S):  A: Right, axillary node dissection  B: Breast mass, right    FINAL DIAGNOSIS:  A.          Right axillary lymph nodes (11 total)  Negative for  malignancy.  B.          Right breast mass (oriented with medial suture)  1.          Infiltrating ductal carcinoma (modified SPR Grade III/III).  2.          Tumor size 2.5 cm.  3.          No angiolymphatic invasion identified.  4.          Clear margins of resection (tumor within 3 mm of deep  margin).  5.          No in situ malignant component identified.  6.          Surrounding breast tissue fibrofatty, without significant  proliferative change      1/13/2009  SPECIMEN(S):  Right stereotactic breast needle biopsy    FINAL DIAGNOSIS:  Breast, right, 3:00, 10 cm from nipple, stereotactic biopsy - Ductal  carcinoma in situ, high nuclear grade, fibrosis with chronic  inflammation, no evidence of invasive malignancy, rare  microcalcification identified.        3/23/2009  SPECIMEN(S):  A: Right breast tissue  B: Breast biopsy with margins, right    FINAL DIAGNOSIS:  A.  Breast, right. tissue, excision:       - Fibrosis, lobular atrophy, fat necrosis and previous procedure  site changes (see comment)       - Patchy areas of mild acute inflammation       - Skeletal muscle with no  histopathologic changes    B.  Breast, right, excisional biopsy with margins:       - No evidence of malignancy.       - Lobular atrophy and fibrosis.      12/2/19  SPECIMEN(S):   Breast needle biopsy, right     FINAL DIAGNOSIS:   Breast, right, ultrasound-guided needle core biopsy:   - Invasive ductal carcinoma:        - Shelby grade: III/III ; Wild Horse score: 8/9 (tubules:3;   nuclear:3; mitosis:2)        - Angiolymphatic invasion not identified in the planes examined.        - Ductal carcinoma in-situ not identified in the planes examined.        - ER: Negative (no staining in tumor nuclei)        - SC: negative (no staining in tumor nuclei)       1/27/20    SPECIMEN(S):   A: Right breast lumpectomy   B: Right breast, medial margin   C: Right breast, posterior margin   D: Right breast, lateral margin     FINAL DIAGNOSIS:   A.  Specimen, Procedure, Side:  Right breast, lumpectomy with inked   designated margins.     Tumor Site:  By imaging studies 3:00 position, 3 cm from nipple.     Specimen Integrity:  Intact     Specimen size:  See gross description     Histologic Type:  Invasive ductal carcinoma     Size of invasive ductal carcinoma:  10 x 9 x 8 mm     Wild Horse grade: 2 (out of 3)     Wild Horse score:  6 (out of 9) (tubules-3; nuclear pleomorphism-2;   mitoses-1)     Angiolymphatic invasion:  Not identified     Tumor focality:  Unifocal     Extent of tumor:  Not applicable     Associated in situ carcinoma:  Not identified     Size of ductal carcinoma in situ:  Not applicable     Margins:   - Invasive ductal carcinoma:  Uninvolved; invasive ductal carcinoma 2 mm   from the nearest superior margin, 3   mm from the nearest posterior margin and 3 mm from the nearest inferior   margin, and d7 mm from the other   margins.     Ductal carcinoma in situ:  Not applicable     Lymph node status:  No lymph nodes removed     Pathologic staging (pTNM):  pT1b (criterion tumor >5 mm and d10 mm in   greatest dimension)   pN and pM - Not   applicable     Estrogen and progesterone receptors:  Both negative on recent right breast    mass biopsy, [id number], on   12/02/2019.     HER2 FISH:  Reported as negative FISH and IHC HER2 on previous biopsy   [id number] on 12/02/2019.     Additional findings:   - Evidence of previous biopsy site with coiled biopsy clip.   - Focal microcalcifications in benign breast tissue.     B.  Right breast, additional medial margin, excision:   - Negative for atypia and malignancy.     C.  Right breast, additional posterior margin, excision:   - Negative for atypia and malignancy.   - Focal fibrocystic change.     D.  Right breast, additional lateral margin, excision:   - Negative for atypia and malignancy.   - Focal parenchymal calcification.   - Focal minute medical calcification in an artery.     Assessment    Ms. Loaiza is a 69 year old female invasive ductal carcinoma of the right breast, recurrent, status post lumpectomy.  Final pathology demonstrated IDC, grade 2, no LVSI, negative margins, triple negative, 1 cm in size, pathologic T1bNX.  Evaluate potential role of adjuvant radiation therapy in the setting of reirradiation.     Plan     We discussed the role of adjuvant radiation therapy in all comers. We discussed that adjuvant radiation therapy to the whole breast following lumpectomy is the standard of care. The most recent update of the EBCTCG metaanalysis for early stage breast cancer showed that for patients with pathologically node negative disease, radiotherapy reduces the 5 year risk of any recurrence from 31% to 15% (Lancet, 2011).  This translated into a 3.3% absolute survival benefit at 15 years for all-comers.      However, this benefit is reduced for older patients. In the CALGB 9343 (ANA Joe 2013) trial, patients over age 70 with hormone positive invasive ductal carcinoma after lumpectomy were treated with tamoxifen alone versus tamoxifen plus radiation. They experienced  locoregional recurrence of 2% versus 10% at 10 years, but there was no difference in overall survival. These results are similar to those seen in patients over 65 with hormone positive early stage cancers and found a reduction in recurrence at 5 years from 4.1% to 1.3% with radiation. Mrs. Loaiza, however, does not meet eligibility for observation given her history of triple negative disease. Additionally, she has multiple recurrent disease, which suggests a more aggressive biology of tumor.     Ideally, mastectomy would be have been performed, given multiple recurrent disease in setting of prior radiation. However, the patient was adamant about avoiding mastectomy and decision was made to perform lumpectomy with consideration of re-irradiation despite increase risk of side effect.     Preliminary reports of partial breast reirradiation, from RTOG 1014, are suggest low toxicity and excellent local control. Given the time interval from her prior course, I feel it is reasonable to proceed with re-irradiation. I would try to limit toxicity by reducing target volume size. I discussed I would consider 4005 cGy in 15 fractions +/- boost.      We also discussed the logistics of treatment planning and delivery in detail with the patient. We furthermore discussed side effects, including short term risks of fatigue and skin reaction, and long term risks of pneumonitis, lung fibrosis, soft tissue fibrosis, skin changes, breast contour changes, rib fractures, cardiac damage, secondary cancers, lymphedema, and thyroid dysfunction.  We described the use of 3D-conformal radiotherapy to minimize dose to the normal tissues, while adequately covering the target tissues, and the ability of this technique to decrease potential for toxicity.     With regards to sparing of the heart, we discussed the use of deep inspiration breath hold for treatment planning and delivery.  This method has been shown to significantly reduce dose to the  lung and heart as compared to treatment with free breathing (Gabo et al, AJCO, 2012).  We discussed that this will involve 3D-conformal treatment planning, with contouring of the heart, and that limiting heart dose will be a high priority for treatment planning, as will dose to the contralateral breast and lung.      The risks and benefits of radiation therapy were discussed in detail with the patient. The patient expressed an understanding of these risks and is in agreement to undergo radiation treatment at this time.     We will plan to schedule CT simulation accordingly with plan to begin RT 7-10 days thereafter. Consent obtained.        Evert Segura MD  Department of Radiation Oncology  [facility]         Evert Segura MD

## 2020-03-05 ENCOUNTER — OFFICE VISIT (OUTPATIENT)
Dept: RADIATION THERAPY | Facility: OUTPATIENT CENTER | Age: 70
End: 2020-03-05
Payer: COMMERCIAL

## 2020-03-05 DIAGNOSIS — Z17.1 MALIGNANT NEOPLASM OF RIGHT BREAST IN FEMALE, ESTROGEN RECEPTOR NEGATIVE, UNSPECIFIED SITE OF BREAST (H): Primary | ICD-10-CM

## 2020-03-05 DIAGNOSIS — C50.911 MALIGNANT NEOPLASM OF RIGHT BREAST IN FEMALE, ESTROGEN RECEPTOR NEGATIVE, UNSPECIFIED SITE OF BREAST (H): Primary | ICD-10-CM

## 2020-03-05 NOTE — LETTER
3/5/2020      RE: Milly M Josse  [address] [facility] [address]       Patient underwent CT simulation.     Evert Segura M.D.  Department of Radiation Oncology  [facility]       Evert Segura MD

## 2020-03-05 NOTE — PROGRESS NOTES
Patient underwent CT simulation.     Evert Segura M.D.  Department of Radiation Oncology  [facility]

## 2020-03-16 ENCOUNTER — APPOINTMENT (OUTPATIENT)
Dept: RADIATION THERAPY | Facility: OUTPATIENT CENTER | Age: 70
End: 2020-03-16
Payer: COMMERCIAL

## 2020-03-17 ENCOUNTER — APPOINTMENT (OUTPATIENT)
Dept: RADIATION THERAPY | Facility: OUTPATIENT CENTER | Age: 70
End: 2020-03-17
Payer: COMMERCIAL

## 2020-03-18 ENCOUNTER — APPOINTMENT (OUTPATIENT)
Dept: RADIATION THERAPY | Facility: OUTPATIENT CENTER | Age: 70
End: 2020-03-18
Payer: COMMERCIAL

## 2020-03-18 ENCOUNTER — OFFICE VISIT (OUTPATIENT)
Dept: RADIATION THERAPY | Facility: OUTPATIENT CENTER | Age: 70
End: 2020-03-18
Payer: COMMERCIAL

## 2020-03-18 VITALS
WEIGHT: 159 LBS | BODY MASS INDEX: 27.29 KG/M2 | RESPIRATION RATE: 18 BRPM | DIASTOLIC BLOOD PRESSURE: 98 MMHG | SYSTOLIC BLOOD PRESSURE: 167 MMHG | HEART RATE: 91 BPM | OXYGEN SATURATION: 91 %

## 2020-03-18 DIAGNOSIS — Z17.1 MALIGNANT NEOPLASM OF RIGHT BREAST IN FEMALE, ESTROGEN RECEPTOR NEGATIVE, UNSPECIFIED SITE OF BREAST (H): Primary | ICD-10-CM

## 2020-03-18 DIAGNOSIS — C50.911 MALIGNANT NEOPLASM OF RIGHT BREAST IN FEMALE, ESTROGEN RECEPTOR NEGATIVE, UNSPECIFIED SITE OF BREAST (H): Primary | ICD-10-CM

## 2020-03-18 NOTE — LETTER
3/18/2020      RE: Milly Loaiza  [address] [facility] [address]       [facility]  SPECIALIZING IN BREAKTHROUGHS  Radiation Oncology    On Treatment Visit Note      Milly Loaiza      Date: 3/18/2020   MRN: [id number]   : 1950  Diagnosis: Recurrent breast cancer      Reason for Visit:  On Radiation Treatment Visit     Treatment Summary to Date  Treatment Site: R breast Current Dose: 801/4005 cGy Fractions: 3/15      Chemotherapy  Chemo concurrent with radx?: No    Subjective:   Doing well. No acute trouble. Applying skin care. No pain.    Nursing ROS:   Nutrition Alteration  Diet Type: Patient's Preference  Skin  Skin Reaction: 0 - No changes        Cardiovascular  Respiratory effort: 1 - Normal - without distress           Pain Assessment  0-10 Pain Scale: 0      Objective:   BP (!) 167/98   Pulse 91   Resp 18   Wt 72.1 kg (159 lb)   LMP  (LMP Unknown)   SpO2 91%   BMI 27.29 kg/m     Skin with minimal erythema without desquamation.     Labs:  CBC RESULTS:   Recent Labs   Lab Test 10/23/19  1637   WBC 8.5   RBC 4.80   HGB 14.0   HCT 42.2   MCV 88   MCH 29.2   MCHC 33.2   RDW 12.6        ELECTROLYTES:  Recent Labs   Lab Test 19  1453      POTASSIUM 3.5   CHLORIDE 106   LUCIAN 9.1   CO2 29   BUN 11   CR 0.82   GLC 92       Assessment:  Ms. Loaiza is a 69 year old female invasive ductal carcinoma of the right breast, recurrent, status post lumpectomy.  Final pathology demonstrated IDC, grade 2, no LVSI, negative margins, triple negative, 1 cm in size, pathologic T1bNX.  She is undergoing adjuvant whole breast in setting of reirradiation.     Tolerating radiation therapy well.  All questions and concerns addressed.    Plan:   1. Continue current therapy.    2. Continue skin care.       Mosaiq chart and setup information reviewed  Ports checked    Medication Review  Med list reviewed with patient?: Yes    Educational Topic Discussed  Education  Instructions: reviewed skin care      MD Evert Hutchinson MD

## 2020-03-18 NOTE — PROGRESS NOTES
[facility]ANS  SPECIALIZING IN BREAKTHROUGHS  Radiation Oncology    On Treatment Visit Note      Milly Loaiza      Date: 3/18/2020   MRN: [id number]   : 1950  Diagnosis: Recurrent breast cancer      Reason for Visit:  On Radiation Treatment Visit     Treatment Summary to Date  Treatment Site: R breast Current Dose: 801/4005 cGy Fractions: 3/15      Chemotherapy  Chemo concurrent with radx?: No    Subjective:   Doing well. No acute trouble. Applying skin care. No pain.    Nursing ROS:   Nutrition Alteration  Diet Type: Patient's Preference  Skin  Skin Reaction: 0 - No changes        Cardiovascular  Respiratory effort: 1 - Normal - without distress           Pain Assessment  0-10 Pain Scale: 0      Objective:   BP (!) 167/98   Pulse 91   Resp 18   Wt 72.1 kg (159 lb)   LMP  (LMP Unknown)   SpO2 91%   BMI 27.29 kg/m     Skin with minimal erythema without desquamation.     Labs:  CBC RESULTS:   Recent Labs   Lab Test 10/23/19  1637   WBC 8.5   RBC 4.80   HGB 14.0   HCT 42.2   MCV 88   MCH 29.2   MCHC 33.2   RDW 12.6        ELECTROLYTES:  Recent Labs   Lab Test 19  1453      POTASSIUM 3.5   CHLORIDE 106   LUCIAN 9.1   CO2 29   BUN 11   CR 0.82   GLC 92       Assessment:  Ms. Loaiza is a 69 year old female invasive ductal carcinoma of the right breast, recurrent, status post lumpectomy.  Final pathology demonstrated IDC, grade 2, no LVSI, negative margins, triple negative, 1 cm in size, pathologic T1bNX.  She is undergoing adjuvant whole breast in setting of reirradiation.     Tolerating radiation therapy well.  All questions and concerns addressed.    Plan:   1. Continue current therapy.    2. Continue skin care.       Mosaiq chart and setup information reviewed  Ports checked    Medication Review  Med list reviewed with patient?: Yes    Educational Topic Discussed  Education Instructions: reviewed skin care      Evert Segura MD

## 2020-03-19 ENCOUNTER — APPOINTMENT (OUTPATIENT)
Dept: RADIATION THERAPY | Facility: OUTPATIENT CENTER | Age: 70
End: 2020-03-19
Payer: COMMERCIAL

## 2020-03-20 ENCOUNTER — APPOINTMENT (OUTPATIENT)
Dept: RADIATION THERAPY | Facility: OUTPATIENT CENTER | Age: 70
End: 2020-03-20
Payer: COMMERCIAL

## 2020-03-23 ENCOUNTER — APPOINTMENT (OUTPATIENT)
Dept: RADIATION THERAPY | Facility: OUTPATIENT CENTER | Age: 70
End: 2020-03-23
Payer: COMMERCIAL

## 2020-03-24 ENCOUNTER — APPOINTMENT (OUTPATIENT)
Dept: RADIATION THERAPY | Facility: OUTPATIENT CENTER | Age: 70
End: 2020-03-24
Payer: COMMERCIAL

## 2020-03-25 ENCOUNTER — OFFICE VISIT (OUTPATIENT)
Dept: RADIATION THERAPY | Facility: OUTPATIENT CENTER | Age: 70
End: 2020-03-25
Payer: COMMERCIAL

## 2020-03-25 ENCOUNTER — APPOINTMENT (OUTPATIENT)
Dept: RADIATION THERAPY | Facility: OUTPATIENT CENTER | Age: 70
End: 2020-03-25
Payer: COMMERCIAL

## 2020-03-25 VITALS
BODY MASS INDEX: 27.43 KG/M2 | DIASTOLIC BLOOD PRESSURE: 91 MMHG | OXYGEN SATURATION: 97 % | HEART RATE: 78 BPM | RESPIRATION RATE: 18 BRPM | WEIGHT: 159.8 LBS | SYSTOLIC BLOOD PRESSURE: 152 MMHG

## 2020-03-25 DIAGNOSIS — C50.911 INVASIVE DUCTAL CARCINOMA OF BREAST, RIGHT (H): Primary | ICD-10-CM

## 2020-03-25 ASSESSMENT — PAIN SCALES - GENERAL: PAINLEVEL: NO PAIN (0)

## 2020-03-25 NOTE — PROGRESS NOTES
[facility] PHYSICIANS  SPECIALIZING IN BREAKTHROUGHS  Radiation Oncology    On Treatment Visit Note      Milly Loaiza      Date: 3/25/2020   MRN: [id number]   : 1950  Diagnosis: Recurrent breast cancer      Reason for Visit:  On Radiation Treatment Visit     Treatment Summary to Date  Treatment Site: R breast Current Dose: 2136/4005 cGy Fractions: 8/15      Chemotherapy  Chemo concurrent with radx?: No    Subjective:   Doing well. No acute trouble. Applying skin care. No pain.    Nursing ROS:   Nutrition Alteration  Diet Type: Patient's Preference  Skin  Skin Reaction: 0 - No changes        Cardiovascular  Respiratory effort: 1 - Normal - without distress           Pain Assessment  0-10 Pain Scale: 0      Objective:   BP (!) 152/91   Pulse 78   Resp 18   Wt 72.5 kg (159 lb 12.8 oz)   LMP  (LMP Unknown)   SpO2 97%   BMI 27.43 kg/m     Skin with mild erythema without desquamation.     Labs:  CBC RESULTS:   Recent Labs   Lab Test 10/23/19  1637   WBC 8.5   RBC 4.80   HGB 14.0   HCT 42.2   MCV 88   MCH 29.2   MCHC 33.2   RDW 12.6        ELECTROLYTES:  Recent Labs   Lab Test 19  1453      POTASSIUM 3.5   CHLORIDE 106   LUCIAN 9.1   CO2 29   BUN 11   CR 0.82   GLC 92       Assessment:  Ms. Loaiza is a 69 year old female invasive ductal carcinoma of the right breast, recurrent, status post lumpectomy.  Final pathology demonstrated IDC, grade 2, no LVSI, negative margins, triple negative, 1 cm in size, pathologic T1bNX.  She is undergoing adjuvant whole breast in setting of reirradiation.     Tolerating radiation therapy well.  All questions and concerns addressed.    Plan:   1. Continue current therapy.    2. Continue skin care.       Mosaiq chart and setup information reviewed  Ports checked    Medication Review  Med list reviewed with patient?: Yes    Educational Topic Discussed  Education Instructions: reviewed skin care      Evert Segura MD

## 2020-03-26 ENCOUNTER — APPOINTMENT (OUTPATIENT)
Dept: RADIATION THERAPY | Facility: OUTPATIENT CENTER | Age: 70
End: 2020-03-26
Payer: COMMERCIAL

## 2020-03-27 ENCOUNTER — APPOINTMENT (OUTPATIENT)
Dept: RADIATION THERAPY | Facility: OUTPATIENT CENTER | Age: 70
End: 2020-03-27
Payer: COMMERCIAL

## 2020-03-30 ENCOUNTER — APPOINTMENT (OUTPATIENT)
Dept: RADIATION THERAPY | Facility: OUTPATIENT CENTER | Age: 70
End: 2020-03-30
Payer: COMMERCIAL

## 2020-03-31 ENCOUNTER — APPOINTMENT (OUTPATIENT)
Dept: RADIATION THERAPY | Facility: OUTPATIENT CENTER | Age: 70
End: 2020-03-31
Payer: COMMERCIAL

## 2020-04-01 ENCOUNTER — OFFICE VISIT (OUTPATIENT)
Dept: RADIATION THERAPY | Facility: OUTPATIENT CENTER | Age: 70
End: 2020-04-01
Payer: COMMERCIAL

## 2020-04-01 ENCOUNTER — APPOINTMENT (OUTPATIENT)
Dept: RADIATION THERAPY | Facility: OUTPATIENT CENTER | Age: 70
End: 2020-04-01
Payer: COMMERCIAL

## 2020-04-01 VITALS
SYSTOLIC BLOOD PRESSURE: 155 MMHG | RESPIRATION RATE: 16 BRPM | OXYGEN SATURATION: 98 % | BODY MASS INDEX: 27.64 KG/M2 | WEIGHT: 161 LBS | HEART RATE: 81 BPM | DIASTOLIC BLOOD PRESSURE: 93 MMHG

## 2020-04-01 DIAGNOSIS — C50.911 INVASIVE DUCTAL CARCINOMA OF BREAST, RIGHT (H): Primary | ICD-10-CM

## 2020-04-01 ASSESSMENT — PAIN SCALES - GENERAL: PAINLEVEL: NO PAIN (0)

## 2020-04-01 NOTE — PROGRESS NOTES
[facility] PHYSICIANS  SPECIALIZING IN BREAKTHROUGHS  Radiation Oncology    On Treatment Visit Note      Milly Loaiza      Date: 2020   MRN: [id number]   : 1950  Diagnosis: Recurrent breast cancer      Reason for Visit:  On Radiation Treatment Visit     Treatment Summary to Date  Treatment Site: R breast Current Dose: 3471/4005 cGy Fractions: 13/15      Chemotherapy  Chemo concurrent with radx?: No    Subjective:   Doing well. No acute trouble. Applying skin care. No pain.    Nursing ROS:   Nutrition Alteration  Diet Type: Patient's Preference  Skin  Skin Reaction: 0 - No changes        Cardiovascular  Respiratory effort: 1 - Normal - without distress           Pain Assessment  0-10 Pain Scale: 0      Objective:   BP (!) 155/93 (BP Location: Left arm, Patient Position: Sitting, Cuff Size: Adult Regular)   Pulse 81   Resp 16   Wt 73 kg (161 lb)   LMP  (LMP Unknown)   SpO2 98%   BMI 27.64 kg/m     Skin with mild erythema without desquamation.     Labs:  CBC RESULTS:   Recent Labs   Lab Test 10/23/19  1637   WBC 8.5   RBC 4.80   HGB 14.0   HCT 42.2   MCV 88   MCH 29.2   MCHC 33.2   RDW 12.6        ELECTROLYTES:  Recent Labs   Lab Test 19  1453      POTASSIUM 3.5   CHLORIDE 106   LUCIAN 9.1   CO2 29   BUN 11   CR 0.82   GLC 92       Assessment:  Ms. Loaiza is a 69 year old female invasive ductal carcinoma of the right breast, recurrent, status post lumpectomy.  Final pathology demonstrated IDC, grade 2, no LVSI, negative margins, triple negative, 1 cm in size, pathologic T1bNX.  She is undergoing adjuvant whole breast in setting of reirradiation.     Tolerating radiation therapy well.  All questions and concerns addressed.    Plan:   1. Continue current therapy.  EOT Friday. RTC in month with NP.  2. Continue skin care.       Mosaiq chart and setup information reviewed  Ports checked    Medication Review  Med list reviewed with patient?: Yes    Educational Topic  Discussed  Education Instructions: reviewed skin care      Evert Segura MD

## 2020-04-01 NOTE — LETTER
2020      RE: Milly Loaiza  [address] [place] [address]       [facility] PHYSICIANS  SPECIALIZING IN BREAKTHROUGHS  Radiation Oncology    On Treatment Visit Note      Milly Loaiza      Date: 2020   MRN: [id number]   : 1950  Diagnosis: Recurrent breast cancer      Reason for Visit:  On Radiation Treatment Visit     Treatment Summary to Date  Treatment Site: R breast Current Dose: 3471/4005 cGy Fractions: 13/15      Chemotherapy  Chemo concurrent with radx?: No    Subjective:   Doing well. No acute trouble. Applying skin care. No pain.    Nursing ROS:   Nutrition Alteration  Diet Type: Patient's Preference  Skin  Skin Reaction: 0 - No changes        Cardiovascular  Respiratory effort: 1 - Normal - without distress           Pain Assessment  0-10 Pain Scale: 0      Objective:   BP (!) 155/93 (BP Location: Left arm, Patient Position: Sitting, Cuff Size: Adult Regular)   Pulse 81   Resp 16   Wt 73 kg (161 lb)   LMP  (LMP Unknown)   SpO2 98%   BMI 27.64 kg/m     Skin with mild erythema without desquamation.     Labs:  CBC RESULTS:   Recent Labs   Lab Test 10/23/19  1637   WBC 8.5   RBC 4.80   HGB 14.0   HCT 42.2   MCV 88   MCH 29.2   MCHC 33.2   RDW 12.6        ELECTROLYTES:  Recent Labs   Lab Test 19  1453      POTASSIUM 3.5   CHLORIDE 106   LUCIAN 9.1   CO2 29   BUN 11   CR 0.82   GLC 92       Assessment:  Ms. Loaiza is a 69 year old female invasive ductal carcinoma of the right breast, recurrent, status post lumpectomy.  Final pathology demonstrated IDC, grade 2, no LVSI, negative margins, triple negative, 1 cm in size, pathologic T1bNX.  She is undergoing adjuvant whole breast in setting of reirradiation.     Tolerating radiation therapy well.  All questions and concerns addressed.    Plan:   1. Continue current therapy.  EOT Friday. RTC in month with NP.  2. Continue skin care.       wunderloop chart and setup information reviewed  Ports  checked    Medication Review  Med list reviewed with patient?: Yes    Educational Topic Discussed  Education Instructions: reviewed skin care      Evert Segura MD

## 2020-04-02 ENCOUNTER — APPOINTMENT (OUTPATIENT)
Dept: RADIATION THERAPY | Facility: OUTPATIENT CENTER | Age: 70
End: 2020-04-02
Payer: COMMERCIAL

## 2020-04-03 ENCOUNTER — APPOINTMENT (OUTPATIENT)
Dept: RADIATION THERAPY | Facility: OUTPATIENT CENTER | Age: 70
End: 2020-04-03
Payer: COMMERCIAL

## 2020-04-06 ENCOUNTER — VIRTUAL VISIT (OUTPATIENT)
Dept: ONCOLOGY | Facility: CLINIC | Age: 70
End: 2020-04-06
Attending: INTERNAL MEDICINE
Payer: COMMERCIAL

## 2020-04-06 VITALS — HEIGHT: 64 IN | BODY MASS INDEX: 27.31 KG/M2 | WEIGHT: 160 LBS

## 2020-04-06 DIAGNOSIS — C50.911 RECURRENT BREAST CANCER, RIGHT (H): Primary | ICD-10-CM

## 2020-04-06 DIAGNOSIS — Z12.31 SCREENING MAMMOGRAM, ENCOUNTER FOR: ICD-10-CM

## 2020-04-06 PROCEDURE — 99214 OFFICE O/P EST MOD 30 MIN: CPT | Mod: TEL | Performed by: INTERNAL MEDICINE

## 2020-04-06 ASSESSMENT — MIFFLIN-ST. JEOR: SCORE: 1229.73

## 2020-04-06 ASSESSMENT — PAIN SCALES - GENERAL: PAINLEVEL: NO PAIN (0)

## 2020-04-06 NOTE — PROGRESS NOTES
Oncology follow-up visit    Telephone visit    CHIEF COMPLAINT AND REASON FOR VISIT:   Right breast cancer , ,        HISTORY OF ONCOLOGY ILLNESS: Milly Loaiza was diagnosed first time end of  through screening mammogram right breast cancer, lumpectomy 2004 T2N0M0 poorly differentiated infiltrating ductal cancer, ER negative, HER-2 negative.   She had 3 cycles of AC, could not tolerate anymore chemotherapy, followed by radiation.    She had a second breast cancer on the right side diagnosed screening mammogram 2008. Biopsy 2009 at [facility] indicating high-grade DCIS with necrosis, not enough tissue for ER/ME studies. Pathology from lumpectomy 2009 no residual invasive cancer.      She has abnormal screening mammogram 2019 which found right breast 0.9 cm focal asymmetry 2-3 o'clock in the right breast 2.7 cm from the nipple.  US biopsy found IDC grade III, ER/ME-, Her 2 FISH equivocal, IHC 1+, so combined Her2 final reading is negative.      Due to the small size of the lesion, her prior poor tolerance to chemotherapy, upfront surgical lumpectomy is offered 2020 found IDC 1 cm, grade 2, no LVI, pT1bNx. No LN removed due to prior LND surgery.      INTERVAL HISTORY:  She finished right postlumpectomy radiation 2020  She made informed decision multiple times not proceeding with adjuvant chemotherapy.      PAST MEDICAL HISTORY: Nothing other than breast cancer.     MEDICATIONS: No routine medication other than multivitamin.     ALLERGIES: Sulfa.     SOCIAL HISTORY: She lives with her  at home.  She has a lot of animals to take care of.     FAMILY HISTORY: Paternal grandmother was diagnosed with breast cancer and  from that at age 68 back in the 60s.     HABITS: Never smoked. Does not use alcohol.       REVIEW OF SYSTEMS:   She says she tolerated RT fine, with some fibrosis and redness.    CURRENT LAB DATA REVIEWED TODAY WITH PATIENT OVER THE  PHONE:  Lumpectomy is offered 1/2020 found IDC 1 cm, grade 2, no LVI, no LN removed due to prior surgery, pT1bNx.    12/2019 BRCA actionable panel is negative.     12/2019 right breast 2-3 o'clock in the right breast 2.7 cm from the nipple 0.9 cm nodule US biopsy found IDC grade III, Er/MA-, Her 2 FISH equivocal, IHC pending.       OLD DATA REVIEW IN SUMMARY WITH PATIENT OVER THE PHONE:  [id number] at 44 in 9/2019, at  48 in 10/2017, at 50 in 10/2016, at 36 in 10/2015, at 46 in 12/2014, 46 in 10/2014  from nl, at 41 in 2011.    MA 9/2019- There is a 0.9 cm focal asymmetry at 2-3:00 in the right breast and 2.7 cm from the nipple.       ASSESSMENT AND PLAN DISCUSSED WITH PATIENT TODAY OVER THE PHONE:  1. Three-time right breast cancer survivor. First time was triple negative breast cancer. The second one was DCIS.   All third time is triple negative T1b lesion status post lumpectomy and postlumpectomy radiation.      We discussed the role of adjuvant systemic chemotherapy again today for triple negative breast cancer in postop setting.  Since her tumor is greater than 5 mm, would recommend her to consider some form of systemic adjuvant chemotherapy.  Considering her prior poor tolerance to AC, recommend her to consider weekly Taxol.  We discussed the chemotherapy side effect, which include but not limited to GI toxicity nausea vomiting, lower immunity and bleeding risk from cytopenia, neurotoxicity, cardica toxicity,  infusion related reaction, mortality, etc.  She made informed decision not to proceed with chemo.     2. Elevation of her tumor marker , now looking back, this maybe related to her breast cancer recurrence.   Will check it with her follow-up visit to see if is come back to normal.    Phone call VISIT time is 30 minutes, spent in discussing the recommendation of adjuvant chemotherapy side effect, the rationale behind the recommendation, and follow-up plan.  I have reviewed the note as documented above.  This  accurately captures the substance of my conversation with the patient.

## 2020-04-06 NOTE — PROGRESS NOTES
"Milly Loaiza is a 69 year old female who is being evaluated via a billable telephone visit.      The patient has been notified of following:     \"This telephone visit will be conducted via a call between you and your physician/provider. We have found that certain health care needs can be provided without the need for a physical exam.  This service lets us provide the care you need with a short phone conversation.  If a prescription is necessary we can send it directly to your pharmacy.  If lab work is needed we can place an order for that and you can then stop by our lab to have the test done at a later time.    If during the course of the call the physician/provider feels a telephone visit is not appropriate, you will not be charged for this service.\"     Patient has given verbal consent for Telephone visit?  Yes    Milly Loaiza complains of    Chief Complaint   Patient presents with     Oncology Clinic Visit     Breast cancer, Post radiation therapy.        I have reviewed and updated the patient's Past Medical History, Social History, Family History and Medication List.    ALLERGIES  Dust mites; Coconut fatty acids; Penicillins; and Sulfa drugs    Additional provider notes: Breast cancer, Post radiation therapy.     Ratna Cee, [facility]  "

## 2020-04-07 ENCOUNTER — DOCUMENTATION ONLY (OUTPATIENT)
Dept: RADIATION THERAPY | Facility: OUTPATIENT CENTER | Age: 70
End: 2020-04-07

## 2020-04-07 NOTE — PROGRESS NOTES
Department of Radiation Oncology  Radiation Therapy Center  [facility] [place] [address]  [phone]       Radiotherapy Treatment Summary          Treatment dates: 3/16/20-20    PATIENT: Milly Loaiza  MEDICAL RECORD NO: [id number]   : 1950    DIAGNOSIS: invasive ductal carcinoma of the right breast, recurrent, status post lumpectomy  PATHOLOGY:  Final pathology demonstrated IDC, grade 2, no LVSI, negative margins, triple negative, 1 cm in size                        STAGE: pathologic T1bNX   INTENT OF RADIOTHERAPY:  adjuvant whole breast in setting of reirradiation.   CONCURRENT SYSTEMIC THERAPY: No    ONCOLOGIC HISTORY:    Ms. Loaiza is a 69 year old female invasive ductal carcinoma of the right breast, recurrent, status post lumpectomy.  Final pathology demonstrated IDC, grade 2, no LVSI, negative margins, triple negative, 1 cm in size, pathologic T1bNX.        Patient's oncologic history dates to  when she was diagnosed with right breast invasive ductal carcinoma.  She underwent lumpectomy on 2004.  Final pathology demonstrated IDC, 2.5 cm, no LVSI, negative margins, 0 out of 11 sentinel lymph nodes, triple negative breast cancer.  The patient subsequent completed adjuvant systemic therapy with AC and subsequent postoperative radiation therapy to the right breast.  She received a total dose of 60.4 Gy in 33 treatments, completing radiation in 2004.  The patient had recurrent right breast high-grade DCIS, diagnosed in 2009.  Subsequent wide local excision on 3/23/2009 demonstrated no evidence of residual DCIS or malignancy.  On 2019 the patient underwent right breast biopsy for an abnormality detected by imaging.  On pathology demonstrated IDC, grade 3, no LVSI, ER negative, RI negative, HER-2 negative.  The patient met with our surgeon colleagues (Dr. King) who discussed mastectomy with patient given her prior multiple recurrences.   The patient was however adamant about  proceeding with lumpectomy at this time.  On 1/27/2012 the patient underwent right breast lumpectomy.  Final pathology demonstrated IDC, grade 2, no LVSI, negative margins, 1 cm in size, previous biopsy clip was obtained, pathologic T1bNX.  The patient met with Dr. Lerma of medical oncology who discussed consideration of adjuvant systemic therapy.  The patient declined systemic therapy at this time.         SITE OF TREATMENT: R breast    DATES  OF TREATMENT: 3/16/20-4/03/20    TOTAL DOSE OF TREATMENT: 4005 cGy    DOSE PER FRACTION OF TREATMENT: 267 cGy x 15 fractions       COMMENT/TOXICITY:  No acute trouble. Applying skin care. No pain. Skin with mild erythema without desquamation.             PAIN MANAGEMENT:     Pain Assessment  0-10 Pain Scale: 0    none                        FOLLOW UP PLAN:  1.  RTC in month with NP.  2. Continue skin care.     Radiation Oncologist: Evert Segura M.D.  Department of Radiation Oncology  [facility]

## 2020-04-17 NOTE — PROGRESS NOTES
Department of Radiation Oncology  Radiation Therapy Center  [facility] [place] [address]  [phone]         Radiation Oncology Follow-up Visit  2020      Milly Loaiza  MRN: [id number]   : 1950     DIAGNOSIS: invasive ductal carcinoma of the right breast, recurrent, status post lumpectomy  PATHOLOGY:  Final pathology demonstrated IDC, grade 2, no LVSI, negative margins, triple negative, 1 cm in size                        STAGE: pathologic T1bNX   INTENT OF RADIOTHERAPY:  adjuvant whole breast in setting of reirradiation.   CONCURRENT SYSTEMIC THERAPY: No     ONCOLOGIC HISTORY:    Ms. Loaiza is a 69 year old female invasive ductal carcinoma of the right breast, recurrent, status post lumpectomy.  Final pathology demonstrated IDC, grade 2, no LVSI, negative margins, triple negative, 1 cm in size, pathologic T1bNX.        Patient's oncologic history dates to  when she was diagnosed with right breast invasive ductal carcinoma.  She underwent lumpectomy on 2004.  Final pathology demonstrated IDC, 2.5 cm, no LVSI, negative margins, 0 out of 11 sentinel lymph nodes, triple negative breast cancer.  The patient subsequent completed adjuvant systemic therapy with AC and subsequent postoperative radiation therapy to the right breast.  She received a total dose of 60.4 Gy in 33 treatments, completing radiation in 2004.  The patient had recurrent right breast high-grade DCIS, diagnosed in 2009.  Subsequent wide local excision on 3/23/2009 demonstrated no evidence of residual DCIS or malignancy.  On 2019 the patient underwent right breast biopsy for an abnormality detected by imaging.  On pathology demonstrated IDC, grade 3, no LVSI, ER negative, MA negative, HER-2 negative.  The patient met with our surgeon colleagues (Dr. King) who discussed mastectomy with patient given her prior multiple recurrences.  The patient was however adamant about   proceeding with lumpectomy at this time.  On 1/27/2012 the patient underwent right breast lumpectomy.  Final pathology demonstrated IDC, grade 2, no LVSI, negative margins, 1 cm in size, previous biopsy clip was obtained, pathologic T1bNX.  The patient met with Dr. Lerma of medical oncology who discussed consideration of adjuvant systemic therapy.  The patient declined systemic therapy at this time.          SITE OF TREATMENT: R breast     DATES  OF TREATMENT: 3/16/20-4/03/20     TOTAL DOSE OF TREATMENT: 4005 cGy     DOSE PER FRACTION OF TREATMENT: 267 cGy x 15 fractions       COMMENT/TOXICITY:  No acute trouble. Applying skin care. No pain. Skin with mild erythema without desquamation.                                                 INTERVAL SINCE COMPLETION OF RADIATION THERAPY:   1 month    SUBJECTIVE:   Milly Loaiza is a 69 year old female who is scheduled today for routine 1 month follow up after completing radiation therapy.    She has seen healing of her skin reaction. She reports she did have an area of desquamation R axilla with peeling. Now resolved with intact skin.  She continues to moisturize daily.  She also reports hyperpigmentation of skin to right breast. Denies any cough or shortness of breath related to possible risk of interstitial pneumonitis. Reports good ROM of right arm and shoulder.  She also has had improvement of her fatigue. No lymphedema. No pain.     ROS otherwise negative on a 12-system review.         Current Outpatient Medications   Medication     acetaminophen (TYLENOL) 325 MG tablet     aspirin 325 MG tablet     calcium carbonate (OS-LUCIAN 500 MG [place]. [place]) 500 MG tablet     DAILY MULTI VITAMIN/MINERALS OR     lisinopril (ZESTRIL) 20 MG tablet     No current facility-administered medications for this visit.           Allergies   Allergen Reactions     Dust Mites Unknown     Coconut Fatty Acids Itching and Rash     Purple blotches     Penicillins Rash     Sulfa Drugs Rash     Purple  lubna       Past Medical History:   Diagnosis Date     Acute reaction to stress 10/9/2019     Malignant neoplasm of breast (female), unspecified site          PHYSICAL EXAM:  LMP  (LMP Unknown)   No PE telephone encounter    LABS AND IMAGING:  none    IMPRESSION:   Ms. Loaiza is a 69 year old female with a  invasive ductal carcinoma of the right breast, recurrent, status post lumpectomy. Final pathology demonstrated IDC, grade 2, no LVSI, negative margins, triple negative, 1 cm in size, pathologic T1bNX. Completed adjuvant whole breast in setting of reirradiation with 4005 cGy to R Breast from 3/16/20-4/03/20 .     The patient met with Dr. Lerma of medical oncology who discussed the role of adjuvant systemic chemotherapy  for triple negative breast cancer in postop setting. The patient declined systemic therapy at this time.    Scheduled today for one month follow-up after completion of radiation therapy.     PLAN:   Acute toxicities from RT improving. Has mild residual hyperpigmentation and mild fatigue which will continue to improve with time. Discussed long term care with continued moisturizing of the treated breast, and the use of sun screen.  Discussed possibility of scar formation from radiation and treatment with gentle massage. She will follow up here 6 months with Dr. Segura.     Patient will follow up with medical oncology for continued care and imaging ().  According to NCCN guidelines, follow-up of breast cancer should include history and physical examination with emphasis on breast examination 1 to 4 times per year as clinically appropriate for 5 years, then annually. Emphasis is also on continuing with annual mammography. Last screening bilateral mammogram was 9/23/2019. Patient to have scheduled 9/2019 prior to her next followup with Dr. Lerma.     Lymphedema.She remains at risk for lymphedema.  She denies lymphedema at this time. Education completed. Will continue to monitor and refer to lymphedema  management as needed.     Cancer screening.  should undergo routine screening for age group. She should limit her sun exposure and use sunscreens.     Healthy lifestyle.  She should continue to refrain from tobacco.  She will maintain a healthy weight with a BMI between 20 and 25.  She should continue to stay physically active.   She will continue to see her primary care provider for general health maintenance.     Due to the concerns around COVID-19 and adhering to social distancing we conduct this visit over the telephone.   Phone call duration: 25 minutes    Cinda Bee [facility]  Radiation Therapy Center  [facility]  [address], [address]  [place] [address]

## 2020-05-01 ENCOUNTER — VIRTUAL VISIT (OUTPATIENT)
Dept: RADIATION THERAPY | Facility: OUTPATIENT CENTER | Age: 70
End: 2020-05-01
Payer: COMMERCIAL

## 2020-05-01 DIAGNOSIS — Z17.1 MALIGNANT NEOPLASM OF RIGHT BREAST IN FEMALE, ESTROGEN RECEPTOR NEGATIVE, UNSPECIFIED SITE OF BREAST (H): Primary | ICD-10-CM

## 2020-05-01 DIAGNOSIS — C50.911 MALIGNANT NEOPLASM OF RIGHT BREAST IN FEMALE, ESTROGEN RECEPTOR NEGATIVE, UNSPECIFIED SITE OF BREAST (H): Primary | ICD-10-CM

## 2020-05-01 NOTE — LETTER
2020      RE: Milly Loaiza  [address] [facility] [address]          Department of Radiation Oncology  Radiation Therapy Center  [facility] [place] [address]  [phone]         Radiation Oncology Follow-up Visit  2020      Milly Loaiza  MRN: [id number]   : 1950     DIAGNOSIS: invasive ductal carcinoma of the right breast, recurrent, status post lumpectomy  PATHOLOGY:  Final pathology demonstrated IDC, grade 2, no LVSI, negative margins, triple negative, 1 cm in size                        STAGE: pathologic T1bNX   INTENT OF RADIOTHERAPY:  adjuvant whole breast in setting of reirradiation.   CONCURRENT SYSTEMIC THERAPY: No     ONCOLOGIC HISTORY:    Ms. Loaiza is a 69 year old female invasive ductal carcinoma of the right breast, recurrent, status post lumpectomy.  Final pathology demonstrated IDC, grade 2, no LVSI, negative margins, triple negative, 1 cm in size, pathologic T1bNX.        Patient's oncologic history dates to  when she was diagnosed with right breast invasive ductal carcinoma.  She underwent lumpectomy on 2004.  Final pathology demonstrated IDC, 2.5 cm, no LVSI, negative margins, 0 out of 11 sentinel lymph nodes, triple negative breast cancer.  The patient subsequent completed adjuvant systemic therapy with AC and subsequent postoperative radiation therapy to the right breast.  She received a total dose of 60.4 Gy in 33 treatments, completing radiation in 2004.  The patient had recurrent right breast high-grade DCIS, diagnosed in 2009.  Subsequent wide local excision on 3/23/2009 demonstrated no evidence of residual DCIS or malignancy.  On 2019 the patient underwent right breast biopsy for an abnormality detected by imaging.  On pathology demonstrated IDC, grade 3, no LVSI, ER negative, GA negative, HER-2 negative.  The patient met with our surgeon colleagues (Dr. King) who discussed mastectomy with  patient given her prior multiple recurrences.  The patient was however adamant about  proceeding with lumpectomy at this time.  On 1/27/2012 the patient underwent right breast lumpectomy.  Final pathology demonstrated IDC, grade 2, no LVSI, negative margins, 1 cm in size, previous biopsy clip was obtained, pathologic T1bNX.  The patient met with Dr. Lerma of medical oncology who discussed consideration of adjuvant systemic therapy.  The patient declined systemic therapy at this time.          SITE OF TREATMENT: R breast     DATES  OF TREATMENT: 3/16/20-4/03/20     TOTAL DOSE OF TREATMENT: 4005 cGy     DOSE PER FRACTION OF TREATMENT: 267 cGy x 15 fractions       COMMENT/TOXICITY:  No acute trouble. Applying skin care. No pain. Skin with mild erythema without desquamation.                                                 INTERVAL SINCE COMPLETION OF RADIATION THERAPY:   1 month    SUBJECTIVE:   Milly Loaiza is a 69 year old female who is scheduled today for routine 1 month follow up after completing radiation therapy.    She has seen healing of her skin reaction. She reports she did have an area of desquamation R axilla with peeling. Now resolved with intact skin.  She continues to moisturize daily.  She also reports hyperpigmentation of skin to right breast. Denies any cough or shortness of breath related to possible risk of interstitial pneumonitis. Reports good ROM of right arm and shoulder.  She also has had improvement of her fatigue. No lymphedema. No pain.     ROS otherwise negative on a 12-system review.         Current Outpatient Medications   Medication     acetaminophen (TYLENOL) 325 MG tablet     aspirin 325 MG tablet     calcium carbonate (OS-LUCIAN 500 MG [place]. [place]) 500 MG tablet     DAILY MULTI VITAMIN/MINERALS OR     lisinopril (ZESTRIL) 20 MG tablet     No current facility-administered medications for this visit.           Allergies   Allergen Reactions     Dust Mites Unknown     Coconut Fatty Acids  Itching and Rash     Purple blotches     Penicillins Rash     Sulfa Drugs Rash     Purple blotches       Past Medical History:   Diagnosis Date     Acute reaction to stress 10/9/2019     Malignant neoplasm of breast (female), unspecified site          PHYSICAL EXAM:  LMP  (LMP Unknown)   No PE telephone encounter    LABS AND IMAGING:  none    IMPRESSION:   Ms. Loaiza is a 69 year old female with a  invasive ductal carcinoma of the right breast, recurrent, status post lumpectomy. Final pathology demonstrated IDC, grade 2, no LVSI, negative margins, triple negative, 1 cm in size, pathologic T1bNX. Completed adjuvant whole breast in setting of reirradiation with 4005 cGy to R Breast from 3/16/20-4/03/20 .     The patient met with Dr. Lerma of medical oncology who discussed the role of adjuvant systemic chemotherapy  for triple negative breast cancer in postop setting. The patient declined systemic therapy at this time.    Scheduled today for one month follow-up after completion of radiation therapy.     PLAN:   Acute toxicities from RT improving. Has mild residual hyperpigmentation and mild fatigue which will continue to improve with time. Discussed long term care with continued moisturizing of the treated breast, and the use of sun screen.  Discussed possibility of scar formation from radiation and treatment with gentle massage. She will follow up here 6 months with Dr. Segura.     Patient will follow up with medical oncology for continued care and imaging ().  According to NCCN guidelines, follow-up of breast cancer should include history and physical examination with emphasis on breast examination 1 to 4 times per year as clinically appropriate for 5 years, then annually. Emphasis is also on continuing with annual mammography. Last screening bilateral mammogram was 9/23/2019. Patient to have scheduled 9/2019 prior to her next followup with Dr. Lerma.     Lymphedema.She remains at risk for lymphedema.  She denies  lymphedema at this time. Education completed. Will continue to monitor and refer to lymphedema management as needed.     Cancer screening.  should undergo routine screening for age group. She should limit her sun exposure and use sunscreens.     Healthy lifestyle.  She should continue to refrain from tobacco.  She will maintain a healthy weight with a BMI between 20 and 25.  She should continue to stay physically active.   She will continue to see her primary care provider for general health maintenance.     Due to the concerns around COVID-19 and adhering to social distancing we conduct this visit over the telephone.   Phone call duration: 25 minutes    Cinda Bee CNP  Radiation Therapy Center  [facility]  [address], [address]  [place] [address]       FABIANA Edwards CNP

## 2020-05-01 NOTE — NURSING NOTE
"Milly Loaiza is a 69 year old female who is being evaluated via a billable telephone visit.      The patient has been notified of following:     \"This telephone visit will be conducted via a call between you and your physician/provider. We have found that certain health care needs can be provided without the need for a physical exam.  This service lets us provide the care you need with a short phone conversation.  If a prescription is necessary we can send it directly to your pharmacy.  If lab work is needed we can place an order for that and you can then stop by our lab to have the test done at a later time.    Telephone visits are billed at different rates depending on your insurance coverage. During this emergency period, for some insurers they may be billed the same as an in-person visit.  Please reach out to your insurance provider with any questions.    If during the course of the call the physician/provider feels a telephone visit is not appropriate, you will not be charged for this service.\"    Patient has given verbal consent for Telephone visit?  Yes    How would you like to obtain your AVS? Mail a copy    Called and spoke with patient for her one month visit. Recovering from radiation therapy. Skin healing well, good ROM,  No edema, minimal discomfort. Still doing good skin cares. Will f/u with medical oncology in the fall with mammogram. Meds and medical history updated. No new issues or concerns.   Phone call duration: 10 minutes    Letha Menjivar RN      "

## 2020-07-20 DIAGNOSIS — I10 HYPERTENSION GOAL BP (BLOOD PRESSURE) < 140/90: ICD-10-CM

## 2020-07-21 NOTE — TELEPHONE ENCOUNTER
Routing refill request to provider for review/approval because:  BP not at goal    BP Readings from Last 6 Encounters:   04/01/20 (!) 155/93   03/25/20 (!) 152/91   03/18/20 (!) 167/98   02/24/20 (!) 165/89   02/11/20 (!) 147/77   02/10/20 (!) 148/86     Oralia TAMAYO RN, BSN

## 2020-07-22 RX ORDER — LISINOPRIL 20 MG/1
TABLET ORAL
Qty: 90 TABLET | Refills: 0 | Status: SHIPPED | OUTPATIENT
Start: 2020-07-22 | End: 2020-10-26

## 2020-09-11 PROBLEM — C50.911 INVASIVE DUCTAL CARCINOMA OF BREAST, RIGHT (H): Status: ACTIVE | Noted: 2019-12-02

## 2020-09-24 ENCOUNTER — HOSPITAL ENCOUNTER (OUTPATIENT)
Dept: LAB | Facility: CLINIC | Age: 70
End: 2020-09-24
Attending: INTERNAL MEDICINE
Payer: COMMERCIAL

## 2020-09-24 ENCOUNTER — HOSPITAL ENCOUNTER (OUTPATIENT)
Dept: MAMMOGRAPHY | Facility: CLINIC | Age: 70
End: 2020-09-24
Attending: INTERNAL MEDICINE
Payer: COMMERCIAL

## 2020-09-24 DIAGNOSIS — C50.911 RECURRENT BREAST CANCER, RIGHT (H): ICD-10-CM

## 2020-09-24 DIAGNOSIS — Z12.31 SCREENING MAMMOGRAM, ENCOUNTER FOR: ICD-10-CM

## 2020-09-24 LAB
ALBUMIN SERPL-MCNC: 4 G/DL (ref 3.4–5)
ALP SERPL-CCNC: 123 U/L (ref 40–150)
ALT SERPL W P-5'-P-CCNC: 27 U/L (ref 0–50)
ANION GAP SERPL CALCULATED.3IONS-SCNC: 4 MMOL/L (ref 3–14)
AST SERPL W P-5'-P-CCNC: 23 U/L (ref 0–45)
BASOPHILS # BLD AUTO: 0.1 10E9/L (ref 0–0.2)
BASOPHILS NFR BLD AUTO: 1.2 %
BILIRUB SERPL-MCNC: 0.5 MG/DL (ref 0.2–1.3)
BUN SERPL-MCNC: 10 MG/DL (ref 7–30)
CALCIUM SERPL-MCNC: 9.4 MG/DL (ref 8.5–10.1)
CANCER AG27-29 SERPL-ACNC: 52 U/ML (ref 0–39)
CHLORIDE SERPL-SCNC: 108 MMOL/L (ref 94–109)
CO2 SERPL-SCNC: 30 MMOL/L (ref 20–32)
CREAT SERPL-MCNC: 0.91 MG/DL (ref 0.52–1.04)
DIFFERENTIAL METHOD BLD: NORMAL
EOSINOPHIL # BLD AUTO: 0.3 10E9/L (ref 0–0.7)
EOSINOPHIL NFR BLD AUTO: 3.2 %
ERYTHROCYTE [DISTWIDTH] IN BLOOD BY AUTOMATED COUNT: 12.6 % (ref 10–15)
GFR SERPL CREATININE-BSD FRML MDRD: 63 ML/MIN/{1.73_M2}
GLUCOSE SERPL-MCNC: 77 MG/DL (ref 70–99)
HCT VFR BLD AUTO: 40.1 % (ref 35–47)
HGB BLD-MCNC: 13.6 G/DL (ref 11.7–15.7)
IMM GRANULOCYTES # BLD: 0 10E9/L (ref 0–0.4)
IMM GRANULOCYTES NFR BLD: 0.4 %
LYMPHOCYTES # BLD AUTO: 2.1 10E9/L (ref 0.8–5.3)
LYMPHOCYTES NFR BLD AUTO: 25.5 %
MCH RBC QN AUTO: 29.8 PG (ref 26.5–33)
MCHC RBC AUTO-ENTMCNC: 33.9 G/DL (ref 31.5–36.5)
MCV RBC AUTO: 88 FL (ref 78–100)
MONOCYTES # BLD AUTO: 0.7 10E9/L (ref 0–1.3)
MONOCYTES NFR BLD AUTO: 8.7 %
NEUTROPHILS # BLD AUTO: 4.9 10E9/L (ref 1.6–8.3)
NEUTROPHILS NFR BLD AUTO: 61 %
NRBC # BLD AUTO: 0 10*3/UL
NRBC BLD AUTO-RTO: 0 /100
PLATELET # BLD AUTO: 259 10E9/L (ref 150–450)
POTASSIUM SERPL-SCNC: 3.9 MMOL/L (ref 3.4–5.3)
PROT SERPL-MCNC: 8 G/DL (ref 6.8–8.8)
RBC # BLD AUTO: 4.56 10E12/L (ref 3.8–5.2)
SODIUM SERPL-SCNC: 142 MMOL/L (ref 133–144)
WBC # BLD AUTO: 8.1 10E9/L (ref 4–11)

## 2020-09-24 PROCEDURE — 86300 IMMUNOASSAY TUMOR CA 15-3: CPT | Performed by: INTERNAL MEDICINE

## 2020-09-24 PROCEDURE — 85025 COMPLETE CBC W/AUTO DIFF WBC: CPT | Performed by: INTERNAL MEDICINE

## 2020-09-24 PROCEDURE — 36415 COLL VENOUS BLD VENIPUNCTURE: CPT | Performed by: INTERNAL MEDICINE

## 2020-09-24 PROCEDURE — 80053 COMPREHEN METABOLIC PANEL: CPT | Performed by: INTERNAL MEDICINE

## 2020-09-24 PROCEDURE — 77067 SCR MAMMO BI INCL CAD: CPT

## 2020-09-28 ENCOUNTER — ONCOLOGY VISIT (OUTPATIENT)
Dept: ONCOLOGY | Facility: CLINIC | Age: 70
End: 2020-09-28
Attending: INTERNAL MEDICINE
Payer: COMMERCIAL

## 2020-09-28 VITALS
BODY MASS INDEX: 28.56 KG/M2 | RESPIRATION RATE: 16 BRPM | DIASTOLIC BLOOD PRESSURE: 93 MMHG | HEART RATE: 81 BPM | HEIGHT: 64 IN | TEMPERATURE: 97.4 F | SYSTOLIC BLOOD PRESSURE: 156 MMHG | OXYGEN SATURATION: 97 % | WEIGHT: 167.3 LBS

## 2020-09-28 DIAGNOSIS — C50.911 RECURRENT BREAST CANCER, RIGHT (H): Primary | ICD-10-CM

## 2020-09-28 DIAGNOSIS — R97.8 ELEVATED TUMOR MARKERS: ICD-10-CM

## 2020-09-28 PROCEDURE — 99214 OFFICE O/P EST MOD 30 MIN: CPT | Performed by: INTERNAL MEDICINE

## 2020-09-28 PROCEDURE — G0463 HOSPITAL OUTPT CLINIC VISIT: HCPCS

## 2020-09-28 ASSESSMENT — PAIN SCALES - GENERAL: PAINLEVEL: NO PAIN (0)

## 2020-09-28 ASSESSMENT — MIFFLIN-ST. JEOR: SCORE: 1257.84

## 2020-09-28 NOTE — PROGRESS NOTES
Oncology follow-up visit    CHIEF COMPLAINT AND REASON FOR VISIT:   Right breast cancer , ,        HISTORY OF ONCOLOGY ILLNESS: Milly Loaiza was diagnosed first time end of  through screening mammogram right breast cancer, lumpectomy 2004 T2N0M0 poorly differentiated infiltrating ductal cancer, ER negative, HER-2 negative.   She had 3 cycles of AC, could not tolerate anymore chemotherapy, followed by radiation.    She had a second breast cancer on the right side diagnosed screening mammogram 2008. Biopsy 2009 at [facility] indicating high-grade DCIS with necrosis, not enough tissue for ER/[place] studies. Pathology from lumpectomy 2009 no residual invasive cancer.      She has abnormal screening mammogram 2019 which found right breast 0.9 cm focal asymmetry 2-3 o'clock in the right breast 2.7 cm from the nipple.  US biopsy found IDC grade III, ER/[place]-, Her 2 FISH equivocal, IHC 1+, so combined Her2 final reading is negative.      Due to the small size of the lesion, her prior poor tolerance to chemotherapy, upfront surgical lumpectomy is offered 2020 found IDC 1 cm, grade 2, no LVI, pT1bNx. No LN removed due to prior LND surgery.    She finished right postlumpectomy radiation 2020    INTERVAL HISTORY:  She made informed decision multiple times not proceeding with adjuvant chemotherapy.      PAST MEDICAL HISTORY: Nothing other than breast cancer.     MEDICATIONS: No routine medication other than multivitamin.     ALLERGIES: Sulfa.     SOCIAL HISTORY: She lives with her  at home.  She has a lot of animals to take care of.     FAMILY HISTORY: Paternal grandmother was diagnosed with breast cancer and  from that at age 68 back in the 60s.     HABITS: Never smoked. Does not use alcohol.       REVIEW OF SYSTEMS:   She has no complains.     PHYSICAL EXAMINATION:   VITAL SIGNS: Blood pressure (!) 156/93, pulse 81, temperature 97.4  F (36.3  C), temperature source Oral,  "resp. rate 16, height 1.632 m (5' 4.25\"), weight 75.9 kg (167 lb 4.8 oz), SpO2 97 %, not currently breastfeeding.  ECO     GENERAL APPEARANCE: Looks like her stated age, not in acute distress.   HEENT: The patient is normocephalic, atraumatic. Pupils are equal react to light. Sclerae are anicteric. Moist oral mucosa. Negative pharynx. No oral thrush. NECK: Supple. No jugular venous distention. Thyroid is not palpable.   LYMPH NODES: Superficial lymphadenopathy is not appreciable in the bilateral cervical, supraclavicular, axillary or inguinal adenopathy. CARDIOVASCULAR: S1, S2 regular with no murmurs or gallops. No carotid or abdominal bruits. PULMONARY: Lungs are clear to auscultation and percussion bilaterally. There is no wheezing or rhonchi. GASTROINTESTINAL: Abdomen is soft, nontender. No hepatosplenomegaly. No signs of ascites. No mass appreciable. MUSCULOSKELETAL/EXTREMITIES: No edema. No cyanotic changes. No signs of joint deformity. No lymphedema. NEUROLOGIC: Cranial nerves II-XII are grossly intact. Sensation intact. Muscle strength and muscle tone symmetrical all through 5/5. BACK: No spinal or paraspinal tenderness. No CVA tenderness.   SKIN: No petechiae. No rash. No signs of cellulitis.   BREASTS: She has retracted nipple on the right side. No palpable lesion.  Left breast exam is negative.     CURRENT LAB DATA REVIEWED TODAY:  CA [id number] at 52 in 2020, 36 in 2019, 44 in 2018,  43 in   Cbc diff and CMP are fine    Lumpectomy is offered 2020 found IDC 1 cm, grade 2, no LVI, no LN removed due to prior surgery, pT1bNx.    2019 BRCA actionable panel is negative.     CURRENT IMAGE DATA REVIEWED;  MA 2020 - negative       OLD DATA REVIEW IN SUMMARY TODAY  [id number] at 44 in 2019, at  48 in 10/2017, at 50 in 10/2016, at 36 in 10/2015, at 46 in 2014, 46 in 10/2014  from nl, at 41 in .  2019 right breast 2-3 o'clock in the right breast 2.7 cm from the nipple 0.9 cm nodule US biopsy " found IDC grade III, Er/[place]-, Her 2 FISH equivocal, IHC pending.   MA 9/2019- There is a 0.9 cm focal asymmetry at 2-3:00 in the right breast and 2.7 cm from the nipple.       ASSESSMENT AND PLAN:    1. Three-time right breast cancer survivor. First time was triple negative breast cancer. The second one was DCIS.   The third time is triple negative T1b lesion status post lumpectomy and postlumpectomy radiation.      She made informed decision not to proceed with chemo.   We discussed the follow-up plan.     2. Elevation of her tumor marker , up and down, is chronic.   We discussed ASCO guidelines on tumor marker use and how we utilize it in breast cancer surveillance follow-up visits.  Advice her to repeat it in 6 wks.     VISIT time is 25 minutes, more than 15 minutes spent in discussing her hx of recurrent right breast cancer ,  Labs result, further plan of follow-up.

## 2020-09-28 NOTE — PROGRESS NOTES
"Oncology Rooming Note    September 28, 2020 2:35 PM   Milly Loaiza is a 70 year old female who presents for:    Chief Complaint   Patient presents with     Oncology Clinic Visit     Return Recurrent breast cancer, right , review labs and  Mammogram      Initial Vitals: BP (!) 156/93 (BP Location: Left arm, Patient Position: Sitting, Cuff Size: Adult Regular)   Pulse 81   Temp 97.4  F (36.3  C) (Oral)   Resp 16   Ht 1.632 m (5' 4.25\")   Wt 75.9 kg (167 lb 4.8 oz)   LMP  (LMP Unknown)   SpO2 97%   BMI 28.49 kg/m   Estimated body mass index is 28.49 kg/m  as calculated from the following:    Height as of this encounter: 1.632 m (5' 4.25\").    Weight as of this encounter: 75.9 kg (167 lb 4.8 oz). Body surface area is 1.85 meters squared.  No Pain (0) Comment: Data Unavailable   No LMP recorded (lmp unknown). Patient is postmenopausal.  Allergies reviewed: Yes  Medications reviewed: Yes    Medications: Medication refills not needed today.  Pharmacy name entered into [employer]: [facility] [place], [place] - [address]    Clinical concerns:  Return Recurrent breast cancer, right , review labs and  Mammogram.     Annamaria Pradhan CMA              "

## 2020-09-28 NOTE — LETTER
"    9/28/2020         RE: Milly Loaiza  [address] [facility] [address]        Dear Colleague,    Thank you for referring your patient, Milly Loaiza, to the [facility]. Please see a copy of my visit note below.    Oncology Rooming Note    September 28, 2020 2:35 PM   Milly Loaiza is a 70 year old female who presents for:    Chief Complaint   Patient presents with     Oncology Clinic Visit     Return Recurrent breast cancer, right , review labs and  Mammogram      Initial Vitals: BP (!) 156/93 (BP Location: Left arm, Patient Position: Sitting, Cuff Size: Adult Regular)   Pulse 81   Temp 97.4  F (36.3  C) (Oral)   Resp 16   Ht 1.632 m (5' 4.25\")   Wt 75.9 kg (167 lb 4.8 oz)   LMP  (LMP Unknown)   SpO2 97%   BMI 28.49 kg/m   Estimated body mass index is 28.49 kg/m  as calculated from the following:    Height as of this encounter: 1.632 m (5' 4.25\").    Weight as of this encounter: 75.9 kg (167 lb 4.8 oz). Body surface area is 1.85 meters squared.  No Pain (0) Comment: Data Unavailable   No LMP recorded (lmp unknown). Patient is postmenopausal.  Allergies reviewed: Yes  Medications reviewed: Yes    Medications: Medication refills not needed today.  Pharmacy name entered into [employer]: [facility] [address]    Clinical concerns:  Return Recurrent breast cancer, right , review labs and  Mammogram.     Annamaria Pradhan CMA                Oncology follow-up visit    CHIEF COMPLAINT AND REASON FOR VISIT:   Right breast cancer 2003, 2009, 2019       HISTORY OF ONCOLOGY ILLNESS: Milly Loaiza was diagnosed first time end of 2003 through screening mammogram right breast cancer, lumpectomy 02/2004 T2N0M0 poorly differentiated infiltrating ductal cancer, ER negative, HER-2 negative.   She had 3 cycles of AC, could not tolerate anymore chemotherapy, followed by radiation.    She had a second breast cancer on the right side diagnosed screening " "mammogram 2008. Biopsy 2009 at [facility] indicating high-grade DCIS with necrosis, not enough tissue for ER/[place] studies. Pathology from lumpectomy 2009 no residual invasive cancer.      She has abnormal screening mammogram 2019 which found right breast 0.9 cm focal asymmetry 2-3 o'clock in the right breast 2.7 cm from the nipple.  US biopsy found IDC grade III, ER/[place]-, Her 2 FISH equivocal, IHC 1+, so combined Her2 final reading is negative.      Due to the small size of the lesion, her prior poor tolerance to chemotherapy, upfront surgical lumpectomy is offered 2020 found IDC 1 cm, grade 2, no LVI, pT1bNx. No LN removed due to prior LND surgery.    She finished right postlumpectomy radiation 2020    INTERVAL HISTORY:  She made informed decision multiple times not proceeding with adjuvant chemotherapy.      PAST MEDICAL HISTORY: Nothing other than breast cancer.     MEDICATIONS: No routine medication other than multivitamin.     ALLERGIES: Sulfa.     SOCIAL HISTORY: She lives with her  at home.  She has a lot of animals to take care of.     FAMILY HISTORY: Paternal grandmother was diagnosed with breast cancer and  from that at age 68 back in the 60s.     HABITS: Never smoked. Does not use alcohol.       REVIEW OF SYSTEMS:   She has no complains.     PHYSICAL EXAMINATION:   VITAL SIGNS: Blood pressure (!) 156/93, pulse 81, temperature 97.4  F (36.3  C), temperature source Oral, resp. rate 16, height 1.632 m (5' 4.25\"), weight 75.9 kg (167 lb 4.8 oz), SpO2 97 %, not currently breastfeeding.  ECO     GENERAL APPEARANCE: Looks like her stated age, not in acute distress.   HEENT: The patient is normocephalic, atraumatic. Pupils are equal react to light. Sclerae are anicteric. Moist oral mucosa. Negative pharynx. No oral thrush. NECK: Supple. No jugular venous distention. Thyroid is not palpable.   LYMPH NODES: Superficial lymphadenopathy is not appreciable in the bilateral " cervical, supraclavicular, axillary or inguinal adenopathy. CARDIOVASCULAR: S1, S2 regular with no murmurs or gallops. No carotid or abdominal bruits. PULMONARY: Lungs are clear to auscultation and percussion bilaterally. There is no wheezing or rhonchi. GASTROINTESTINAL: Abdomen is soft, nontender. No hepatosplenomegaly. No signs of ascites. No mass appreciable. MUSCULOSKELETAL/EXTREMITIES: No edema. No cyanotic changes. No signs of joint deformity. No lymphedema. NEUROLOGIC: Cranial nerves II-XII are grossly intact. Sensation intact. Muscle strength and muscle tone symmetrical all through 5/5. BACK: No spinal or paraspinal tenderness. No CVA tenderness.   SKIN: No petechiae. No rash. No signs of cellulitis.   BREASTS: She has retracted nipple on the right side. No palpable lesion.  Left breast exam is negative.     CURRENT LAB DATA REVIEWED TODAY:  CA [id number] at 52 in 9/2020, 36 in 9/2019, 44 in 9/2018,  43 in 2016  Cbc diff and CMP are fine    Lumpectomy is offered 1/2020 found IDC 1 cm, grade 2, no LVI, no LN removed due to prior surgery, pT1bNx.    12/2019 BRCA actionable panel is negative.     CURRENT IMAGE DATA REVIEWED;  MA 9/2020 - negative       OLD DATA REVIEW IN SUMMARY TODAY  [id number] at 44 in 9/2019, at  48 in 10/2017, at 50 in 10/2016, at 36 in 10/2015, at 46 in 12/2014, 46 in 10/2014  from nl, at 41 in 2011.  12/2019 right breast 2-3 o'clock in the right breast 2.7 cm from the nipple 0.9 cm nodule US biopsy found IDC grade III, Er/[place]-, Her 2 FISH equivocal, IHC pending.   MA 9/2019- There is a 0.9 cm focal asymmetry at 2-3:00 in the right breast and 2.7 cm from the nipple.       ASSESSMENT AND PLAN:    1. Three-time right breast cancer survivor. First time was triple negative breast cancer. The second one was DCIS.   The third time is triple negative T1b lesion status post lumpectomy and postlumpectomy radiation.      She made informed decision not to proceed with chemo.   We discussed the follow-up plan.      2. Elevation of her tumor marker , up and down, is chronic.   We discussed ASCO guidelines on tumor marker use and how we utilize it in breast cancer surveillance follow-up visits.  Advice her to repeat it in 6 wks.     VISIT time is 25 minutes, more than 15 minutes spent in discussing her hx of recurrent right breast cancer ,  Labs result, further plan of follow-up.    Again, thank you for allowing me to participate in the care of your patient.        Sincerely,        Jacinta Lerma MD, MD

## 2020-10-23 DIAGNOSIS — I10 HYPERTENSION GOAL BP (BLOOD PRESSURE) < 140/90: ICD-10-CM

## 2020-10-26 RX ORDER — LISINOPRIL 40 MG/1
TABLET ORAL
Qty: 45 TABLET | Refills: 0 | Status: SHIPPED | OUTPATIENT
Start: 2020-10-26 | End: 2021-01-24

## 2020-10-26 NOTE — TELEPHONE ENCOUNTER
"Requested Prescriptions   Pending Prescriptions Disp Refills     lisinopril (ZESTRIL) 40 MG tablet [Pharmacy Med Name: LISINOPRIL 40MG TABS] 45 tablet 0     Sig: TAKE ONE-HALF TABLET BY MOUTH ONCE DAILY FOR BLOOD PRESSURE       ACE Inhibitors (Including Combos) Protocol Failed - 10/23/2020  3:10 PM        Failed - Blood pressure under 140/90 in past 12 months     BP Readings from Last 3 Encounters:   09/28/20 (!) 156/93   04/01/20 (!) 155/93   03/25/20 (!) 152/91                 Passed - Recent (12 mo) or future (30 days) visit within the authorizing provider's specialty     Patient has had an office visit with the authorizing provider or a provider within the authorizing providers department within the previous 12 mos or has a future within next 30 days. See \"Patient Info\" tab in inbasket, or \"Choose Columns\" in Meds & Orders section of the refill encounter.              Passed - Medication is active on med list        Passed - Patient is age 18 or older        Passed - No active pregnancy on record        Passed - Normal serum creatinine on file in past 12 months     Recent Labs   Lab Test 09/24/20  1511   CR 0.91       Ok to refill medication if creatinine is low          Passed - Normal serum potassium on file in past 12 months     Recent Labs   Lab Test 09/24/20  1511   POTASSIUM 3.9             Passed - No positive pregnancy test within past 12 months             "

## 2020-11-05 ENCOUNTER — OFFICE VISIT (OUTPATIENT)
Dept: RADIATION THERAPY | Facility: OUTPATIENT CENTER | Age: 70
End: 2020-11-05
Payer: COMMERCIAL

## 2020-11-05 VITALS
WEIGHT: 168.6 LBS | DIASTOLIC BLOOD PRESSURE: 79 MMHG | SYSTOLIC BLOOD PRESSURE: 143 MMHG | HEART RATE: 87 BPM | BODY MASS INDEX: 28.72 KG/M2 | RESPIRATION RATE: 18 BRPM | OXYGEN SATURATION: 97 %

## 2020-11-05 DIAGNOSIS — C50.911 MALIGNANT NEOPLASM OF RIGHT BREAST IN FEMALE, ESTROGEN RECEPTOR NEGATIVE, UNSPECIFIED SITE OF BREAST (H): Primary | ICD-10-CM

## 2020-11-05 DIAGNOSIS — Z17.1 MALIGNANT NEOPLASM OF RIGHT BREAST IN FEMALE, ESTROGEN RECEPTOR NEGATIVE, UNSPECIFIED SITE OF BREAST (H): Primary | ICD-10-CM

## 2020-11-05 ASSESSMENT — PAIN SCALES - GENERAL: PAINLEVEL: NO PAIN (0)

## 2020-11-05 NOTE — NURSING NOTE
FOLLOW-UP VISIT    Patient Name: Milly Loaiza      : 1950     Age: 70 year old        ______________________________________________________________________________     Chief Complaint   Patient presents with     Radiation Therapy     follow up     BP (!) 143/79   Pulse 87   Resp 18   Wt 76.5 kg (168 lb 9.6 oz)   LMP  (LMP Unknown)   SpO2 97%   BMI 28.72 kg/m       Date Radiation Completed: 20  R breast, triple negative    Pain  Denies    Meds  Current Med List Reviewed: Yes  Medication Note:     Skin: Warm  Dry  Intact  not using any lotions    Range of Motion: no complaints. Works out in the barn - horses, cats, farm work    Respiratory: No shortness of breath, dyspnea on exertion, cough, or hemoptysis    Hormone Therapy: No, triple negative    Lymphedema Follow up: No    Energy Level: intermittent evening fatigue after a full day, this has improved as she has gotten further out from radiation      Appointments:     DATE  Oncologist: Dr.. HARVEY    Surgeon:    Primary:      Other Notes:

## 2020-11-05 NOTE — LETTER
2020         RE: Milly Loaiza  [address] [facility] [address]        Dear Colleague,    Thank you for referring your patient, Milly Loaiza, to the [facility]. Please see a copy of my visit note below.       Department of Radiation Oncology  Radiation Therapy Center  [facility]  [place] [address]  [phone]         Radiation Oncology Follow-up Visit  2020      Milly Loaiza  MRN: [id number]   : 1950     DIAGNOSIS: invasive ductal carcinoma of the right breast, recurrent, status post lumpectomy  PATHOLOGY:  Final pathology demonstrated IDC, grade 2, no LVSI, negative margins, triple negative, 1 cm in size                        STAGE: pathologic T1bNX   INTENT OF RADIOTHERAPY:  adjuvant whole breast in setting of reirradiation.   CONCURRENT SYSTEMIC THERAPY: No     ONCOLOGIC HISTORY:    Ms. Loaiza is a 70 year old female invasive ductal carcinoma of the right breast, recurrent, status post lumpectomy.  Final pathology demonstrated IDC, grade 2, no LVSI, negative margins, triple negative, 1 cm in size, pathologic T1bNX.        Patient's oncologic history dates to  when she was diagnosed with right breast invasive ductal carcinoma.  She underwent lumpectomy on 2004.  Final pathology demonstrated IDC, 2.5 cm, no LVSI, negative margins, 0 out of 11 sentinel lymph nodes, triple negative breast cancer.  The patient subsequent completed adjuvant systemic therapy with AC and subsequent postoperative radiation therapy to the right breast.  She received a total dose of 60.4 Gy in 33 treatments, completing radiation in 2004.  The patient had recurrent right breast high-grade DCIS, diagnosed in 2009.  Subsequent wide local excision on 3/23/2009 demonstrated no evidence of residual DCIS or malignancy.  On 2019 the patient underwent right breast biopsy for an abnormality detected by imaging.  On pathology  demonstrated IDC, grade 3, no LVSI, ER negative, [place] negative, HER-2 negative.  The patient met with our surgeon colleagues (Dr. King) who discussed mastectomy with patient given her prior multiple recurrences.  The patient was however adamant about  proceeding with lumpectomy at this time.  On 2012 the patient underwent right breast lumpectomy.  Final pathology demonstrated IDC, grade 2, no LVSI, negative margins, 1 cm in size, previous biopsy clip was obtained, pathologic T1bNX.  The patient met with Dr. Lerma of medical oncology who discussed consideration of adjuvant systemic therapy.  The patient declined systemic therapy at this time.          SITE OF TREATMENT: R breast     DATES  OF TREATMENT: 3/16/20-20     TOTAL DOSE OF TREATMENT: 4005 cGy     DOSE PER FRACTION OF TREATMENT: 267 cGy x 15 fractions       COMMENT/TOXICITY:  No acute trouble. Applying skin care. No pain. Skin with mild erythema without desquamation.                                                 INTERVAL SINCE COMPLETION OF MOST RECENT RADIATION THERAPY:   7 months    SUBJECTIVE:   Milly Loaiza is a 70 year old female who is scheduled today for routine follow up after completing radiation therapy.    Mammogram on 20 was LILI.     She is otherwise doing well. No acute complaints. Mild fatigue post RT has resolved. No breast pain. No palpable breast masses. No extremity lymphedema. No issues with ROM.    ROS otherwise negative on a 12-system review.         PHYSICAL EXAM:  BP (!) 143/79   Pulse 87   Resp 18   Wt 76.5 kg (168 lb 9.6 oz)   LMP  (LMP Unknown)   SpO2 97%   BMI 28.72 kg/m    NAD  Right breast with moderate fibrosis. Treated right breast is reduced in volume and raised compared to contralateral side. No palpable breast masses bilaterally.   No extremity lymphedema. No issues with ROM.     LABS AND IMAGIN20  Mammogram    IMPRESSION: BI-RADS CATEGORY: 1 -  Negative     RECOMMENDED FOLLOW-UP: Annual  Mammography.    IMPRESSION:   Ms. Loaiza is a 70 year old female with a  invasive ductal carcinoma of the right breast, recurrent, status post lumpectomy. Final pathology demonstrated IDC, grade 2, no LVSI, negative margins, triple negative, 1 cm in size, pathologic T1bNX. Completed adjuvant whole breast in setting of reirradiation with 4005 cGy to R Breast from 3/16/20-4/03/20 .     PLAN:   1. Clinically and radiographically LILI.     2. Recommend gentle massage for area of breast fibrosis.     3. Continue follow up with Dr. Lerma. Defer imaging to medical oncology.     4. RTC in 12 months.     Evert Segura M.D.  Department of Radiation Oncology  [facility]

## 2020-11-06 NOTE — PROGRESS NOTES
Department of Radiation Oncology  Radiation Therapy Center  [facility] [place] [address]  [phone]         Radiation Oncology Follow-up Visit  2020      Milly oLaiza  MRN: [id number]   : 1950     DIAGNOSIS: invasive ductal carcinoma of the right breast, recurrent, status post lumpectomy  PATHOLOGY:  Final pathology demonstrated IDC, grade 2, no LVSI, negative margins, triple negative, 1 cm in size                        STAGE: pathologic T1bNX   INTENT OF RADIOTHERAPY:  adjuvant whole breast in setting of reirradiation.   CONCURRENT SYSTEMIC THERAPY: No     ONCOLOGIC HISTORY:    Ms. Loaiza is a 70 year old female invasive ductal carcinoma of the right breast, recurrent, status post lumpectomy.  Final pathology demonstrated IDC, grade 2, no LVSI, negative margins, triple negative, 1 cm in size, pathologic T1bNX.        Patient's oncologic history dates to  when she was diagnosed with right breast invasive ductal carcinoma.  She underwent lumpectomy on 2004.  Final pathology demonstrated IDC, 2.5 cm, no LVSI, negative margins, 0 out of 11 sentinel lymph nodes, triple negative breast cancer.  The patient subsequent completed adjuvant systemic therapy with AC and subsequent postoperative radiation therapy to the right breast.  She received a total dose of 60.4 Gy in 33 treatments, completing radiation in 2004.  The patient had recurrent right breast high-grade DCIS, diagnosed in 2009.  Subsequent wide local excision on 3/23/2009 demonstrated no evidence of residual DCIS or malignancy.  On 2019 the patient underwent right breast biopsy for an abnormality detected by imaging.  On pathology demonstrated IDC, grade 3, no LVSI, ER negative, WI negative, HER-2 negative.  The patient met with our surgeon colleagues (Dr. King) who discussed mastectomy with patient given her prior multiple recurrences.  The patient was however adamant  about  proceeding with lumpectomy at this time.  On 2012 the patient underwent right breast lumpectomy.  Final pathology demonstrated IDC, grade 2, no LVSI, negative margins, 1 cm in size, previous biopsy clip was obtained, pathologic T1bNX.  The patient met with Dr. Lerma of medical oncology who discussed consideration of adjuvant systemic therapy.  The patient declined systemic therapy at this time.          SITE OF TREATMENT: R breast     DATES  OF TREATMENT: 3/16/20-20     TOTAL DOSE OF TREATMENT: 4005 cGy     DOSE PER FRACTION OF TREATMENT: 267 cGy x 15 fractions       COMMENT/TOXICITY:  No acute trouble. Applying skin care. No pain. Skin with mild erythema without desquamation.                                                 INTERVAL SINCE COMPLETION OF MOST RECENT RADIATION THERAPY:   7 months    SUBJECTIVE:   Milly Loaiza is a 70 year old female who is scheduled today for routine follow up after completing radiation therapy.    Mammogram on 20 was LILI.     She is otherwise doing well. No acute complaints. Mild fatigue post RT has resolved. No breast pain. No palpable breast masses. No extremity lymphedema. No issues with ROM.    ROS otherwise negative on a 12-system review.         PHYSICAL EXAM:  BP (!) 143/79   Pulse 87   Resp 18   Wt 76.5 kg (168 lb 9.6 oz)   LMP  (LMP Unknown)   SpO2 97%   BMI 28.72 kg/m    NAD  Right breast with moderate fibrosis. Treated right breast is reduced in volume and raised compared to contralateral side. No palpable breast masses bilaterally.   No extremity lymphedema. No issues with ROM.     LABS AND IMAGIN20  Mammogram    IMPRESSION: BI-RADS CATEGORY: 1 -  Negative     RECOMMENDED FOLLOW-UP: Annual Mammography.    IMPRESSION:   Ms. Loaiza is a 70 year old female with a  invasive ductal carcinoma of the right breast, recurrent, status post lumpectomy. Final pathology demonstrated IDC, grade 2, no LVSI, negative margins, triple negative, 1 cm in  size, pathologic T1bNX. Completed adjuvant whole breast in setting of reirradiation with 4005 cGy to R Breast from 3/16/20-4/03/20 .     PLAN:   1. Clinically and radiographically LILI.     2. Recommend gentle massage for area of breast fibrosis.     3. Continue follow up with Dr. Lerma. Defer imaging to medical oncology.     4. RTC in 12 months.     Evert Segura M.D.  Department of Radiation Oncology  [facility]

## 2020-11-09 ENCOUNTER — PATIENT OUTREACH (OUTPATIENT)
Dept: ONCOLOGY | Facility: CLINIC | Age: 70
End: 2020-11-09

## 2020-11-09 ENCOUNTER — HOSPITAL ENCOUNTER (OUTPATIENT)
Dept: LAB | Facility: CLINIC | Age: 70
Discharge: HOME | End: 2020-11-09
Attending: INTERNAL MEDICINE | Admitting: INTERNAL MEDICINE
Payer: COMMERCIAL

## 2020-11-09 DIAGNOSIS — C50.911 RECURRENT BREAST CANCER, RIGHT (H): Primary | ICD-10-CM

## 2020-11-09 DIAGNOSIS — C50.911 RECURRENT BREAST CANCER, RIGHT (H): ICD-10-CM

## 2020-11-09 DIAGNOSIS — R97.8 ELEVATED TUMOR MARKERS: ICD-10-CM

## 2020-11-09 LAB
ALBUMIN SERPL-MCNC: 3.8 G/DL (ref 3.4–5)
ALP SERPL-CCNC: 124 U/L (ref 40–150)
ALT SERPL W P-5'-P-CCNC: 25 U/L (ref 0–50)
ANION GAP SERPL CALCULATED.3IONS-SCNC: 2 MMOL/L (ref 3–14)
AST SERPL W P-5'-P-CCNC: 21 U/L (ref 0–45)
BASOPHILS # BLD AUTO: 0.1 10E9/L (ref 0–0.2)
BASOPHILS NFR BLD AUTO: 1.5 %
BILIRUB SERPL-MCNC: 0.2 MG/DL (ref 0.2–1.3)
BUN SERPL-MCNC: 11 MG/DL (ref 7–30)
CALCIUM SERPL-MCNC: 9.1 MG/DL (ref 8.5–10.1)
CANCER AG27-29 SERPL-ACNC: 33 U/ML (ref 0–39)
CHLORIDE SERPL-SCNC: 110 MMOL/L (ref 94–109)
CO2 SERPL-SCNC: 28 MMOL/L (ref 20–32)
CREAT SERPL-MCNC: 0.89 MG/DL (ref 0.52–1.04)
DIFFERENTIAL METHOD BLD: NORMAL
EOSINOPHIL # BLD AUTO: 0.3 10E9/L (ref 0–0.7)
EOSINOPHIL NFR BLD AUTO: 5.1 %
ERYTHROCYTE [DISTWIDTH] IN BLOOD BY AUTOMATED COUNT: 12.4 % (ref 10–15)
GFR SERPL CREATININE-BSD FRML MDRD: 65 ML/MIN/{1.73_M2}
GLUCOSE SERPL-MCNC: 107 MG/DL (ref 70–99)
HCT VFR BLD AUTO: 36.6 % (ref 35–47)
HGB BLD-MCNC: 12.1 G/DL (ref 11.7–15.7)
IMM GRANULOCYTES # BLD: 0 10E9/L (ref 0–0.4)
IMM GRANULOCYTES NFR BLD: 0.3 %
LYMPHOCYTES # BLD AUTO: 1.8 10E9/L (ref 0.8–5.3)
LYMPHOCYTES NFR BLD AUTO: 26.6 %
MCH RBC QN AUTO: 29.5 PG (ref 26.5–33)
MCHC RBC AUTO-ENTMCNC: 33.1 G/DL (ref 31.5–36.5)
MCV RBC AUTO: 89 FL (ref 78–100)
MONOCYTES # BLD AUTO: 0.6 10E9/L (ref 0–1.3)
MONOCYTES NFR BLD AUTO: 8.8 %
NEUTROPHILS # BLD AUTO: 3.8 10E9/L (ref 1.6–8.3)
NEUTROPHILS NFR BLD AUTO: 57.7 %
NRBC # BLD AUTO: 0 10*3/UL
NRBC BLD AUTO-RTO: 0 /100
PLATELET # BLD AUTO: 240 10E9/L (ref 150–450)
POTASSIUM SERPL-SCNC: 3.9 MMOL/L (ref 3.4–5.3)
PROT SERPL-MCNC: 7.4 G/DL (ref 6.8–8.8)
RBC # BLD AUTO: 4.1 10E12/L (ref 3.8–5.2)
SODIUM SERPL-SCNC: 140 MMOL/L (ref 133–144)
WBC # BLD AUTO: 6.6 10E9/L (ref 4–11)

## 2020-11-09 PROCEDURE — 36415 COLL VENOUS BLD VENIPUNCTURE: CPT | Performed by: INTERNAL MEDICINE

## 2020-11-09 PROCEDURE — 80053 COMPREHEN METABOLIC PANEL: CPT | Performed by: INTERNAL MEDICINE

## 2020-11-09 PROCEDURE — 85025 COMPLETE CBC W/AUTO DIFF WBC: CPT | Performed by: INTERNAL MEDICINE

## 2020-11-09 PROCEDURE — 86300 IMMUNOASSAY TUMOR CA 15-3: CPT | Performed by: INTERNAL MEDICINE

## 2020-11-13 NOTE — PROGRESS NOTES
Message left requesting patient to return call to clinic for lab results. Direct line provided. Kacey Loaiza RN on 11/13/2020 at 11:31 AM    Per Dr. Lerma, labs are improving. Keep labs and appts as scheduled in March.

## 2020-11-13 NOTE — PROGRESS NOTES
Patient returned call to clinic. Reviewed results and Dr. Lerma's recommendations. Denies questions or concerns and is in agreement with plan. Advised to call back if any arise. Direct line provided. Kacey Loaiza RN on 11/13/2020 at 2:34 PM

## 2021-01-21 DIAGNOSIS — I10 HYPERTENSION GOAL BP (BLOOD PRESSURE) < 140/90: ICD-10-CM

## 2021-01-23 NOTE — TELEPHONE ENCOUNTER
Routing refill request to provider for review/approval because:  Patient needs to be seen because it has been more than 1 year since last office visit.  Blood pressure not at goal      Anna Branch RN

## 2021-01-24 RX ORDER — LISINOPRIL 20 MG/1
TABLET ORAL
Qty: 90 TABLET | Refills: 0 | Status: SHIPPED | OUTPATIENT
Start: 2021-01-24 | End: 2021-04-20

## 2021-04-28 ENCOUNTER — ONCOLOGY VISIT (OUTPATIENT)
Dept: ONCOLOGY | Facility: CLINIC | Age: 71
End: 2021-04-28
Payer: COMMERCIAL

## 2021-04-28 VITALS
TEMPERATURE: 97.4 F | HEART RATE: 89 BPM | BODY MASS INDEX: 29.04 KG/M2 | WEIGHT: 170.1 LBS | SYSTOLIC BLOOD PRESSURE: 147 MMHG | OXYGEN SATURATION: 97 % | DIASTOLIC BLOOD PRESSURE: 90 MMHG | RESPIRATION RATE: 16 BRPM | HEIGHT: 64 IN

## 2021-04-28 DIAGNOSIS — C50.911 MALIGNANT NEOPLASM OF RIGHT BREAST IN FEMALE, ESTROGEN RECEPTOR NEGATIVE, UNSPECIFIED SITE OF BREAST (H): ICD-10-CM

## 2021-04-28 DIAGNOSIS — Z12.31 ENCOUNTER FOR SCREENING MAMMOGRAM FOR BREAST CANCER: Primary | ICD-10-CM

## 2021-04-28 DIAGNOSIS — I10 ESSENTIAL HYPERTENSION, BENIGN: ICD-10-CM

## 2021-04-28 DIAGNOSIS — Z17.1 MALIGNANT NEOPLASM OF RIGHT BREAST IN FEMALE, ESTROGEN RECEPTOR NEGATIVE, UNSPECIFIED SITE OF BREAST (H): ICD-10-CM

## 2021-04-28 PROCEDURE — 99214 OFFICE O/P EST MOD 30 MIN: CPT | Performed by: NURSE PRACTITIONER

## 2021-04-28 ASSESSMENT — PAIN SCALES - GENERAL: PAINLEVEL: NO PAIN (0)

## 2021-04-28 ASSESSMENT — MIFFLIN-ST. JEOR: SCORE: 1270.54

## 2021-04-28 NOTE — PROGRESS NOTES
Oncology Follow Up Visit: April 28, 2021    Oncologist: Dr Abby Lerma/ Olga Lidia Arevalo,CNP  PCP: Nhung Navarro    Diagnosis: Right Breast Cancer - recurrent  Milly Loaiza is a 69 yo female initially diagnosed at end of 2003 with screening mammogram right breast cancer. She was then treated with right breast lumpectomy in 02/2004 T2N0M0 poorly differentiated infiltrating ductal cancer, ER negative, HER-2 negative( triple negative)  She had a second breast cancer on the right side diagnosed screening mammogram 12/2008. Biopsy 01/2009 at [facility] indicating high-grade DCIS with necrosis, not enough tissue for ER/NJ studies. Pathology from lumpectomy 03/2009 no residual invasive cancer.    She has abnormal screening mammogram September 2019 which found right breast 0.9 cm focal asymmetry 2-3 o'clock in the right breast 2.7 cm from the nipple.  US biopsy found IDC grade III, ER/NJ-, Her 2 FISH equivocal, IHC 1+, so combined Her2 final reading is negative.   Treatment:  2/2004 -Right breast lumpectomy   3/2004 - began ACx 3 cycles and could not tolerate anymore chemotherapy, followed by radiation.    3/2009 - right lumpectomy without residual invasive cancer  1/2020- Due to the small size of the lesion, her prior poor tolerance to chemotherapy, upfront surgical lumpectomy was completed then Right lumpectomy finished right postlumpectomy radiation April 20    Interval History: Ms Loaiza and  are seen today for yearly follow up to her breast cancer. Pt shares she has been feeling well with no new breast or chest issues. She does mention stress issues- bothered by new provider and family changes with mother in low going to nursing home- using deep breathing and activity to help with stress. Reports she is not sure if she will be getting Covid 19 vaccination. Pt stays active with horses at home .   Rest of comprehensive and complete ROS is reviewed and is negative.   Past Medical History:   Diagnosis Date      "Acute reaction to stress 10/9/2019     Malignant neoplasm of breast (female), unspecified site      Current Outpatient Medications   Medication     acetaminophen (TYLENOL) 325 MG tablet     aspirin 325 MG tablet     calcium carbonate (OS-LUCIAN 500 MG [place]. [place]) 500 MG tablet     DAILY MULTI VITAMIN/MINERALS OR     lisinopril (ZESTRIL) 20 MG tablet     No current facility-administered medications for this visit.      Allergies   Allergen Reactions     Dust Mites Unknown     Coconut Fatty Acids Itching and Rash     Purple blotches     Penicillins Rash     Sulfa Drugs Rash     Purple blotches       Physical Exam:BP (!) 147/90 (BP Location: Left arm)   Pulse 89   Temp 97.4  F (36.3  C) (Temporal)   Resp 16   Ht 1.632 m (5' 4.25\")   Wt 77.2 kg (170 lb 1.6 oz)   LMP  (LMP Unknown)   SpO2 97%   BMI 28.97 kg/m     Constitutional: Alert and in no distress.  Healthy and cooperative  ENT: Eyes bright , No mouth sores  Neck: Supple, No adenopathy.  Cardiac: Heart rate and rhythm is regular and strong without murmur  Respiratory: Breathing easy. Lung sounds clear to auscultation  Breasts: Right breast with several scars but is not tender and no new masses discharge noted.  Left breast without abnormalities.  GI: Abdomen is soft, non-tender, BS normal. No masses or organomegaly  MS: Muscle tone normal, extremities normal with no edema.   Skin: No suspicious lesions or rashes  Neuro: Sensory grossly WNL, gait normal.   Lymph: Normal ant/post cervical, axillary, supraclavicular nodes  Psych: Mentation appears normal and affect normal/bright and smiling    Laboratory Results: No labs completed today    Assessment and Plan:   Breast cancer- 3 x diagnosed with breast cancer-review and exam today showing no new signs of recurrence of disease.  Discussed that we will continue annual reviews on this patient-no labs necessary.  Breast cancer screening-last mammogram completed 9/24/2020 which was normal-we will repeat at 1 year-order " completed.  Genetics-only other family history of cancers in her grandmother with breast cancer in her 60s.  Patient reporting she has had genetic screening which has been negative.  Hypertension -patient currently on lisinopril-20 mg daily but reporting occasional cough.  Warned that she is overdue for review with PCP and she should report this finding.  Also note that her blood pressure was mildly elevated today but is probably related to anxiety of new clinic and new provider though suggested to patient she should get her blood pressure retested in near future review results.  Health maintenance-encouraged regular activity but she is active with the horses.  Also suggested annual physical with PCP for review of glucose cholesterol and female exams.  COVID-19 precautions-patient has not yet received her vaccination is not sure she wants to receive that at this time.  Discussed recommendation to receive vaccination.  Encouraged to continue precautions.  The total time of this encounter amounted to 30 minutes. This time included time spent with the patient, prep work, ordering tests, and performing post visit documentation.  Olga Lidia Arevalo,Cnp

## 2021-04-28 NOTE — NURSING NOTE
"Oncology Rooming Note    April 28, 2021 3:57 PM   Milly Loaiza is a 70 year old female who presents for:    Chief Complaint   Patient presents with     Oncology Clinic Visit     Follow up     Initial Vitals: BP (!) 147/90 (BP Location: Left arm)   Pulse 89   Temp 97.4  F (36.3  C) (Temporal)   Resp 16   Ht 1.632 m (5' 4.25\")   Wt 77.2 kg (170 lb 1.6 oz)   LMP  (LMP Unknown)   SpO2 97%   BMI 28.97 kg/m   Estimated body mass index is 28.97 kg/m  as calculated from the following:    Height as of this encounter: 1.632 m (5' 4.25\").    Weight as of this encounter: 77.2 kg (170 lb 1.6 oz). Body surface area is 1.87 meters squared.  No Pain (0) Comment: Data Unavailable   No LMP recorded (lmp unknown). Patient is postmenopausal.  Allergies reviewed: Yes  Medications reviewed: Yes    Medications: Medication refills not needed today.  Pharmacy name entered into [facility]:    [facility] - [place], [place] - [address] [place] - [address] [facility]    Clinical concerns: None       Bette Wang CMA            "

## 2021-04-28 NOTE — LETTER
4/28/2021         RE: Milly Loaiza  [address] [facility] [id number]        Dear Colleague,    Thank you for referring your patient, Milly Loaiza, to the [facility]. Please see a copy of my visit note below.    Oncology Follow Up Visit: April 28, 2021    Oncologist: Dr Abby Lerma/ Olga Lidia Arevalo,CNP  PCP: Nhung Navarro    Diagnosis: Right Breast Cancer - recurrent  Milly Loaiza is a 71 yo female initially diagnosed at end of 2003 with screening mammogram right breast cancer. She was then treated with right breast lumpectomy in 02/2004 T2N0M0 poorly differentiated infiltrating ductal cancer, ER negative, HER-2 negative( triple negative)  She had a second breast cancer on the right side diagnosed screening mammogram 12/2008. Biopsy 01/2009 at [facility] indicating high-grade DCIS with necrosis, not enough tissue for ER/[place] studies. Pathology from lumpectomy 03/2009 no residual invasive cancer.    She has abnormal screening mammogram September 2019 which found right breast 0.9 cm focal asymmetry 2-3 o'clock in the right breast 2.7 cm from the nipple.  US biopsy found IDC grade III, ER/[place]-, Her 2 FISH equivocal, IHC 1+, so combined Her2 final reading is negative.   Treatment:  2/2004 -Right breast lumpectomy   3/2004 - began ACx 3 cycles and could not tolerate anymore chemotherapy, followed by radiation.    3/2009 - right lumpectomy without residual invasive cancer  1/2020- Due to the small size of the lesion, her prior poor tolerance to chemotherapy, upfront surgical lumpectomy was completed then Right lumpectomy finished right postlumpectomy radiation April 20    Interval History: Ms Loaiza and  are seen today for yearly follow up to her breast cancer. Pt shares she has been feeling well with no new breast or chest issues. She does mention stress issues- bothered by new provider and family changes with mother in low going to nursing home- using deep  "breathing and activity to help with stress. Reports she is not sure if she will be getting Covid 19 vaccination. Pt stays active with horses at home .   Rest of comprehensive and complete ROS is reviewed and is negative.   Past Medical History:   Diagnosis Date     Acute reaction to stress 10/9/2019     Malignant neoplasm of breast (female), unspecified site      Current Outpatient Medications   Medication     acetaminophen (TYLENOL) 325 MG tablet     aspirin 325 MG tablet     calcium carbonate (OS-LUCIAN 500 MG [place]. [place]) 500 MG tablet     DAILY MULTI VITAMIN/MINERALS OR     lisinopril (ZESTRIL) 20 MG tablet     No current facility-administered medications for this visit.      Allergies   Allergen Reactions     Dust Mites Unknown     Coconut Fatty Acids Itching and Rash     Purple blotches     Penicillins Rash     Sulfa Drugs Rash     Purple blotches       Physical Exam:BP (!) 147/90 (BP Location: Left arm)   Pulse 89   Temp 97.4  F (36.3  C) (Temporal)   Resp 16   Ht 1.632 m (5' 4.25\")   Wt 77.2 kg (170 lb 1.6 oz)   LMP  (LMP Unknown)   SpO2 97%   BMI 28.97 kg/m     Constitutional: Alert and in no distress.  Healthy and cooperative  ENT: Eyes bright , No mouth sores  Neck: Supple, No adenopathy.  Cardiac: Heart rate and rhythm is regular and strong without murmur  Respiratory: Breathing easy. Lung sounds clear to auscultation  Breasts: Right breast with several scars but is not tender and no new masses discharge noted.  Left breast without abnormalities.  GI: Abdomen is soft, non-tender, BS normal. No masses or organomegaly  MS: Muscle tone normal, extremities normal with no edema.   Skin: No suspicious lesions or rashes  Neuro: Sensory grossly WNL, gait normal.   Lymph: Normal ant/post cervical, axillary, supraclavicular nodes  Psych: Mentation appears normal and affect normal/bright and smiling    Laboratory Results: No labs completed today    Assessment and Plan:   Breast cancer- 3 x diagnosed with breast " cancer-review and exam today showing no new signs of recurrence of disease.  Discussed that we will continue annual reviews on this patient-no labs necessary.  Breast cancer screening-last mammogram completed 9/24/2020 which was normal-we will repeat at 1 year-order completed.  Genetics-only other family history of cancers in her grandmother with breast cancer in her 60s.  Patient reporting she has had genetic screening which has been negative.  Hypertension -patient currently on lisinopril-20 mg daily but reporting occasional cough.  Warned that she is overdue for review with PCP and she should report this finding.  Also note that her blood pressure was mildly elevated today but is probably related to anxiety of new clinic and new provider though suggested to patient she should get her blood pressure retested in near future review results.  Health maintenance-encouraged regular activity but she is active with the horses.  Also suggested annual physical with PCP for review of glucose cholesterol and female exams.  COVID-19 precautions-patient has not yet received her vaccination is not sure she wants to receive that at this time.  Discussed recommendation to receive vaccination.  Encouraged to continue precautions.  The total time of this encounter amounted to 30 minutes. This time included time spent with the patient, prep work, ordering tests, and performing post visit documentation.  Olga Lidia Arevalo Cnp        Again, thank you for allowing me to participate in the care of your patient.        Sincerely,        Olga Lidia Arevalo NP, APRN CNP

## 2021-05-27 DIAGNOSIS — I10 HYPERTENSION GOAL BP (BLOOD PRESSURE) < 140/90: ICD-10-CM

## 2021-05-28 RX ORDER — LISINOPRIL 20 MG/1
20 TABLET ORAL DAILY
Qty: 7 TABLET | Refills: 0 | Status: SHIPPED | OUTPATIENT
Start: 2021-05-28 | End: 2021-06-01

## 2021-05-28 NOTE — TELEPHONE ENCOUNTER
Patient scheduled to follow up with Dr Navarro on 6-1-21.  Refilled for #7 with keep appointment reminder

## 2021-06-01 ENCOUNTER — OFFICE VISIT (OUTPATIENT)
Dept: FAMILY MEDICINE | Facility: CLINIC | Age: 71
End: 2021-06-01
Payer: COMMERCIAL

## 2021-06-01 VITALS
HEART RATE: 86 BPM | WEIGHT: 166 LBS | DIASTOLIC BLOOD PRESSURE: 83 MMHG | SYSTOLIC BLOOD PRESSURE: 137 MMHG | BODY MASS INDEX: 28.27 KG/M2 | TEMPERATURE: 98.7 F

## 2021-06-01 DIAGNOSIS — I63.81 LACUNAR INFARCTION (H): ICD-10-CM

## 2021-06-01 DIAGNOSIS — G45.9 TIA (TRANSIENT ISCHEMIC ATTACK): ICD-10-CM

## 2021-06-01 DIAGNOSIS — I10 HYPERTENSION GOAL BP (BLOOD PRESSURE) < 140/90: Primary | ICD-10-CM

## 2021-06-01 DIAGNOSIS — Z17.1 MALIGNANT NEOPLASM OF RIGHT BREAST IN FEMALE, ESTROGEN RECEPTOR NEGATIVE, UNSPECIFIED SITE OF BREAST (H): ICD-10-CM

## 2021-06-01 DIAGNOSIS — C50.911 MALIGNANT NEOPLASM OF RIGHT BREAST IN FEMALE, ESTROGEN RECEPTOR NEGATIVE, UNSPECIFIED SITE OF BREAST (H): ICD-10-CM

## 2021-06-01 PROCEDURE — 99214 OFFICE O/P EST MOD 30 MIN: CPT | Performed by: FAMILY MEDICINE

## 2021-06-01 RX ORDER — LISINOPRIL 20 MG/1
20 TABLET ORAL DAILY
Qty: 90 TABLET | Refills: 3 | Status: SHIPPED | OUTPATIENT
Start: 2021-06-01 | End: 2022-05-23

## 2021-06-01 NOTE — PATIENT INSTRUCTIONS
*   Stay on the lisinopril blood pressure pill. A 90 day supply plus 3 refills sent to our pharmacy.     *   I do have a medication called losartan and can get rid of the cough. Let me know.     *    I would strongly recommend getting the COVID shot. Stop by our pharmacy.     *   Next oncology appointment is April 22, 2022.     *   Your blood tests are current.     *    Yearly check up.

## 2021-06-01 NOTE — PROGRESS NOTES
"    Assessment & Plan     (I10) Hypertension goal BP (blood pressure) < 140/90  (primary encounter diagnosis)  Comment: Within guidelines using single drug therapy, ACE inhibitor.  Patient may have a dry cough, discussed the possibility of trying ARB therapy.  GFR 65 as of November 2020.  Plan: lisinopril (ZESTRIL) 20 MG tablet        Continue.  Recheck renal function this coming fall.    (C50.911,  Z17.1) Malignant neoplasm of right breast in female, estrogen receptor negative, unspecified site of breast (H)  Comment: No evidence of recurrence.  Plan: Continue follow-up with oncology.    (I63.81) Lacunar infarction (H)  Comment: No recurrence.  Patient currently on full dose aspirin, ACE inhibitor, and advised statin therapy declined per patient.  Plan: Monitor.    (G45.9) TIA (transient ischemic attack)  Comment: No recurrence.  Plan: Monitor.    No results found for any visits on 06/01/21.              BMI:   Estimated body mass index is 28.27 kg/m  as calculated from the following:    Height as of 4/28/21: 1.632 m (5' 4.25\").    Weight as of this encounter: 75.3 kg (166 lb).   Weight management plan: Discussed healthy diet and exercise guidelines        Return in about 1 year (around 6/1/2022) for Routine Visit.    Nhung Navarro MD  [facility] [place] is a 70 year old who presents for the following health issues  accompanied by her spouse:    HPI     Hypertension Follow-up  **Sherice would like noted that she does occasionally have a dry cough and believes it to be related to her bp medication but this does not seem to bother her as it is only occasionally.     Do you check your blood pressure regularly outside of the clinic? No     Are you following a low salt diet? Yes, low added salt     Are your blood pressures ever more than 140 on the top number (systolic) OR more   than 90 on the bottom number (diastolic), for example 140/90? No      How many servings of fruits and " vegetables do you eat daily?  1-2    On average, how many sweetened beverages do you drink each day (Examples: soda, juice, sweet tea, etc.  Do NOT count diet or artificially sweetened beverages)?   0-1    How many days per week do you exercise enough to make your heart beat faster? 3 or less    How many minutes a day do you exercise enough to make your heart beat faster? 9 or less    How many days per week do you miss taking your medication? 0        Review of Systems   CONSTITUTIONAL: NEGATIVE for fever, chills, change in weight  ENT/MOUTH: NEGATIVE for ear, mouth and throat problems  RESP: NEGATIVE for significant cough or SOB  CV: NEGATIVE for chest pain, palpitations or peripheral edema      Objective    /83 (BP Location: Left arm, Patient Position: Chair, Cuff Size: Adult Large)   Pulse 86   Temp 98.7  F (37.1  C) (Tympanic)   Wt 75.3 kg (166 lb)   LMP  (LMP Unknown)   BMI 28.27 kg/m    Body mass index is 28.27 kg/m .  Physical Exam   GENERAL: Healthy, alert and no distress  EYES: Eyes grossly normal to inspection, conjunctivae and sclerae normal  RESP: Lungs clear to auscultation - no rales, rhonchi or wheezes  CV: Regular rate and rhythm, normal S1 S2, no murmur  MS: No gross musculoskeletal defects noted, no edema  NEURO: Normal strength and tone, mentation intact and speech normal  PSYCH: Mentation appears normal, affect normal/bright     Nhung Navarro MD

## 2021-09-27 ENCOUNTER — HOSPITAL ENCOUNTER (OUTPATIENT)
Dept: MAMMOGRAPHY | Facility: CLINIC | Age: 71
Discharge: HOME | End: 2021-09-27
Attending: NURSE PRACTITIONER | Admitting: NURSE PRACTITIONER
Payer: COMMERCIAL

## 2021-09-27 DIAGNOSIS — Z12.31 ENCOUNTER FOR SCREENING MAMMOGRAM FOR BREAST CANCER: ICD-10-CM

## 2021-09-27 PROCEDURE — 77063 BREAST TOMOSYNTHESIS BI: CPT

## 2021-12-04 ENCOUNTER — APPOINTMENT (OUTPATIENT)
Dept: CT IMAGING | Facility: CLINIC | Age: 71
End: 2021-12-04
Attending: EMERGENCY MEDICINE
Payer: COMMERCIAL

## 2021-12-04 ENCOUNTER — APPOINTMENT (OUTPATIENT)
Dept: MRI IMAGING | Facility: CLINIC | Age: 71
End: 2021-12-04
Attending: EMERGENCY MEDICINE
Payer: COMMERCIAL

## 2021-12-04 ENCOUNTER — HOSPITAL ENCOUNTER (EMERGENCY)
Facility: CLINIC | Age: 71
Discharge: HOME | End: 2021-12-04
Attending: EMERGENCY MEDICINE | Admitting: EMERGENCY MEDICINE
Payer: COMMERCIAL

## 2021-12-04 VITALS
TEMPERATURE: 97.7 F | OXYGEN SATURATION: 98 % | WEIGHT: 155 LBS | RESPIRATION RATE: 18 BRPM | SYSTOLIC BLOOD PRESSURE: 159 MMHG | HEART RATE: 76 BPM | HEIGHT: 64 IN | BODY MASS INDEX: 26.46 KG/M2 | DIASTOLIC BLOOD PRESSURE: 85 MMHG

## 2021-12-04 DIAGNOSIS — R20.2 TINGLING: ICD-10-CM

## 2021-12-04 DIAGNOSIS — Z86.79 HISTORY OF HYPERTENSION: ICD-10-CM

## 2021-12-04 LAB
ANION GAP SERPL CALCULATED.3IONS-SCNC: 5 MMOL/L (ref 3–14)
BASOPHILS # BLD AUTO: 0.1 10E3/UL (ref 0–0.2)
BASOPHILS NFR BLD AUTO: 1 %
BUN SERPL-MCNC: 11 MG/DL (ref 7–30)
CA-I BLD-MCNC: 4.9 MG/DL (ref 4.4–5.2)
CALCIUM SERPL-MCNC: 9 MG/DL (ref 8.5–10.1)
CHLORIDE BLD-SCNC: 111 MMOL/L (ref 94–109)
CO2 SERPL-SCNC: 27 MMOL/L (ref 20–32)
CREAT SERPL-MCNC: 0.8 MG/DL (ref 0.52–1.04)
EOSINOPHIL # BLD AUTO: 0.2 10E3/UL (ref 0–0.7)
EOSINOPHIL NFR BLD AUTO: 3 %
ERYTHROCYTE [DISTWIDTH] IN BLOOD BY AUTOMATED COUNT: 12.5 % (ref 10–15)
GFR SERPL CREATININE-BSD FRML MDRD: 73 ML/MIN/1.73M2
GLUCOSE BLD-MCNC: 99 MG/DL (ref 70–99)
HCT VFR BLD AUTO: 39.4 % (ref 35–47)
HGB BLD-MCNC: 13 G/DL (ref 11.7–15.7)
HOLD SPECIMEN: NORMAL
HOLD SPECIMEN: NORMAL
IMM GRANULOCYTES # BLD: 0 10E3/UL
IMM GRANULOCYTES NFR BLD: 0 %
LYMPHOCYTES # BLD AUTO: 1.3 10E3/UL (ref 0.8–5.3)
LYMPHOCYTES NFR BLD AUTO: 27 %
MCH RBC QN AUTO: 29.2 PG (ref 26.5–33)
MCHC RBC AUTO-ENTMCNC: 33 G/DL (ref 31.5–36.5)
MCV RBC AUTO: 89 FL (ref 78–100)
MONOCYTES # BLD AUTO: 0.4 10E3/UL (ref 0–1.3)
MONOCYTES NFR BLD AUTO: 7 %
NEUTROPHILS # BLD AUTO: 3.1 10E3/UL (ref 1.6–8.3)
NEUTROPHILS NFR BLD AUTO: 62 %
NRBC # BLD AUTO: 0 10E3/UL
NRBC BLD AUTO-RTO: 0 /100
PLATELET # BLD AUTO: 254 10E3/UL (ref 150–450)
POTASSIUM BLD-SCNC: 4 MMOL/L (ref 3.4–5.3)
RBC # BLD AUTO: 4.45 10E6/UL (ref 3.8–5.2)
SODIUM SERPL-SCNC: 143 MMOL/L (ref 133–144)
T4 FREE SERPL-MCNC: 0.87 NG/DL (ref 0.76–1.46)
TSH SERPL DL<=0.005 MIU/L-ACNC: 4.59 MU/L (ref 0.4–4)
WBC # BLD AUTO: 5 10E3/UL (ref 4–11)

## 2021-12-04 PROCEDURE — 84443 ASSAY THYROID STIM HORMONE: CPT | Performed by: EMERGENCY MEDICINE

## 2021-12-04 PROCEDURE — 70450 CT HEAD/BRAIN W/O DYE: CPT

## 2021-12-04 PROCEDURE — 70553 MRI BRAIN STEM W/O & W/DYE: CPT

## 2021-12-04 PROCEDURE — A9585 GADOBUTROL INJECTION: HCPCS | Performed by: EMERGENCY MEDICINE

## 2021-12-04 PROCEDURE — 84439 ASSAY OF FREE THYROXINE: CPT | Performed by: EMERGENCY MEDICINE

## 2021-12-04 PROCEDURE — 85025 COMPLETE CBC W/AUTO DIFF WBC: CPT | Performed by: EMERGENCY MEDICINE

## 2021-12-04 PROCEDURE — 82330 ASSAY OF CALCIUM: CPT | Performed by: EMERGENCY MEDICINE

## 2021-12-04 PROCEDURE — 80048 BASIC METABOLIC PNL TOTAL CA: CPT | Performed by: EMERGENCY MEDICINE

## 2021-12-04 PROCEDURE — 99285 EMERGENCY DEPT VISIT HI MDM: CPT | Mod: 25 | Performed by: EMERGENCY MEDICINE

## 2021-12-04 PROCEDURE — 99284 EMERGENCY DEPT VISIT MOD MDM: CPT | Performed by: EMERGENCY MEDICINE

## 2021-12-04 PROCEDURE — 255N000002 HC RX 255 OP 636: Performed by: EMERGENCY MEDICINE

## 2021-12-04 PROCEDURE — 36415 COLL VENOUS BLD VENIPUNCTURE: CPT | Performed by: EMERGENCY MEDICINE

## 2021-12-04 RX ORDER — GADOBUTROL 604.72 MG/ML
7 INJECTION INTRAVENOUS ONCE
Status: COMPLETED | OUTPATIENT
Start: 2021-12-04 | End: 2021-12-04

## 2021-12-04 RX ADMIN — GADOBUTROL 7 ML: 604.72 INJECTION INTRAVENOUS at 12:30

## 2021-12-04 ASSESSMENT — MIFFLIN-ST. JEOR: SCORE: 1193.08

## 2021-12-04 ASSESSMENT — ENCOUNTER SYMPTOMS
RESPIRATORY NEGATIVE: 1
GASTROINTESTINAL NEGATIVE: 1
ENDOCRINE NEGATIVE: 1
MUSCULOSKELETAL NEGATIVE: 1
ALLERGIC/IMMUNOLOGIC NEGATIVE: 1
HEADACHES: 1
PSYCHIATRIC NEGATIVE: 1
CARDIOVASCULAR NEGATIVE: 1
EYES NEGATIVE: 1
HEMATOLOGIC/LYMPHATIC NEGATIVE: 1

## 2021-12-04 NOTE — DISCHARGE INSTRUCTIONS
1) The cause of your symptoms of tingling is reported over the last 3 days is not clear.  Your work-up and imaging did not suggest any evidence of a new stroke or any acute process. Prior stroke as noted in September 2018.    2) We have discussed that although the cause of your symptoms as reported and described is not clear if you have persistent tingling recheck with your clinic provider in 1 week is recommended.    3) during your visit your blood pressure was on the elevated side and it may suggest that your blood pressure medication may need to be adjusted. Be sure to follow-up with your clinic provider.    4) Although you appear stable for discharge to home at this time, if you develop new symptoms of concern you should return to be re-evaluarted.

## 2021-12-04 NOTE — ED TRIAGE NOTES
pt reports tingling all over; has been over night for the last few nights; pt also did have a headache last night

## 2021-12-04 NOTE — ED PROVIDER NOTES
History     Chief Complaint   Patient presents with     Tingling     pt reports tingling all over; has been over night for the last few nights; pt also did have a headache last night     HPI  Milly Loaiza is a 71 year old female who presents with report of generalized tingling and headache.  Patient has a history of TIA, prior diagnosis of a lacunar infarct hypertension and a history of invasive ductal carcinoma of the right breast.  She is currently prescribed 325 mg aspirin, lisinopril, multivitamins and calcium supplementation.  On examination patient was accompanied by her  Ronald from home in [facility].  Patient reports about 3 days ago she developed a sense of tingling about her body.  The tingling episodes are short-lived and fleeting and travel around her body.  She experienced an episode involving her left lower leg during her history and discussion.  She also reports she had a mild headache and has a history of sinus headaches last night.  Headache was fleeting and improved with over-the-counter Tylenol.  She has been compliant with her lisinopril.  Her  reports he is noted no speech difficulty she is reporting no visual changes, and no episodes of numbness or extremity weakness.  He was concerned that she continues to have tingling and wanted to come in to be evaluated.  There has been no fall or trauma.    Allergies:  Allergies   Allergen Reactions     Dust Mites Unknown     Coconut Fatty Acids Itching and Rash     Purple blotches     Penicillins Rash     Sulfa Drugs Rash     Purple blotches       Problem List:    Patient Active Problem List    Diagnosis Date Noted     Invasive ductal carcinoma of breast, right (H) 12/02/2019     Priority: Medium     Added automatically from request for surgery [id number]       Hypertension goal BP (blood pressure) < 140/90 10/09/2019     Priority: Medium     Lacunar infarction (H) 03/31/2019     Priority: Medium     Advanced directives,  counseling/discussion 04/05/2017     Priority: Medium     Advance Care Planning 4/5/2017: Information declined             TIA (transient ischemic attack) 03/29/2017     Priority: Medium     CARDIOVASCULAR SCREENING; LDL GOAL LESS THAN 160 10/31/2010     Priority: Medium     DCIS (ductal carcinoma in situ) 03/27/2009     Priority: Medium     Malignant neoplasm of female breast (H) 05/27/2005     Priority: Medium     Problem list name updated by automated process. Provider to review          Past Medical History:    Past Medical History:   Diagnosis Date     Acute reaction to stress 10/9/2019     Breast cancer (H)      Malignant neoplasm of breast (female), unspecified site        Past Surgical History:    Past Surgical History:   Procedure Laterality Date     BIOPSY BREAST       LUMPECTOMY BREAST Right 1/27/2020    Procedure: Right Wire Localized Breast Lumpectomy(wire placement at 8:30);  Surgeon: Gallo King DO;  Location: [facility] OR     SURGICAL HISTORY OF -   2003    Cervical biopsy     SURGICAL HISTORY OF -   1979    Tubal ligation     SURGICAL HISTORY OF -   2/2/2004    Right mastectomy     SURGICAL HISTORY OF -   3-23-09    Needle-guided left breast biopsy.       Family History:    Family History   Problem Relation Age of Onset     Alcohol/Drug Mother      Respiratory Mother         emphazyma     Cerebrovascular Disease Maternal Grandmother      Osteoporosis Maternal Grandmother      Breast Cancer Paternal Grandmother      Depression Sister      Diabetes Other      Cerebrovascular Disease Other      Cancer Other         lymphademia     Respiratory Other         emphazyma     Thyroid Disease Sister        Social History:  Marital Status:   [2]  Social History     Tobacco Use     Smoking status: Never Smoker     Smokeless tobacco: Never Used   Substance Use Topics     Alcohol use: Yes     Comment: occ.     Drug use: No        Medications:    acetaminophen (TYLENOL) 325 MG tablet  aspirin 325 MG  "tablet  calcium carbonate (OS-LUCIAN 500 MG [place]. [place]) 500 MG tablet  DAILY MULTI VITAMIN/MINERALS OR  lisinopril (ZESTRIL) 20 MG tablet          Review of Systems   Constitutional:        Tingling all over the body since Thursday   HENT: Negative.    Eyes: Negative.    Respiratory: Negative.    Cardiovascular: Negative.    Gastrointestinal: Negative.    Endocrine: Negative.    Genitourinary: Negative.    Musculoskeletal: Negative.    Skin: Negative.    Allergic/Immunologic: Negative.    Neurological: Positive for headaches (episode of headache last night).   Hematological: Negative.    Psychiatric/Behavioral: Negative.    All other systems reviewed and are negative.      Physical Exam   BP: (!) 189/98  Pulse: 91  Temp: 97.7  F (36.5  C)  Resp: 16  Height: 162.6 cm (5' 4\")  Weight: 70.3 kg (155 lb)  SpO2: 98 %      Physical Exam  Constitutional:       General: She is not in acute distress.     Appearance: Normal appearance. She is not ill-appearing, toxic-appearing or diaphoretic.   HENT:      Head: Normocephalic and atraumatic.      Nose: Nose normal.      Mouth/Throat:      Mouth: Mucous membranes are moist.   Eyes:      Extraocular Movements: Extraocular movements intact.      Pupils: Pupils are equal, round, and reactive to light.   Neck:      Vascular: No carotid bruit.   Cardiovascular:      Rate and Rhythm: Normal rate and regular rhythm.      Pulses: Normal pulses.      Heart sounds: Normal heart sounds.   Pulmonary:      Effort: Pulmonary effort is normal. No respiratory distress.      Breath sounds: Normal breath sounds. No stridor. No wheezing, rhonchi or rales.   Chest:      Chest wall: No tenderness.   Musculoskeletal:         General: No swelling, tenderness, deformity or signs of injury.      Cervical back: Normal range of motion and neck supple. No tenderness.      Right lower leg: No edema.      Left lower leg: No edema.   Skin:     Capillary Refill: Capillary refill takes less than 2 seconds.      " Coloration: Skin is not jaundiced or pale.      Findings: No bruising, erythema, lesion or rash.   Neurological:      General: No focal deficit present.      Mental Status: She is alert and oriented to person, place, and time.      GCS: GCS eye subscore is 4. GCS verbal subscore is 5. GCS motor subscore is 6.      Cranial Nerves: Cranial nerves are intact. No cranial nerve deficit.      Sensory: No sensory deficit.      Motor: No weakness, tremor, atrophy, abnormal muscle tone or pronator drift.      Coordination: Coordination normal.      Gait: Gait normal.      Deep Tendon Reflexes: Reflexes normal.   Psychiatric:         Mood and Affect: Mood normal.         Behavior: Behavior normal.         Thought Content: Thought content normal.         Judgment: Judgment normal.         ED Course                 Procedures              Critical Care time:  none               ED medications:   Medications   gadobutrol (GADAVIST) injection 7 mL (7 mLs Intravenous Given 12/4/21 1230)       ED Vitals:  Vitals:    12/04/21 1030 12/04/21 1045 12/04/21 1100 12/04/21 1200   BP: (!) 163/83 (!) 160/84 (!) 160/88 (!) 150/86   Pulse:  78 89 76   Resp:       Temp:       TempSrc:       SpO2: 97% 96% 98% 97%   Weight:       Height:             ED labs and imaging:  Results for orders placed or performed during the hospital encounter of 12/04/21   Head CT w/o contrast     Status: None    Narrative    EXAM: CT HEAD WITHOUT CONTRAST  LOCATION: [facility]  DATE/TIME: 12/04/2021, 1:01 PM    INDICATION: Headache, intracranial hemorrhage suspected.  COMPARISON: Head CT 09/21/2018.  TECHNIQUE: Routine CT Head without IV contrast. Multiplanar reformats. Dose reduction techniques were used.    FINDINGS:  No CT evidence of acute ischemia or hemorrhage. Old lacunar infarct involving the right basal ganglia/centrum semiovale. Mild parenchymal volume loss. Mild vascular calcifications. Presumed incidental basal ganglia  mineralization. White matter   hypoattenuation likely represents chronic small vessel ischemic change.    Multiple presumed epidermal inclusion/sebaceous cysts. The visualized calvarium, tympanic cavities, and mastoid cavities are unremarkable.      Impression    IMPRESSION:  1.  No CT evidence of acute ischemia or hemorrhage.  2.  Old infarct involving the right basal ganglia/centrum semiovale.     MR Brain w/o & w Contrast     Status: None    Narrative    EXAM: MR BRAIN W/O and W CONTRAST  LOCATION: [facility]  DATE/TIME: 12/4/2021 12:26 PM    INDICATION: Stroke, followup. Neuro deficit, acute, stroke suspected, headache and tingling  COMPARISON: MRI of the head 09/21/2018.  CONTRAST: 7 mL Gadavist  TECHNIQUE: Routine multiplanar multisequence head MRI without and with intravenous contrast.    FINDINGS: Old lacunar infarct involving the right basal ganglia/centrum semiovale. No convincing evidence of acute ischemia or hemorrhage. Mild volume loss. Scattered frontoparietal prominent white matter T2 hyperintensities which likely represent   chronic small vessel ischemic change. No abnormal enhancement. Major intracranial flow voids are maintained.    Marrow signal is within normal limits. The visualized paranasal sinuses, tympanic cavities and mastoid cavities are unremarkable. Multiple presumed epidermal inclusion or sebaceous cysts within the scalp subcutaneous soft tissues, not significantly   changed.      Impression    IMPRESSION:  1.  No convincing evidence of acute ischemia.  2.  Old lacunar infarct involving the right basal ganglia/centrum semiovale.  3.  Volume loss and white matter T2 hyperintensities which likely represent chronic small vessel ischemic change.   Basic metabolic panel     Status: Abnormal   Result Value Ref Range    Sodium 143 133 - 144 mmol/L    Potassium 4.0 3.4 - 5.3 mmol/L    Chloride 111 (H) 94 - 109 mmol/L    Carbon Dioxide (CO2) 27 20 - 32 mmol/L    Anion  Gap 5 3 - 14 mmol/L    Urea Nitrogen 11 7 - 30 mg/dL    Creatinine 0.80 0.52 - 1.04 mg/dL    Calcium 9.0 8.5 - 10.1 mg/dL    Glucose 99 70 - 99 mg/dL    GFR Estimate 73 >60 mL/min/1.73m2   TSH with free T4 reflex     Status: Abnormal   Result Value Ref Range    TSH 4.59 (H) 0.40 - 4.00 mU/L   Ionized Calcium     Status: Normal   Result Value Ref Range    Calcium Ionized 4.9 4.4 - 5.2 mg/dL   CBC with platelets and differential     Status: None   Result Value Ref Range    WBC Count 5.0 4.0 - 11.0 10e3/uL    RBC Count 4.45 3.80 - 5.20 10e6/uL    Hemoglobin 13.0 11.7 - 15.7 g/dL    Hematocrit 39.4 35.0 - 47.0 %    MCV 89 78 - 100 fL    MCH 29.2 26.5 - 33.0 pg    MCHC 33.0 31.5 - 36.5 g/dL    RDW 12.5 10.0 - 15.0 %    Platelet Count 254 150 - 450 10e3/uL    % Neutrophils 62 %    % Lymphocytes 27 %    % Monocytes 7 %    % Eosinophils 3 %    % Basophils 1 %    % Immature Granulocytes 0 %    NRBCs per 100 WBC 0 <1 /100    Absolute Neutrophils 3.1 1.6 - 8.3 10e3/uL    Absolute Lymphocytes 1.3 0.8 - 5.3 10e3/uL    Absolute Monocytes 0.4 0.0 - 1.3 10e3/uL    Absolute Eosinophils 0.2 0.0 - 0.7 10e3/uL    Absolute Basophils 0.1 0.0 - 0.2 10e3/uL    Absolute Immature Granulocytes 0.0 <=0.4 10e3/uL    Absolute NRBCs 0.0 10e3/uL   Extra Blue Top Tube     Status: None   Result Value Ref Range    Hold Specimen JIC    Extra Red Top Tube     Status: None   Result Value Ref Range    Hold Specimen JIC    T4 free     Status: Normal   Result Value Ref Range    Free T4 0.87 0.76 - 1.46 ng/dL   CBC with platelets differential     Status: None    Narrative    The following orders were created for panel order CBC with platelets differential.  Procedure                               Abnormality         Status                     ---------                               -----------         ------                     CBC with platelets and d...[[id number]]                      Final result                 Please view results for these tests on the  individual orders.   [place] Draw     Status: None    Narrative    The following orders were created for panel order [place] Draw.  Procedure                               Abnormality         Status                     ---------                               -----------         ------                     Extra Blue Top Tube[[id number]]                              Final result               Extra Red Top Tube[[id number]]                               Final result                 Please view results for these tests on the individual orders.           Assessments & Plan (with Medical Decision Making)   Assessment Summary and Clinical Impression: 71-year-old female who presented with generalized tingling that began 3 days earlier that was self-limiting and traveling and report of headache last night that resolved.  She arrived asymptomatic but had a brief episode of tingling of in her left lower leg without associated weakness.  The cause of her symptoms as reported and described is unclear.  Patient has a prior diagnosis of TIA and lacunar infarct and a history of hypertension.  She has previously been treated for ductal carcinoma in situ of the right breast.  On examination she reported she been compliant with her lisinopril.  Spouse was at the bedside reported no new changes including no speech difficulty, extremity weakness or visual disturbance or changes.  She had no focal deficits GCS was 15.  She arrived hypertensive intake blood pressure was 189/98 (improved during ED course without intervention) though not at goal with report of compliance with antihypertensive. Patient is afebrile.  Neuro imaging was obtained given her history and commorbidities.  Patient had no active symptoms or deficits and reported symptoms began 24 hours earlier hence no code stroke activation.  Neuroimaging showed no acute findings and she had no change in her exam serially during her ED course.  She expressed comfort going home with  outpatient follow-up with low threshold to return for reevaluation.    ED course and Plan;  Reviewed the medical record.  Reviewed neuroimaging and ED evaluation  from September 21, 2018 when patient was evaluated for possible lacunar infarct.  MRI brain and CT head and CTA of the head and neck.  See additional details in the medical record.  With tingling described without numbness or weakness she was monitored with frequent and serial neurologic examination.  Blood work was obtained and neuroimaging is obtained.   Work up revealed normal ionized calcium.  Normal hemogram.  TSH was mildly elevated at 4.59 T4 was normal.  Patient's electrolytes were within normal limits.  MRI brain revealed no acute findings, and a known old lacunar infarct in the right basal ganglia. See additional details in the medical record.   After period of care patient had no change in her neurologic examination and we discussed that the cause of her episodes of tingling that appeared to be traveling is not clear.  Patient and spouse were reassured by her evaluation.  We discussed and reviewed worrisome symptoms including reasons to return to the department to evaluate.  I recommended a recheck in clinic to discuss adjustment of her current antihypertensive dose due to her elevated blood pressure readings during her ED course.  We also discussed her mildly elevated TSH with low concern that she has thyroid disease.  Patient and spouse was present during the entire ED course expressed comfort, understanding and agreement with the plan of care.      Disclaimer: This note consists of symbols derived from keyboarding, dictation and/or voice recognition software. As a result, there may be errors in the script that have gone undetected. Please consider this when interpreting information found in this chart.  I have reviewed the nursing notes.    I have reviewed the findings, diagnosis, plan and need for follow up with the patient.       New  Prescriptions    No medications on file       Final diagnoses:   Tingling - Intermittently over the last 3 days.   History of hypertension       12/4/2021   [facility] EMERGENCY DEPT     Papito Davis MD  12/04/21 [id number]

## 2021-12-04 NOTE — ED NOTES
"Please see triage note.  Pt states that tingling \"is all over and comes and goes. It feels like when your foot falls asleep\". Pt states that she took tylenol for a headache last night with relief.   Pt take BP meds daily, last followed up with primary MD in June about this.   Pt denies any vision changes, has appointment Monday for annual eye exam.   "

## 2022-03-30 NOTE — ED NOTES
Pt vomit very small amount of bile.  MD in room.  Pt does not want any thing more for the n/v.   Normal for race

## 2022-05-23 DIAGNOSIS — I10 HYPERTENSION GOAL BP (BLOOD PRESSURE) < 140/90: ICD-10-CM

## 2022-05-23 RX ORDER — LISINOPRIL 20 MG/1
20 TABLET ORAL DAILY
Qty: 90 TABLET | Refills: 3 | Status: SHIPPED | OUTPATIENT
Start: 2022-05-23 | End: 2023-05-18

## 2022-05-23 NOTE — TELEPHONE ENCOUNTER
Routing refill request to provider for review/approval because:  Drug not on the [facility] refill protocol   BP Readings from Last 3 Encounters:   12/04/21 (!) 159/85   06/01/21 137/83   04/28/21 (!) 147/90

## 2022-06-23 ENCOUNTER — OFFICE VISIT (OUTPATIENT)
Dept: FAMILY MEDICINE | Facility: CLINIC | Age: 72
End: 2022-06-23
Payer: COMMERCIAL

## 2022-06-23 VITALS
OXYGEN SATURATION: 96 % | WEIGHT: 155 LBS | TEMPERATURE: 99 F | HEART RATE: 82 BPM | BODY MASS INDEX: 26.46 KG/M2 | HEIGHT: 64 IN | DIASTOLIC BLOOD PRESSURE: 82 MMHG | SYSTOLIC BLOOD PRESSURE: 138 MMHG

## 2022-06-23 DIAGNOSIS — C50.911 MALIGNANT NEOPLASM OF RIGHT BREAST IN FEMALE, ESTROGEN RECEPTOR NEGATIVE, UNSPECIFIED SITE OF BREAST (H): ICD-10-CM

## 2022-06-23 DIAGNOSIS — I63.81 LACUNAR INFARCTION (H): ICD-10-CM

## 2022-06-23 DIAGNOSIS — Z12.11 SCREEN FOR COLON CANCER: ICD-10-CM

## 2022-06-23 DIAGNOSIS — I10 HYPERTENSION GOAL BP (BLOOD PRESSURE) < 140/90: Primary | ICD-10-CM

## 2022-06-23 DIAGNOSIS — R94.6 ABNORMAL FINDING ON THYROID FUNCTION TEST: ICD-10-CM

## 2022-06-23 DIAGNOSIS — Z17.1 MALIGNANT NEOPLASM OF RIGHT BREAST IN FEMALE, ESTROGEN RECEPTOR NEGATIVE, UNSPECIFIED SITE OF BREAST (H): ICD-10-CM

## 2022-06-23 PROBLEM — Z11.59 NEED FOR HEPATITIS C SCREENING TEST: Status: ACTIVE | Noted: 2022-06-23

## 2022-06-23 LAB
ANION GAP SERPL CALCULATED.3IONS-SCNC: 8 MMOL/L (ref 3–14)
BUN SERPL-MCNC: 10 MG/DL (ref 7–30)
CALCIUM SERPL-MCNC: 9.5 MG/DL (ref 8.5–10.1)
CHLORIDE BLD-SCNC: 108 MMOL/L (ref 94–109)
CO2 SERPL-SCNC: 25 MMOL/L (ref 20–32)
CREAT SERPL-MCNC: 0.99 MG/DL (ref 0.52–1.04)
GFR SERPL CREATININE-BSD FRML MDRD: 60 ML/MIN/1.73M2
GLUCOSE BLD-MCNC: 98 MG/DL (ref 70–99)
POTASSIUM BLD-SCNC: 3.6 MMOL/L (ref 3.4–5.3)
SODIUM SERPL-SCNC: 141 MMOL/L (ref 133–144)
TSH SERPL DL<=0.005 MIU/L-ACNC: 2.62 MU/L (ref 0.4–4)

## 2022-06-23 PROCEDURE — 84443 ASSAY THYROID STIM HORMONE: CPT | Performed by: FAMILY MEDICINE

## 2022-06-23 PROCEDURE — 36415 COLL VENOUS BLD VENIPUNCTURE: CPT | Performed by: FAMILY MEDICINE

## 2022-06-23 PROCEDURE — 99214 OFFICE O/P EST MOD 30 MIN: CPT | Performed by: FAMILY MEDICINE

## 2022-06-23 PROCEDURE — 80048 BASIC METABOLIC PNL TOTAL CA: CPT | Performed by: FAMILY MEDICINE

## 2022-06-23 ASSESSMENT — PAIN SCALES - GENERAL: PAINLEVEL: MODERATE PAIN (4)

## 2022-06-23 NOTE — PATIENT INSTRUCTIONS
Mammogram in September.       Blood tests today.       We'll send you a kit in the mail for colon cancer screening: Lana.

## 2022-06-23 NOTE — PROGRESS NOTES
Assessment & Plan        (I10) Hypertension goal BP (blood pressure) < 140/90  (primary encounter diagnosis)  Comment: Second readings within guidelines.  Plan: Basic metabolic panel  (Ca, Cl, CO2, Creat,         Gluc, K, Na, BUN)        Continue on lisinopril, will check kidney function.  Follow-up in 3 to 6 months.    (Z12.11) Screen for colon cancer  Plan: BUBBA([employer])      (C50.911,  Z17.1) Malignant neoplasm of right breast in female, estrogen receptor negative, unspecified site of breast (H)  Comment: Followed by oncology, no evidence of recurrence.  Plan: Patient is to September for follow-up mammogram and oncology visit.    (I63.81) Lacunar infarction (H)  Comment: No residual symptoms, but advised secondary prevention including aspirin, management of high blood pressure, and encouraged statin therapy.  Statin therapy deferred per patient.  Plan: Monitor.    (R94.6) Abnormal finding on thyroid function test  Comment: Slightly elevated TSH with a normal free T4 from ER visit January 2022.  Clinically euthyroid.  Plan: TSH with free T4 reflex        Await test results.    956}  Results for orders placed or performed in visit on 06/23/22   Basic metabolic panel  (Ca, Cl, CO2, Creat, Gluc, K, Na, BUN)     Status: Abnormal   Result Value Ref Range    Sodium 141 133 - 144 mmol/L    Potassium 3.6 3.4 - 5.3 mmol/L    Chloride 108 94 - 109 mmol/L    Carbon Dioxide (CO2) 25 20 - 32 mmol/L    Anion Gap 8 3 - 14 mmol/L    Urea Nitrogen 10 7 - 30 mg/dL    Creatinine 0.99 0.52 - 1.04 mg/dL    Calcium 9.5 8.5 - 10.1 mg/dL    Glucose 98 70 - 99 mg/dL    GFR Estimate 60 (L) >60 mL/min/1.73m2   TSH with free T4 reflex     Status: Normal   Result Value Ref Range    TSH 2.62 0.40 - 4.00 mU/L        Patient Instructions       Mammogram in September.         Blood tests today.         We'll send you a kit in the mail for colon cancer screening: Cologuard.        BMI:   Estimated body mass index is 26.61 kg/m  as  "calculated from the following:    Height as of this encounter: 5' 4\" (1.626 m).    Weight as of this encounter: 155 lb (70.3 kg).         No follow-ups on file.    Nhung Navarro MD  [facility] [place] At[place] is a 71 year old, presenting for the following health issues:  Hypertension      History of Present Illness       Hypertension: She presents for follow up of hypertension.  She does not check blood pressure  regularly outside of the clinic. Outside blood pressures have been over 140/90. She does not follow a low salt diet.         Hypertension Follow-up      Do you check your blood pressure regularly outside of the clinic? No     Are you following a low salt diet? No    Are your blood pressures ever more than 140 on the top number (systolic) OR more   than 90 on the bottom number (diastolic), for example 140/90? Yes          Review of Systems   CONSTITUTIONAL: NEGATIVE for fever, chills, change in weight  ENT/MOUTH: NEGATIVE for ear, mouth and throat problems  RESP: NEGATIVE for significant cough or SOB  CV: NEGATIVE for chest pain, palpitations or peripheral edema  NEURO: NEGATIVE for weakness, dizziness or paresthesias      Objective    BP (!) 163/92 (BP Location: Left arm, Cuff Size: Adult Large)   Pulse 82   Temp 99  F (37.2  C) (Tympanic)   Ht 5' 4\" (1.626 m)   Wt 155 lb (70.3 kg)   LMP  (LMP Unknown)   SpO2 96%   BMI 26.61 kg/m    Body mass index is 26.61 kg/m .  Physical Exam   GENERAL: Healthy, alert and no distress  EYES: Eyes grossly normal to inspection, conjunctivae and sclerae normal  RESP: Lungs clear to auscultation - no rales, rhonchi or wheezes  CV: Regular rate and rhythm, normal S1 S2, no murmur  MS: No gross musculoskeletal defects noted, no edema  NEURO: Normal strength and tone, mentation intact and speech normal  PSYCH: Mentation appears normal, affect normal/bright     Nhung Navarro MD         .  ..  "

## 2022-06-27 ENCOUNTER — ONCOLOGY VISIT (OUTPATIENT)
Dept: ONCOLOGY | Facility: CLINIC | Age: 72
End: 2022-06-27
Attending: INTERNAL MEDICINE
Payer: COMMERCIAL

## 2022-06-27 VITALS
DIASTOLIC BLOOD PRESSURE: 77 MMHG | HEIGHT: 64 IN | SYSTOLIC BLOOD PRESSURE: 170 MMHG | BODY MASS INDEX: 26.46 KG/M2 | TEMPERATURE: 99 F | RESPIRATION RATE: 12 BRPM | HEART RATE: 88 BPM | OXYGEN SATURATION: 96 % | WEIGHT: 155 LBS

## 2022-06-27 DIAGNOSIS — C50.911 MALIGNANT NEOPLASM OF RIGHT BREAST IN FEMALE, ESTROGEN RECEPTOR NEGATIVE, UNSPECIFIED SITE OF BREAST (H): Primary | ICD-10-CM

## 2022-06-27 DIAGNOSIS — Z17.1 MALIGNANT NEOPLASM OF RIGHT BREAST IN FEMALE, ESTROGEN RECEPTOR NEGATIVE, UNSPECIFIED SITE OF BREAST (H): Primary | ICD-10-CM

## 2022-06-27 DIAGNOSIS — Z12.31 ENCOUNTER FOR SCREENING MAMMOGRAM FOR BREAST CANCER: ICD-10-CM

## 2022-06-27 PROCEDURE — G0463 HOSPITAL OUTPT CLINIC VISIT: HCPCS

## 2022-06-27 PROCEDURE — 99214 OFFICE O/P EST MOD 30 MIN: CPT | Performed by: INTERNAL MEDICINE

## 2022-06-27 ASSESSMENT — PAIN SCALES - GENERAL: PAINLEVEL: NO PAIN (0)

## 2022-06-27 NOTE — PROGRESS NOTES
"Oncology Rooming Note    June 27, 2022 3:29 PM   Milly Loaiza is a 71 year old female who presents for:    Chief Complaint   Patient presents with     Oncology Clinic Visit     Invasive ductal carcinoma of breast, right - Provider visit only     Initial Vitals: BP (!) 160/83 (BP Location: Left arm, Patient Position: Sitting, Cuff Size: Adult Regular)   Pulse 88   Temp 99  F (37.2  C) (Oral)   Resp 12   Ht 1.626 m (5' 4\")   Wt 70.3 kg (155 lb)   LMP  (LMP Unknown)   SpO2 96%   BMI 26.61 kg/m   Estimated body mass index is 26.61 kg/m  as calculated from the following:    Height as of this encounter: 1.626 m (5' 4\").    Weight as of this encounter: 70.3 kg (155 lb). Body surface area is 1.78 meters squared.  No Pain (0) Comment: Data Unavailable   No LMP recorded (lmp unknown). Patient is postmenopausal.  Allergies reviewed: Yes  Medications reviewed: Yes    Medications: Medication refills not needed today.  Pharmacy name entered into [facility]:    [facility] [place] [facility] [address] [facility] [place] [id number] [facility]    Clinical concerns:  None      Nunu Robins CMA            "

## 2022-06-27 NOTE — PROGRESS NOTES
[facility] Hematology and Oncology Progress Note    Patient: Milly Loaiza  MRN: [id number]  Date of Service: 06/27/2022        Reason for Visit    Chief Complaint   Patient presents with     Oncology Clinic Visit     Invasive ductal carcinoma of breast, right - Provider visit only         Problem List Items Addressed This Visit        Other    Malignant neoplasm of female breast (H) - Primary    Relevant Orders    MA Screen Bilateral w/Francisco      Other Visit Diagnoses     Encounter for screening mammogram for breast cancer        Relevant Orders    MA Screen Bilateral w/Francisco            Assessment and Plan  Recurrent triple negative breast cancer status postchemotherapy in 2004  Has had recurrent triple negative breast cancer twice now.  Latest in 2020.  Underwent surgery and radiation.  Declined chemotherapy as she did not do well with initial chemotherapy back in 2004.  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  All of her cancers have been on the right side.  Negative for BRCA.  Has opted for observation only.  Clinically doing well today.  No signs of disease progression.  She has upcoming angiogram in September.  Physical exam today was unremarkable.  She has prior lumpectomy scars with some fibrous tissue underneath.  No evidence of any new lumps in the right breast.  Left breast is normal.    Continue observation with periodic physical exam and yearly mammograms.  She wants to do 1 year follow-ups.    Patient Instructions   Please schedule a maammo in September. RTC in 1 year.        Cancer Staging  Invasive ductal carcinoma of breast, right (H)  Staging form: Breast, AJCC 8th Edition  - Pathologic: Stage IIA (pT2, pN0, cM0, G3, ER-, WA-, HER2-) - Signed by Faisal Urena MD on 7/21/2022    Malignant neoplasm of female breast (H)  Staging form: Breast, AJCC 8th Edition  - Pathologic: Stage Unknown (pT1b, pNX, cM0, G3, ER-, WA-, HER2-) - Signed by Faisal Urena MD on  7/21/2022      ECOG Performance    1 - Can't do physically strenuous work, but fully ambyulatory and can do light sedentary work         Problem List    Patient Active Problem List   Diagnosis     Malignant neoplasm of female breast (H)     DCIS (ductal carcinoma in situ)     CARDIOVASCULAR SCREENING; LDL GOAL LESS THAN 160     TIA (transient ischemic attack)     Advanced directives, counseling/discussion     Lacunar infarction (H)     Hypertension goal BP (blood pressure) < 140/90     Invasive ductal carcinoma of breast, right (H)     Need for hepatitis C screening test        Oncology history  She was initially diagnosed at end of 2003 with screening mammogram right breast cancer. She was then treated with right breast lumpectomy in 02/2004 T2N0M0 poorly differentiated infiltrating ductal cancer, ER negative, HER-2 negative( triple negative)    She had a second breast cancer on the right side diagnosed screening mammogram 12/2008. Biopsy 01/2009 at [facility] indicating high-grade DCIS with necrosis, not enough tissue for ER/OR studies. Pathology from lumpectomy 03/2009 no residual invasive cancer.      She has abnormal screening mammogram September 2019 which found right breast 0.9 cm focal asymmetry 2-3 o'clock in the right breast 2.7 cm from the nipple.    US biopsy found IDC grade III, ER/OR-, Her 2 FISH equivocal, IHC 1+, so combined Her2 final reading was negative.     Due to the small size of the lesion, her prior poor tolerance to chemotherapy, upfront surgical lumpectomy is offered 1/2020 found IDC 1 cm, grade 2, no LVI, pT1bNx. No LN removed due to prior LND surgery    Treatment:  2/2004 -Right breast lumpectomy   3/2004 - began ACx 3 cycles and could not tolerate anymore chemotherapy, followed by radiation.    3/2009 - right lumpectomy without residual invasive cancer  1/2020- Due to the small size of the lesion, her prior poor tolerance to chemotherapy, upfront surgical lumpectomy was completed then  Right lumpectomy finished right postlumpectomy radiation April 20    Interval History   Milly Loaiza is a 71 year old female with history of recurrent right-sided breast cancer status post surgery and chemotherapy in the past was currently on observation who is seen in oncology clinic for follow-up.    She has extensive breast cancer history.  Initially had a triple negative right-sided breast cancer in 2004.  Received adjuvant AC for 3 cycles and stopped after that due to intolerance.  Received radiation.  Had a recurrent high-grade DCIS in 2009 and underwent lumpectomy.  Again in 2020 she had a T1b triple negative high-grade ductal carcinoma and underwent surgical resection.  Also received postlumpectomy radiation again.  Declined chemotherapy and is currently on observation.  Denies any new issues since last visit.  No new lumps or bumps.  No bone pain.  No weight loss.        Review of Systems  A comprehensive review of systems was negative except for what is noted in the interval history    Current Outpatient Medications   Medication     acetaminophen (TYLENOL) 325 MG tablet     aspirin 325 MG tablet     calcium carbonate (OS-LUCIAN 500 MG [place]. [place]) 500 MG tablet     DAILY MULTI VITAMIN/MINERALS OR     lisinopril (ZESTRIL) 20 MG tablet     No current facility-administered medications for this visit.        Physical Exam    No flowsheet data found.    General: alert and cooperative  HEENT: Head: Normal, normocephalic, atraumatic.  Eye: Normal external eye, conjunctiva, lids cornea, GILLES.  Chest: Clear to auscultation bilaterally  Cardiac: S1, S2 normal, regular rate and rhythm  Abdomen: abdomen is soft without significant tenderness, masses, organomegaly or guarding  Extremities: atraumatic, no peripheral edema  Skin: no rashes  Breast: Multiple right breast lumpectomy scars noted.  Dense fibrous tissue underneath.  No new lumps.  No skin changes.  Left breast is normal.  No bilateral axillary adenopathy  noted.  CNS: Alert and oriented x3, neurologic exam grossly normal.  Lymphatics: No bilateral cervical, axillary, supraclavicular or inguinal adenopathy noted    Lab Results    No results found for this or any previous visit (from the past 168 hour(s)).    Imaging    No results found.    A total of 30 min were spent today on this visit which included face to face conversation with the patient, EMR review, counseling and co-ordination of care and medical documentation.      Signed by: Faisal Urena MD

## 2022-06-27 NOTE — LETTER
6/27/2022         RE: Milly Loaiza  [address] [facility] [address]        Dear Colleague,    Thank you for referring your patient, Milly Loaiza, to the [facility]. Please see a copy of my visit note below.    [facility] Hematology and Oncology Progress Note    Patient: Milly Loaiza  MRN: [id number]  Date of Service: 06/27/2022        Reason for Visit    Chief Complaint   Patient presents with     Oncology Clinic Visit     Invasive ductal carcinoma of breast, right - Provider visit only         Problem List Items Addressed This Visit        Other    Malignant neoplasm of female breast (H) - Primary    Relevant Orders    MA Screen Bilateral w/Francisco      Other Visit Diagnoses     Encounter for screening mammogram for breast cancer        Relevant Orders    MA Screen Bilateral w/Francisco            Assessment and Plan  Recurrent triple negative breast cancer status postchemotherapy in 2004  Has had recurrent triple negative breast cancer twice now.  Latest in 2020.  Underwent surgery and radiation.  Declined chemotherapy as she did not do well with initial chemotherapy back in 2004.  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  All of her cancers have been on the right side.  Negative for BRCA.  Has opted for observation only.  Clinically doing well today.  No signs of disease progression.  She has upcoming angiogram in September.  Physical exam today was unremarkable.  She has prior lumpectomy scars with some fibrous tissue underneath.  No evidence of any new lumps in the right breast.  Left breast is normal.    Continue observation with periodic physical exam and yearly mammograms.  She wants to do 1 year follow-ups.    Patient Instructions   Please schedule a maammo in September. RTC in 1 year.        Cancer Staging  Invasive ductal carcinoma of breast, right (H)  Staging form: Breast, AJCC 8th Edition  - Pathologic: Stage IIA (pT2, pN0, cM0,  G3, ER-, FL-, HER2-) - Signed by Faisal Urena MD on 7/21/2022    Malignant neoplasm of female breast (H)  Staging form: Breast, AJCC 8th Edition  - Pathologic: Stage Unknown (pT1b, pNX, cM0, G3, ER-, FL-, HER2-) - Signed by Faisal Urena MD on 7/21/2022      ECOG Performance    1 - Can't do physically strenuous work, but fully ambyulatory and can do light sedentary work         Problem List    Patient Active Problem List   Diagnosis     Malignant neoplasm of female breast (H)     DCIS (ductal carcinoma in situ)     CARDIOVASCULAR SCREENING; LDL GOAL LESS THAN 160     TIA (transient ischemic attack)     Advanced directives, counseling/discussion     Lacunar infarction (H)     Hypertension goal BP (blood pressure) < 140/90     Invasive ductal carcinoma of breast, right (H)     Need for hepatitis C screening test        Oncology history  She was initially diagnosed at end of 2003 with screening mammogram right breast cancer. She was then treated with right breast lumpectomy in 02/2004 T2N0M0 poorly differentiated infiltrating ductal cancer, ER negative, HER-2 negative( triple negative)    She had a second breast cancer on the right side diagnosed screening mammogram 12/2008. Biopsy 01/2009 at [facility] indicating high-grade DCIS with necrosis, not enough tissue for ER/FL studies. Pathology from lumpectomy 03/2009 no residual invasive cancer.      She has abnormal screening mammogram September 2019 which found right breast 0.9 cm focal asymmetry 2-3 o'clock in the right breast 2.7 cm from the nipple.    US biopsy found IDC grade III, ER/FL-, Her 2 FISH equivocal, IHC 1+, so combined Her2 final reading was negative.     Due to the small size of the lesion, her prior poor tolerance to chemotherapy, upfront surgical lumpectomy is offered 1/2020 found IDC 1 cm, grade 2, no LVI, pT1bNx. No LN removed due to prior LND surgery    Treatment:  2/2004 -Right breast lumpectomy   3/2004 - began ACx 3 cycles and could  not tolerate anymore chemotherapy, followed by radiation.    3/2009 - right lumpectomy without residual invasive cancer  1/2020- Due to the small size of the lesion, her prior poor tolerance to chemotherapy, upfront surgical lumpectomy was completed then Right lumpectomy finished right postlumpectomy radiation April 20    Interval History   Milly Loaiza is a 71 year old female with history of recurrent right-sided breast cancer status post surgery and chemotherapy in the past was currently on observation who is seen in oncology clinic for follow-up.    She has extensive breast cancer history.  Initially had a triple negative right-sided breast cancer in 2004.  Received adjuvant AC for 3 cycles and stopped after that due to intolerance.  Received radiation.  Had a recurrent high-grade DCIS in 2009 and underwent lumpectomy.  Again in 2020 she had a T1b triple negative high-grade ductal carcinoma and underwent surgical resection.  Also received postlumpectomy radiation again.  Declined chemotherapy and is currently on observation.  Denies any new issues since last visit.  No new lumps or bumps.  No bone pain.  No weight loss.        Review of Systems  A comprehensive review of systems was negative except for what is noted in the interval history    Current Outpatient Medications   Medication     acetaminophen (TYLENOL) 325 MG tablet     aspirin 325 MG tablet     calcium carbonate (OS-LUCIAN 500 MG [place]. [place]) 500 MG tablet     DAILY MULTI VITAMIN/MINERALS OR     lisinopril (ZESTRIL) 20 MG tablet     No current facility-administered medications for this visit.        Physical Exam    No flowsheet data found.    General: alert and cooperative  HEENT: Head: Normal, normocephalic, atraumatic.  Eye: Normal external eye, conjunctiva, lids cornea, GILLES.  Chest: Clear to auscultation bilaterally  Cardiac: S1, S2 normal, regular rate and rhythm  Abdomen: abdomen is soft without significant tenderness, masses, organomegaly or  "guarding  Extremities: atraumatic, no peripheral edema  Skin: no rashes  Breast: Multiple right breast lumpectomy scars noted.  Dense fibrous tissue underneath.  No new lumps.  No skin changes.  Left breast is normal.  No bilateral axillary adenopathy noted.  CNS: Alert and oriented x3, neurologic exam grossly normal.  Lymphatics: No bilateral cervical, axillary, supraclavicular or inguinal adenopathy noted    Lab Results    No results found for this or any previous visit (from the past 168 hour(s)).    Imaging    No results found.    A total of 30 min were spent today on this visit which included face to face conversation with the patient, EMR review, counseling and co-ordination of care and medical documentation.      Signed by: Faisal Urena MD      Oncology Rooming Note    June 27, 2022 3:29 PM   Milly Loaiza is a 71 year old female who presents for:    Chief Complaint   Patient presents with     Oncology Clinic Visit     Invasive ductal carcinoma of breast, right - Provider visit only     Initial Vitals: BP (!) 160/83 (BP Location: Left arm, Patient Position: Sitting, Cuff Size: Adult Regular)   Pulse 88   Temp 99  F (37.2  C) (Oral)   Resp 12   Ht 1.626 m (5' 4\")   Wt 70.3 kg (155 lb)   LMP  (LMP Unknown)   SpO2 96%   BMI 26.61 kg/m   Estimated body mass index is 26.61 kg/m  as calculated from the following:    Height as of this encounter: 1.626 m (5' 4\").    Weight as of this encounter: 70.3 kg (155 lb). Body surface area is 1.78 meters squared.  No Pain (0) Comment: Data Unavailable   No LMP recorded (lmp unknown). Patient is postmenopausal.  Allergies reviewed: Yes  Medications reviewed: Yes    Medications: Medication refills not needed today.  Pharmacy name entered into [employer]:    [facility] [place] - [address] [facility] [place] [address] [facility]    Clinical concerns:  None      [place], ESDRAS                Again, " thank you for allowing me to participate in the care of your patient.        Sincerely,        Faisal Urena MD

## 2022-09-28 ENCOUNTER — HOSPITAL ENCOUNTER (OUTPATIENT)
Dept: MAMMOGRAPHY | Facility: CLINIC | Age: 72
Discharge: HOME | End: 2022-09-28
Attending: INTERNAL MEDICINE | Admitting: INTERNAL MEDICINE
Payer: COMMERCIAL

## 2022-09-28 DIAGNOSIS — Z12.31 ENCOUNTER FOR SCREENING MAMMOGRAM FOR BREAST CANCER: ICD-10-CM

## 2022-09-28 DIAGNOSIS — C50.911 MALIGNANT NEOPLASM OF RIGHT BREAST IN FEMALE, ESTROGEN RECEPTOR NEGATIVE, UNSPECIFIED SITE OF BREAST (H): ICD-10-CM

## 2022-09-28 DIAGNOSIS — Z17.1 MALIGNANT NEOPLASM OF RIGHT BREAST IN FEMALE, ESTROGEN RECEPTOR NEGATIVE, UNSPECIFIED SITE OF BREAST (H): ICD-10-CM

## 2022-09-28 PROCEDURE — 77067 SCR MAMMO BI INCL CAD: CPT

## 2022-10-21 ENCOUNTER — HOSPITAL ENCOUNTER (EMERGENCY)
Facility: CLINIC | Age: 72
Discharge: HOME | End: 2022-10-21
Attending: EMERGENCY MEDICINE | Admitting: EMERGENCY MEDICINE
Payer: COMMERCIAL

## 2022-10-21 VITALS
HEIGHT: 64 IN | HEART RATE: 92 BPM | WEIGHT: 155 LBS | SYSTOLIC BLOOD PRESSURE: 186 MMHG | DIASTOLIC BLOOD PRESSURE: 108 MMHG | TEMPERATURE: 97.9 F | RESPIRATION RATE: 18 BRPM | OXYGEN SATURATION: 98 % | BODY MASS INDEX: 26.46 KG/M2

## 2022-10-21 DIAGNOSIS — M25.532 LEFT WRIST PAIN: ICD-10-CM

## 2022-10-21 PROCEDURE — 99282 EMERGENCY DEPT VISIT SF MDM: CPT | Performed by: EMERGENCY MEDICINE

## 2022-10-21 PROCEDURE — 99282 EMERGENCY DEPT VISIT SF MDM: CPT

## 2022-10-21 ASSESSMENT — ENCOUNTER SYMPTOMS
FEVER: 0
SHORTNESS OF BREATH: 0
ABDOMINAL PAIN: 0

## 2022-10-21 ASSESSMENT — ACTIVITIES OF DAILY LIVING (ADL): ADLS_ACUITY_SCORE: 35

## 2022-10-21 NOTE — ED TRIAGE NOTES
Patient having pain in left wrist that started yesterday. Pt reports having shooting pain from wrist up left arm. No known injury. Pt took 500 mg tylenol at about 2300.    Triage Assessment     Row Name 10/21/22 0233       Triage Assessment (Adult)    Airway WDL WDL       Respiratory WDL    Respiratory WDL WDL       Skin Circulation/Temperature WDL    Skin Circulation/Temperature WDL WDL       Cardiac WDL    Cardiac WDL WDL       Peripheral/Neurovascular WDL    Peripheral Neurovascular WDL WDL       Cognitive/Neuro/Behavioral WDL    Cognitive/Neuro/Behavioral WDL WDL

## 2022-10-21 NOTE — ED PROVIDER NOTES
History     Chief Complaint   Patient presents with     Wrist Pain     Patient having pain in left wrist that started yesterday. Pt reports having shooting pain from wrist up left arm. No known injury. Pt took 500 mg tylenol at about 2300.      HPI  Milly Loaiza is a 72 year old female who is right-hand dominant, presenting the emergency department with approximately 2-week history of left-sided wrist pain.  Patient has had sharp, stabbing wrist pains over the ulnar aspect, with radiation just this evening up towards the forearm, and medial left bicep area.  There is no redness.  No trauma, fall, or other injury.  No redness, rash, or swelling has been noted.  Patient does work outside, with horses frequently.  However, no known traumatic episode.    Allergies:  Allergies   Allergen Reactions     Dust Mites Unknown     Coconut Fatty Acids Itching and Rash     Purple blotches     Penicillins Rash     Sulfa Drugs Rash     Purple blotches       Problem List:    Patient Active Problem List    Diagnosis Date Noted     Need for hepatitis C screening test 06/23/2022     Priority: Medium     Invasive ductal carcinoma of breast, right (H) 12/02/2019     Priority: Medium     Added automatically from request for surgery [id number]       Hypertension goal BP (blood pressure) < 140/90 10/09/2019     Priority: Medium     Lacunar infarction (H) 03/31/2019     Priority: Medium     Advanced directives, counseling/discussion 04/05/2017     Priority: Medium     Advance Care Planning 4/5/2017: Information declined             TIA (transient ischemic attack) 03/29/2017     Priority: Medium     CARDIOVASCULAR SCREENING; LDL GOAL LESS THAN 160 10/31/2010     Priority: Medium     DCIS (ductal carcinoma in situ) 03/27/2009     Priority: Medium     Malignant neoplasm of female breast (H) 05/27/2005     Priority: Medium     Problem list name updated by automated process. Provider to review          Past Medical History:    Past Medical  History:   Diagnosis Date     Acute reaction to stress 10/9/2019     Breast cancer (H)      Malignant neoplasm of breast (female), unspecified site        Past Surgical History:    Past Surgical History:   Procedure Laterality Date     BIOPSY BREAST       LUMPECTOMY BREAST Right 1/27/2020    Procedure: Right Wire Localized Breast Lumpectomy(wire placement at 8:30);  Surgeon: Gallo King DO;  Location: [facility] OR     SURGICAL HISTORY OF -   2003    Cervical biopsy     SURGICAL HISTORY OF -   1979    Tubal ligation     SURGICAL HISTORY OF -   2/2/2004    Right mastectomy     SURGICAL HISTORY OF -   3-23-09    Needle-guided left breast biopsy.       Family History:    Family History   Problem Relation Age of Onset     Alcohol/Drug Mother      Respiratory Mother         emphazyma     Cerebrovascular Disease Maternal Grandmother      Osteoporosis Maternal Grandmother      Breast Cancer Paternal Grandmother      Depression Sister      Diabetes Other      Cerebrovascular Disease Other      Cancer Other         lymphademia     Respiratory Other         emphazyma     Thyroid Disease Sister        Social History:  Marital Status:   [2]  Social History     Tobacco Use     Smoking status: Never     Smokeless tobacco: Never   Vaping Use     Vaping Use: Never used   Substance Use Topics     Alcohol use: Yes     Comment: occ.     Drug use: No        Medications:    acetaminophen (TYLENOL) 325 MG tablet  aspirin 325 MG tablet  calcium carbonate (OS-LUCIAN 500 MG [place]. [place]) 500 MG tablet  DAILY MULTI VITAMIN/MINERALS OR  lisinopril (ZESTRIL) 20 MG tablet          Review of Systems   Constitutional: Negative for fever.   Respiratory: Negative for shortness of breath.    Cardiovascular: Negative for chest pain.   Gastrointestinal: Negative for abdominal pain.   Musculoskeletal:        Left wrist pain   All other systems reviewed and are negative.      Physical Exam   BP: (!) 186/108  Pulse: 92  Temp: 97.9  F (36.6  " C)  Resp: 18  Height: 162.6 cm (5' 4\")  Weight: 70.3 kg (155 lb)  SpO2: 98 %      Physical Exam  BP (!) 186/108   Pulse 92   Temp 97.9  F (36.6  C) (Oral)   Resp 18   Ht 1.626 m (5' 4\")   Wt 70.3 kg (155 lb)   LMP  (LMP Unknown)   SpO2 98%   BMI 26.61 kg/m    General: alert and in no acute distress  Head: atraumatic, normocephalic  Abd: nondistended  Musculoskel/Extremities: left wrist with tenderness over the distal ulnar aspect.  No radial tenderness.  Normal capillary refill, and perfusion of the left hand.  Skin: no rashes, no diaphoresis and skin color normal  Neuro: Patient awake, alert, oriented, speech is fluent, gait is normal  Psychiatric: affect/mood normal, cooperative, normal judgement/insight and memory intact      ED Course                 Procedures              Critical Care time:  none               No results found for this or any previous visit (from the past 24 hour(s)).    Medications - No data to display    Assessments & Plan (with Medical Decision Making)  72 year old female, right-hand-dominant, presenting the emergency department with left wrist pain.  Nontraumatic.  No indication for x-ray imaging.  No obvious swelling is noted.  Do not feel that blood clot is likely.  No signs of cellulitis, or lymphangitic spread of any condition.  Patient will be treated as sprain.  Left wrist brace/splint applied.  Activity as tolerated.  Tylenol as needed for pain.  Has had some relief with Tylenol at home of the moderate severity pain.     I have reviewed the nursing notes.    I have reviewed the findings, diagnosis, plan and need for follow up with the patient.       New Prescriptions    No medications on file       Final diagnoses:   Left wrist pain       10/21/2022   [facility] EMERGENCY DEPT     Alfonzo Cesar MD  10/21/22 0313    "

## 2023-04-21 ENCOUNTER — HOSPITAL ENCOUNTER (EMERGENCY)
Facility: CLINIC | Age: 73
End: 2023-04-21
Payer: COMMERCIAL

## 2023-05-18 DIAGNOSIS — I10 HYPERTENSION GOAL BP (BLOOD PRESSURE) < 140/90: ICD-10-CM

## 2023-05-18 NOTE — TELEPHONE ENCOUNTER
Patient calling in for a refill. Wont have enough to get her to her appointment.     Anna Branch RN

## 2023-05-19 RX ORDER — LISINOPRIL 20 MG/1
20 TABLET ORAL DAILY
Qty: 90 TABLET | Refills: 0 | Status: SHIPPED | OUTPATIENT
Start: 2023-05-19 | End: 2023-08-23

## 2023-06-22 ENCOUNTER — OFFICE VISIT (OUTPATIENT)
Dept: FAMILY MEDICINE | Facility: CLINIC | Age: 73
End: 2023-06-22
Payer: COMMERCIAL

## 2023-06-22 VITALS
DIASTOLIC BLOOD PRESSURE: 98 MMHG | RESPIRATION RATE: 16 BRPM | BODY MASS INDEX: 26.15 KG/M2 | SYSTOLIC BLOOD PRESSURE: 158 MMHG | WEIGHT: 153.2 LBS | HEIGHT: 64 IN | OXYGEN SATURATION: 96 % | HEART RATE: 96 BPM | TEMPERATURE: 98 F

## 2023-06-22 DIAGNOSIS — I10 HYPERTENSION GOAL BP (BLOOD PRESSURE) < 140/90: Primary | ICD-10-CM

## 2023-06-22 DIAGNOSIS — C50.911 MALIGNANT NEOPLASM OF RIGHT BREAST IN FEMALE, ESTROGEN RECEPTOR NEGATIVE, UNSPECIFIED SITE OF BREAST (H): ICD-10-CM

## 2023-06-22 DIAGNOSIS — R94.6 ABNORMAL FINDING ON THYROID FUNCTION TEST: ICD-10-CM

## 2023-06-22 DIAGNOSIS — I63.81 LACUNAR INFARCTION (H): ICD-10-CM

## 2023-06-22 DIAGNOSIS — Z17.1 MALIGNANT NEOPLASM OF RIGHT BREAST IN FEMALE, ESTROGEN RECEPTOR NEGATIVE, UNSPECIFIED SITE OF BREAST (H): ICD-10-CM

## 2023-06-22 PROCEDURE — 80053 COMPREHEN METABOLIC PANEL: CPT | Performed by: FAMILY MEDICINE

## 2023-06-22 PROCEDURE — 99214 OFFICE O/P EST MOD 30 MIN: CPT | Performed by: FAMILY MEDICINE

## 2023-06-22 PROCEDURE — 36415 COLL VENOUS BLD VENIPUNCTURE: CPT | Performed by: FAMILY MEDICINE

## 2023-06-22 PROCEDURE — 84443 ASSAY THYROID STIM HORMONE: CPT | Performed by: FAMILY MEDICINE

## 2023-06-22 RX ORDER — AMLODIPINE BESYLATE 5 MG/1
5 TABLET ORAL DAILY
Qty: 90 TABLET | Refills: 1 | Status: SHIPPED | OUTPATIENT
Start: 2023-06-22 | End: 2024-05-07

## 2023-06-22 ASSESSMENT — PAIN SCALES - GENERAL: PAINLEVEL: NO PAIN (0)

## 2023-06-22 NOTE — PATIENT INSTRUCTIONS
Will add another blood pressure medication: amlodipine. You may take this at night also. Keep taking the lisinopril.     Blood tests today.     Yearly check up.

## 2023-06-22 NOTE — PROGRESS NOTES
Assessment & Plan     (I10) Hypertension goal BP (blood pressure) < 140/90  (primary encounter diagnosis)  Comment: Above guidelines using single drug therapy, lisinopril.  Reviewed options, patient typically takes her blood pressure medications at night, will hold on diuretic therapy, add amlodipine.  Normal liver function, adequate renal function.  He is to update me in 1 to 3 months regarding home blood pressure readings.  Plan: amLODIPine (NORVASC) 5 MG tablet, Comprehensive        metabolic panel (BMP + Alb, Alk Phos, ALT, AST,        Total. Bili, TP), CANCELED: Basic metabolic         panel  (Ca, Cl, CO2, Creat, Gluc, K, Na, BUN)       Reviewed side effects.    (I63.81) Lacunar infarction (H)  Comment: No reoccurrence, no obvious residual symptoms.  Plan: Continue secondary prevention including statin, antiplatelet therapy, management of blood pressure.    (C50.911,  Z17.1) Malignant neoplasm of right breast in female, estrogen receptor negative, unspecified site of breast (H)  Comment: Normal liver function testing.  No evidence of reoccurrence.  Plan: Comprehensive metabolic panel (BMP + Alb, Alk         Phos, ALT, AST, Total. Bili, TP)        Advised continued yearly follow-up with oncology.    (R94.6) Abnormal finding on thyroid function test  Comment: Now normal, appears transient, patient is not on any thyroid supplementation.  Plan: TSH with free T4 reflex        Monitor.    Results for orders placed or performed in visit on 06/22/23   TSH with free T4 reflex     Status: Normal   Result Value Ref Range    TSH 3.09 0.30 - 4.20 uIU/mL   Comprehensive metabolic panel (BMP + Alb, Alk Phos, ALT, AST, Total. Bili, TP)     Status: Abnormal   Result Value Ref Range    Sodium 144 136 - 145 mmol/L    Potassium 4.2 3.4 - 5.3 mmol/L    Chloride 106 98 - 107 mmol/L    Carbon Dioxide (CO2) 25 22 - 29 mmol/L    Anion Gap 13 7 - 15 mmol/L    Urea Nitrogen 6.8 (L) 8.0 - 23.0 mg/dL    Creatinine 0.88 0.51 - 0.95 mg/dL  "   Calcium 9.4 8.8 - 10.2 mg/dL    Glucose 97 70 - 99 mg/dL    Alkaline Phosphatase 108 (H) 35 - 104 U/L    AST 30 0 - 45 U/L    ALT 20 0 - 50 U/L    Protein Total 7.3 6.4 - 8.3 g/dL    Albumin 4.6 3.5 - 5.2 g/dL    Bilirubin Total 0.3 <=1.2 mg/dL    GFR Estimate 69 >60 mL/min/1.73m2      Patient Instructions   Will add another blood pressure medication: amlodipine. You may take this at night also. Keep taking the lisinopril.     Blood tests today.     Yearly check up.              BMI:   Estimated body mass index is 26.28 kg/m  as calculated from the following:    Height as of this encounter: 1.626 m (5' 4.02\").    Weight as of this encounter: 69.5 kg (153 lb 3.2 oz).           Nhung Navarro MD  [facility] [place] Atkins is a 72 year old, presenting for the following health issues:  Recheck Medication        6/22/2023     4:11 PM   Additional Questions   Roomed by Gia/MA   Accompanied by /Gabe         6/22/2023     4:11 PM   Patient Reported Additional Medications   Patient reports taking the following new medications N/A     History of Present Illness       Hypertension: She presents for follow up of hypertension.  She does not check blood pressure  regularly outside of the clinic. Outside blood pressures have been over 140/90. She does not follow a low salt diet.     She eats 0-1 servings of fruits and vegetables daily.She consumes 3 sweetened beverage(s) daily.She exercises with enough effort to increase her heart rate 20 to 29 minutes per day.  She exercises with enough effort to increase her heart rate 7 days per week.   She is taking medications regularly.       Medication Followup of lisinopril    Taking Medication as prescribed: yes    Side Effects:  None    Medication Helping Symptoms:  yes          Review of Systems   Constitutional, HEENT, cardiovascular, pulmonary, gi and gu systems are negative, except as otherwise noted.      Objective    BP (!) 158/98   " "Pulse 96   Temp 98  F (36.7  C) (Tympanic)   Resp 16   Ht 1.626 m (5' 4.02\")   Wt 69.5 kg (153 lb 3.2 oz)   LMP  (LMP Unknown)   SpO2 96%   BMI 26.28 kg/m    Body mass index is 26.28 kg/m .  Physical Exam   GENERAL: Healthy, alert and no distress  EYES: Eyes grossly normal to inspection, conjunctivae and sclerae normal  RESP: Lungs clear to auscultation - no rales, rhonchi or wheezes  CV: Regular rate and rhythm, normal S1 S2, no murmur  MS: No gross musculoskeletal defects noted, no edema  NEURO: Normal strength and tone, mentation intact and speech normal  PSYCH: Mentation appears normal, affect normal/bright     Nhung Navarro MD             "

## 2023-06-23 LAB
ALBUMIN SERPL BCG-MCNC: 4.6 G/DL (ref 3.5–5.2)
ALP SERPL-CCNC: 108 U/L (ref 35–104)
ALT SERPL W P-5'-P-CCNC: 20 U/L (ref 0–50)
ANION GAP SERPL CALCULATED.3IONS-SCNC: 13 MMOL/L (ref 7–15)
AST SERPL W P-5'-P-CCNC: 30 U/L (ref 0–45)
BILIRUB SERPL-MCNC: 0.3 MG/DL
BUN SERPL-MCNC: 6.8 MG/DL (ref 8–23)
CALCIUM SERPL-MCNC: 9.4 MG/DL (ref 8.8–10.2)
CHLORIDE SERPL-SCNC: 106 MMOL/L (ref 98–107)
CREAT SERPL-MCNC: 0.88 MG/DL (ref 0.51–0.95)
DEPRECATED HCO3 PLAS-SCNC: 25 MMOL/L (ref 22–29)
GFR SERPL CREATININE-BSD FRML MDRD: 69 ML/MIN/1.73M2
GLUCOSE SERPL-MCNC: 97 MG/DL (ref 70–99)
POTASSIUM SERPL-SCNC: 4.2 MMOL/L (ref 3.4–5.3)
PROT SERPL-MCNC: 7.3 G/DL (ref 6.4–8.3)
SODIUM SERPL-SCNC: 144 MMOL/L (ref 136–145)
TSH SERPL DL<=0.005 MIU/L-ACNC: 3.09 UIU/ML (ref 0.3–4.2)

## 2023-07-26 ENCOUNTER — ONCOLOGY VISIT (OUTPATIENT)
Dept: ONCOLOGY | Facility: CLINIC | Age: 73
End: 2023-07-26
Attending: NURSE PRACTITIONER
Payer: COMMERCIAL

## 2023-07-26 VITALS
HEART RATE: 81 BPM | OXYGEN SATURATION: 95 % | DIASTOLIC BLOOD PRESSURE: 74 MMHG | TEMPERATURE: 98.8 F | BODY MASS INDEX: 26.12 KG/M2 | RESPIRATION RATE: 12 BRPM | SYSTOLIC BLOOD PRESSURE: 170 MMHG | HEIGHT: 64 IN | WEIGHT: 153 LBS

## 2023-07-26 DIAGNOSIS — Z12.31 ENCOUNTER FOR SCREENING MAMMOGRAM FOR BREAST CANCER: ICD-10-CM

## 2023-07-26 DIAGNOSIS — C50.911 MALIGNANT NEOPLASM OF RIGHT BREAST IN FEMALE, ESTROGEN RECEPTOR NEGATIVE, UNSPECIFIED SITE OF BREAST (H): Primary | ICD-10-CM

## 2023-07-26 DIAGNOSIS — D05.11 DUCTAL CARCINOMA IN SITU (DCIS) OF RIGHT BREAST: ICD-10-CM

## 2023-07-26 DIAGNOSIS — Z17.1 MALIGNANT NEOPLASM OF RIGHT BREAST IN FEMALE, ESTROGEN RECEPTOR NEGATIVE, UNSPECIFIED SITE OF BREAST (H): Primary | ICD-10-CM

## 2023-07-26 PROCEDURE — G0463 HOSPITAL OUTPT CLINIC VISIT: HCPCS | Performed by: NURSE PRACTITIONER

## 2023-07-26 PROCEDURE — 99214 OFFICE O/P EST MOD 30 MIN: CPT | Performed by: NURSE PRACTITIONER

## 2023-07-26 ASSESSMENT — PAIN SCALES - GENERAL: PAINLEVEL: NO PAIN (0)

## 2023-07-26 NOTE — PROGRESS NOTES
[facility] Hematology and Oncology Progress Note    Patient: Milly Loaiza  MRN: [id number]  Date of Service: Jul 26, 2023          Reason for Visit    History of right breast cancer (triple negative) x3    Primary oncologist: Dr. Urena    Assessment and Plan  Recurrent triple negative invasive breast cancer x2 (2004, 2020)  History of DCIS right breast x1 (2009)  Has had recurrent triple negative breast cancer twice now (latest in 2020) and DCIS in 2009.  Underwent surgery and radiation x2.  With her first diagnosis 2004 she did have chemotherapy but did not tolerate this well, so declined repeating chemotherapy with her last diagnosis in 2020.      Clinically, doing well today.   No evidence for recurrent disease on exam.  Mammogram 9/2022 negative    Plan:  -Continue annual screening 3D mammograms every September  -She has preferred annual exams, return in 1 year with Uma/Dr. Urena  -Contact us sooner with any new symptoms/concerns  -We will follow annually and oncology for at least 5-10 years past her last diagnosis in 2020       Cancer Staging   Invasive ductal carcinoma of breast, right (H)  Staging form: Breast, AJCC 8th Edition  - Pathologic: Stage IIA (pT2, pN0, cM0, G3, ER-, MD-, HER2-) - Signed by Faisal Urena MD on 7/21/2022    Malignant neoplasm of female breast (H)  Staging form: Breast, AJCC 8th Edition  - Pathologic: Stage Unknown (pT1b, pNX, cM0, G3, ER-, MD-, HER2-) - Signed by Faiasl Urena MD on 7/21/2022      ECOG Performance    1 - Can't do physically strenuous work, but fully ambyulatory and can do light sedentary work       Oncology history  2004: Stage Ia (T2-N0-M0) right triple negative invasive ductal carcinoma breast  -Detected on screening mammogram  -Surgical pathology: T2 lesion.  No nodes.  ER negative, MD negative, HER2 negative  -Lumpectomy, adjuvant chemo, and adjuvant radiation     2009: Right breast high-grade DCIS.  Indeterminant ER/MD status (not  enough tissue)   -Detected on routine screening mammogram   -Biopsy: High-grade DCIS with necrosis.  Inadequate tissue for ER/OR studies.    -Lumpectomy surgical pathology: No residual cancer    2020: Stage IA ([id number]) right triple negative invasive ductal carcinoma breast  -Ultrasound biopsy: Grade 3 IDC.  ER negative, OR negative, HER2 FISH equivocal, IHC 1+ (oh HER2 negative)  -Lumpectomy pathology: 1 cm IDC, grade 2, no LVI.  No lymph nodes were removed due to prior LND surgery  -Lumpectomy and adjuvant radiation.      Genetic testing negative.    Treatment:  First occurrence (invasive triple negative):  2/2004 -Right breast lumpectomy   3/2004 - began ACx 3 cycles and could not tolerate anymore chemotherapy, followed by radiation.    2004 -adjuvant radiation    2nd occurrence (DCIS):  3/2009 - right lumpectomy without residual invasive cancer    3rd occurrence (invasive triple negative):  1/2020-  right lumpectomy  4/2020 - adjuvant RT  --Due to the small size of the lesion, her prior poor tolerance to chemotherapy chemo was not done.    Interval History   Milly Loaiza is a 72 year old female currently on observation who is seen in oncology clinic for 1-yr follow-up for history of extensive right breast cancer recurrences. It's been 3.5 yrs since her last recurrence.     Initially, she had a triple negative right-sided breast cancer resected in 2004.  Received adjuvant AC for 3 cycles and stopped after that due to intolerance.  Received adjuvant radiation.      Then, had a recurrent high-grade DCIS in 2009 and underwent lumpectomy.      Finally and most recently, in 2020 she had a T1b triple negative high-grade ductal carcinoma and underwent surgical resection and adjuvant re- irradiation.  Due to her prior intolerance of chemotherapy, she did not pursue this and has been under surveillance.     No breast concerns. No significant weight loss; has lost some weight over last few years as she has been active  with her horse. Had some right leg pain x 1 day last week after horse riding, but since resolved and not recurrent issue. No other new sites of pain. No unusual headaches.       Physical Exam    General: alert and cooperative.   accompanies.  HEENT: No icterus  Lymph: No palpable cervical nor axillary adenopathy  Breast: Multiple right breast lumpectomy scars noted.  Dense fibrous tissue underneath.  No discrete lumps.  No skin changes.  Left breast is normal.    Chest: Clear to auscultation bilaterally  Cardiac: RRR  Abdomen: abdomen is soft without significant tenderness, masses, organomegaly or guarding  Extremities: atraumatic, no peripheral edema  Skin: no rashes  Neuro: Nonfocal    Lab Results    No results found for this or any previous visit (from the past 168 hour(s)).    Imaging    No results found.    A total of 30 min were spent today on this visit which included face to face conversation with the patient, EMR review, counseling and co-ordination of care and medical documentation.      Signed by: Uma Hollins NP

## 2023-07-26 NOTE — LETTER
7/26/2023         RE: Milly Loaiza  [address] [facility] [place] [facility] [address]        Dear Colleague,    Thank you for referring your patient, Milly Loaiza, to the [facility] [place]. Please see a copy of my visit note below.    [facility] Hematology and Oncology Progress Note    Patient: Milly Loaiza  MRN: [id number]  Date of Service: Jul 26, 2023          Reason for Visit    History of right breast cancer (triple negative) x3    Primary oncologist: Dr. Urena    Assessment and Plan  Recurrent triple negative invasive breast cancer x2 (2004, 2020)  History of DCIS right breast x1 (2009)  Has had recurrent triple negative breast cancer twice now (latest in 2020) and DCIS in 2009.  Underwent surgery and radiation x2.  With her first diagnosis 2004 she did have chemotherapy but did not tolerate this well, so declined repeating chemotherapy with her last diagnosis in 2020.      Clinically, doing well today.   No evidence for recurrent disease on exam.  Mammogram 9/2022 negative    Plan:  -Continue annual screening 3D mammograms every September  -She has preferred annual exams, return in 1 year with Uma/Dr. Urena  -Contact us sooner with any new symptoms/concerns  -We will follow annually and oncology for at least 5-10 years past her last diagnosis in 2020       Cancer Staging   Invasive ductal carcinoma of breast, right (H)  Staging form: Breast, AJCC 8th Edition  - Pathologic: Stage IIA (pT2, pN0, cM0, G3, ER-, NE-, HER2-) - Signed by Faisal Urena MD on 7/21/2022    Malignant neoplasm of female breast (H)  Staging form: Breast, AJCC 8th Edition  - Pathologic: Stage Unknown (pT1b, pNX, cM0, G3, ER-, NE-, HER2-) - Signed by Faisal Urena MD on 7/21/2022      ECOG Performance    1 - Can't do physically strenuous work, but fully ambyulatory and can do light sedentary work       Oncology history  2004: Stage Ia (T2-N0-M0) right triple negative invasive ductal  carcinoma breast  -Detected on screening mammogram  -Surgical pathology: T2 lesion.  No nodes.  ER negative, SC negative, HER2 negative  -Lumpectomy, adjuvant chemo, and adjuvant radiation     2009: Right breast high-grade DCIS.  Indeterminant ER/SC status (not enough tissue)   -Detected on routine screening mammogram   -Biopsy: High-grade DCIS with necrosis.  Inadequate tissue for ER/SC studies.    -Lumpectomy surgical pathology: No residual cancer    2020: Stage IA ([id number]) right triple negative invasive ductal carcinoma breast  -Ultrasound biopsy: Grade 3 IDC.  ER negative, SC negative, HER2 FISH equivocal, IHC 1+ (oh HER2 negative)  -Lumpectomy pathology: 1 cm IDC, grade 2, no LVI.  No lymph nodes were removed due to prior LND surgery  -Lumpectomy and adjuvant radiation.      Genetic testing negative.    Treatment:  First occurrence (invasive triple negative):  2/2004 -Right breast lumpectomy   3/2004 - began ACx 3 cycles and could not tolerate anymore chemotherapy, followed by radiation.    2004 -adjuvant radiation    2nd occurrence (DCIS):  3/2009 - right lumpectomy without residual invasive cancer    3rd occurrence (invasive triple negative):  1/2020-  right lumpectomy  4/2020 - adjuvant RT  --Due to the small size of the lesion, her prior poor tolerance to chemotherapy chemo was not done.    Interval History   Milly Loaiza is a 72 year old female currently on observation who is seen in oncology clinic for 1-yr follow-up for history of extensive right breast cancer recurrences. It's been 3.5 yrs since her last recurrence.     Initially, she had a triple negative right-sided breast cancer resected in 2004.  Received adjuvant AC for 3 cycles and stopped after that due to intolerance.  Received adjuvant radiation.      Then, had a recurrent high-grade DCIS in 2009 and underwent lumpectomy.      Finally and most recently, in 2020 she had a T1b triple negative high-grade ductal carcinoma and underwent  "surgical resection and adjuvant re- irradiation.  Due to her prior intolerance of chemotherapy, she did not pursue this and has been under surveillance.     No breast concerns. No significant weight loss; has lost some weight over last few years as she has been active with her horse. Had some right leg pain x 1 day last week after horse riding, but since resolved and not recurrent issue. No other new sites of pain. No unusual headaches.       Physical Exam    General: alert and cooperative.   accompanies.  HEENT: No icterus  Lymph: No palpable cervical nor axillary adenopathy  Breast: Multiple right breast lumpectomy scars noted.  Dense fibrous tissue underneath.  No discrete lumps.  No skin changes.  Left breast is normal.    Chest: Clear to auscultation bilaterally  Cardiac: RRR  Abdomen: abdomen is soft without significant tenderness, masses, organomegaly or guarding  Extremities: atraumatic, no peripheral edema  Skin: no rashes  Neuro: Nonfocal    Lab Results    No results found for this or any previous visit (from the past 168 hour(s)).    Imaging    No results found.    A total of 30 min were spent today on this visit which included face to face conversation with the patient, EMR review, counseling and co-ordination of care and medical documentation.      Signed by: Uma Hollins NP    Oncology Rooming Note    July 26, 2023 11:42 AM   Milly Loaiza is a 72 year old female who presents for:    Chief Complaint   Patient presents with     Oncology Clinic Visit     Invasive ductal carcinoma of breast, right - provider visit only     Initial Vitals: BP (!) 170/74 (BP Location: Left arm, Patient Position: Sitting, Cuff Size: Adult Regular)   Pulse 81   Temp 98.8  F (37.1  C) (Tympanic)   Resp 12   Ht 1.626 m (5' 4\")   Wt 69.4 kg (153 lb)   LMP  (LMP Unknown)   SpO2 95%   BMI 26.26 kg/m   Estimated body mass index is 26.26 kg/m  as calculated from the following:    Height as of this encounter: 1.626 " "m (5' 4\").    Weight as of this encounter: 69.4 kg (153 lb). Body surface area is 1.77 meters squared.  No Pain (0) Comment: Data Unavailable   No LMP recorded (lmp unknown). Patient is postmenopausal.  Allergies reviewed: Yes  Medications reviewed: Yes    Medications: Medication refills not needed today.  Pharmacy name entered into [facility]:    [facility], [place] - [address] [facility] [place] [place] [address] [facility]    Clinical concerns:  None      Nunu Robins CMA              Again, thank you for allowing me to participate in the care of your patient.        Sincerely,        Uma Hollins NP  "

## 2023-07-26 NOTE — PROGRESS NOTES
"Oncology Rooming Note    July 26, 2023 11:42 AM   Milly Loaiza is a 72 year old female who presents for:    Chief Complaint   Patient presents with    Oncology Clinic Visit     Invasive ductal carcinoma of breast, right - provider visit only     Initial Vitals: BP (!) 170/74 (BP Location: Left arm, Patient Position: Sitting, Cuff Size: Adult Regular)   Pulse 81   Temp 98.8  F (37.1  C) (Tympanic)   Resp 12   Ht 1.626 m (5' 4\")   Wt 69.4 kg (153 lb)   LMP  (LMP Unknown)   SpO2 95%   BMI 26.26 kg/m   Estimated body mass index is 26.26 kg/m  as calculated from the following:    Height as of this encounter: 1.626 m (5' 4\").    Weight as of this encounter: 69.4 kg (153 lb). Body surface area is 1.77 meters squared.  No Pain (0) Comment: Data Unavailable   No LMP recorded (lmp unknown). Patient is postmenopausal.  Allergies reviewed: Yes  Medications reviewed: Yes    Medications: Medication refills not needed today.  Pharmacy name entered into [facility]:    [facility] [place], [place] [address] [facility] [place] [facility] [place] [facility] [address] [facility]    Clinical concerns:  None      Nunu Robins CMA            "

## 2023-08-22 DIAGNOSIS — I10 HYPERTENSION GOAL BP (BLOOD PRESSURE) < 140/90: ICD-10-CM

## 2023-08-23 RX ORDER — LISINOPRIL 20 MG/1
20 TABLET ORAL DAILY
Qty: 90 TABLET | Refills: 1 | Status: SHIPPED | OUTPATIENT
Start: 2023-08-23 | End: 2024-02-15

## 2023-10-31 ENCOUNTER — HOSPITAL ENCOUNTER (OUTPATIENT)
Dept: MAMMOGRAPHY | Facility: CLINIC | Age: 73
Discharge: HOME | End: 2023-10-31
Attending: NURSE PRACTITIONER | Admitting: NURSE PRACTITIONER
Payer: COMMERCIAL

## 2023-10-31 DIAGNOSIS — Z12.31 ENCOUNTER FOR SCREENING MAMMOGRAM FOR BREAST CANCER: ICD-10-CM

## 2023-10-31 PROCEDURE — 77067 SCR MAMMO BI INCL CAD: CPT

## 2024-02-14 DIAGNOSIS — I10 HYPERTENSION GOAL BP (BLOOD PRESSURE) < 140/90: ICD-10-CM

## 2024-02-15 RX ORDER — LISINOPRIL 20 MG/1
20 TABLET ORAL DAILY
Qty: 90 TABLET | Refills: 1 | Status: SHIPPED | OUTPATIENT
Start: 2024-02-15 | End: 2024-08-02

## 2024-03-06 NOTE — LETTER
12/10/2019         RE: Milly Loaiza  [address]  [place] [facility] [place] [address]        Dear Colleague,    Thank you for referring your patient, Milly Loaiza, to the [facility]. Please see a copy of my visit note below.    CHIEF COMPLAINT AND REASON FOR VISIT:   Breast cancer ,  on followup.     HISTORY OF ONCOLOGY ILLNESS: Milly Loaiza was diagnosed first time end of  through screening mammogram right breast cancer, lumpectomy 2004 T2N0M0 poorly differentiated infiltrating ductal cancer, ER negative, HER-2 negative.   She had 3 cycles of AC, could not tolerate anymore oral chemotherapy, followed by radiation, has been on followup since then.   She had a second breast cancer on the right side diagnosed screening mammogram 2008. Biopsy 2009 at [facility] indicating high-grade DCIS with necrosis, not enough tissue for ER/IL studies. Pathology from lumpectomy 2009 no residual invasive cancer. She has been on followup for both.   She has abnormal screening mammogram 2019 which found 0.9 cm focal asymmetry 2-3 o'clock in the right breast 2.7 cm from the nipple.    INTERVAL HISTORY:  2019 right breast 2-3 o'clock in the right breast 2.7 cm from the nipple 0.9 cm nodule US biopsy found IDC grade III, Er/IL-, Her 2 FISH equivocal, IHC pending.     PAST MEDICAL HISTORY: Nothing other than breast cancer.     MEDICATIONS: No routine medication other than multivitamin.     ALLERGIES: Sulfa.     SOCIAL HISTORY: She lives with her  at home.  She has a lot of animals to take care of.     FAMILY HISTORY: Paternal grandmother was diagnosed with breast cancer and  from that at age 68 back in the 60s.     HABITS: Never smoked. Does not use alcohol.       REVIEW OF SYSTEMS:   Milly Loaiza is in her usual state of health, very active, a middle-aged woman, looks like her stated age. 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 She is taking vitamin D.     PHYSICAL  "EXAMINATION:   VITAL SIGNS: Blood pressure (!) 155/87, pulse 80, temperature 98.4  F (36.9  C), temperature source Tympanic, resp. rate 16, height 1.632 m (5' 4.25\"), weight 71.7 kg (158 lb), SpO2 99 %, not currently breastfeeding.    GENERAL APPEARANCE: Looks like her stated age, not in acute distress.   HEENT: The patient is normocephalic, atraumatic. Pupils are equal react to light. Sclerae are anicteric. Moist oral mucosa. Negative pharynx. No oral thrush. NECK: Supple. No jugular venous distention. Thyroid is not palpable.   LYMPH NODES: Superficial lymphadenopathy is not appreciable in the bilateral cervical, supraclavicular, axillary or inguinal adenopathy. CARDIOVASCULAR: S1, S2 regular with no murmurs or gallops. No carotid or abdominal bruits. PULMONARY: Lungs are clear to auscultation and percussion bilaterally. There is no wheezing or rhonchi. GASTROINTESTINAL: Abdomen is soft, nontender. No hepatosplenomegaly. No signs of ascites. No mass appreciable. MUSCULOSKELETAL/EXTREMITIES: No edema. No cyanotic changes. No signs of joint deformity. No lymphedema. NEUROLOGIC: Cranial nerves II-XII are grossly intact. Sensation intact. Muscle strength and muscle tone symmetrical all through 5/5. BACK: No spinal or paraspinal tenderness. No CVA tenderness.   SKIN: No petechiae. No rash. No signs of cellulitis.   BREASTS: She has retracted nipple on the right side. No palpable lesion.  Left breast exam is negative.    CURRENT LAB DATA REVIEWED  CBC diff is fine, K 3.3, CMP is fine,   Marker is fine    12/2019 right breast 2-3 o'clock in the right breast 2.7 cm from the nipple 0.9 cm nodule US biopsy found IDC grade III, Er/NE-, Her 2 FISH equivocal, IHC pending.     [id number] at 44 in 9/2019, at  48 in 10/2017, at 50 in 10/2016, at 36 in 10/2015, at 46 in 12/2014, 46 in 10/2014  from nl, at 41 in 2011.    CURRENT IMAGE REVIEWED  MA 9/2019- There is a 0.9 cm focal asymmetry at 2-3:00 in the right breast and 2.7 cm from " the nipple.    OLD DATA REVIEW IN SUMMARY:   [id number] at 44 in 9/2018, at  48 in 10/2017, at 50 in 10/2016, at 36 in 10/2015, at 46 in 12/2014, 46 in 10/2014  from nl, at 41 in 2011.      ASSESSMENT AND PLAN:   1. Two-time breast cancer survivor. First time was triple negative breast cancer. The second one was DCIS. She is very worried about recurrence since she has experienced that once already.   We talked extensively about lifestyle modification issues, what things she needs to pay attention to in terms of vitamin D, aspirin intake, exercise and weight control, control alcohol intake, etc. She is very motivated and willing to proceed.   She is follow up.    New abnormal MA on the right side.   12/2019 right breast 2-3 o'clock in the right breast 2.7 cm from the nipple 0.9 cm nodule US biopsy found IDC grade III, ER/SD-, Her 2 FISH equivocal, IHC pending.   Discussed the multidiscipline care for early stage breast cancer treatment.  Since this is her third breast cancer diagnosis, due to the small size of the lesion, her prior poor tolerance to chemotherapy, upfront surgical resection likely will be a valuable option.  We will refer her to surgery.    We need to follow the HER-2 status, systemic chemotherapy plus anti-HER-2 therapy will be offered if this is positive, timing could also be in the preop setting.  If final HER-2 result is negative, upfront surgery seems reasonable clinical T1 be N0 disease recurrence, then will further discuss adjuvant systemic chemotherapy.    She is under a lot of stress due to the third breast cancer diagnosis in her life the last 16 years, I gave her lots of psychosocial support.    2. Elevation of her tumor marker is fluctuating with unclear etiology.   We discuss the draw back of this test, and what is the proper way to randolph it and ASCO guideline on not doing it.   She is aware of it, yet still wants to do it.   Now looking back, this maybe related to her breast cancer recurrence.      3. Mild hypokalemia.    She is not on diuretics she has some diarrhea from anxiety.  We talked about potassium rich diet    Total time is about 40 minutes, more than 25 minutes spent in counseling regarding her third time breast cancer diagnosis, clinical stage, modality treatment plan,      Again, thank you for allowing me to participate in the care of your patient.        Sincerely,        Jacinta Lerma MD, MD     No.

## 2024-05-07 ENCOUNTER — HOSPITAL ENCOUNTER (OUTPATIENT)
Facility: CLINIC | Age: 74
Discharge: HOME | End: 2024-05-07
Attending: EMERGENCY MEDICINE | Admitting: STUDENT IN AN ORGANIZED HEALTH CARE EDUCATION/TRAINING PROGRAM
Payer: COMMERCIAL

## 2024-05-07 ENCOUNTER — ANESTHESIA (OUTPATIENT)
Dept: SURGERY | Facility: CLINIC | Age: 74
End: 2024-05-07
Payer: COMMERCIAL

## 2024-05-07 ENCOUNTER — APPOINTMENT (OUTPATIENT)
Dept: GENERAL RADIOLOGY | Facility: CLINIC | Age: 74
End: 2024-05-07
Attending: EMERGENCY MEDICINE
Payer: COMMERCIAL

## 2024-05-07 ENCOUNTER — ANESTHESIA EVENT (OUTPATIENT)
Dept: SURGERY | Facility: CLINIC | Age: 74
End: 2024-05-07
Payer: COMMERCIAL

## 2024-05-07 VITALS
HEIGHT: 64 IN | BODY MASS INDEX: 26.46 KG/M2 | HEART RATE: 89 BPM | OXYGEN SATURATION: 97 % | DIASTOLIC BLOOD PRESSURE: 86 MMHG | SYSTOLIC BLOOD PRESSURE: 145 MMHG | TEMPERATURE: 97.9 F | WEIGHT: 155 LBS | RESPIRATION RATE: 18 BRPM

## 2024-05-07 DIAGNOSIS — T18.128A ESOPHAGEAL OBSTRUCTION DUE TO FOOD IMPACTION: ICD-10-CM

## 2024-05-07 DIAGNOSIS — Z86.79 HISTORY OF HYPERTENSION: ICD-10-CM
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
## 2024-05-07 LAB — UPPER GI ENDOSCOPY: NORMAL

## 2024-05-07 PROCEDURE — 258N000003 HC RX IP 258 OP 636: Performed by: NURSE ANESTHETIST, CERTIFIED REGISTERED

## 2024-05-07 PROCEDURE — 710N000009 HC RECOVERY PHASE 1, LEVEL 1, PER MIN: Performed by: STUDENT IN AN ORGANIZED HEALTH CARE EDUCATION/TRAINING PROGRAM

## 2024-05-07 PROCEDURE — 99203 OFFICE O/P NEW LOW 30 MIN: CPT | Mod: 25 | Performed by: STUDENT IN AN ORGANIZED HEALTH CARE EDUCATION/TRAINING PROGRAM

## 2024-05-07 PROCEDURE — 250N000009 HC RX 250: Performed by: NURSE ANESTHETIST, CERTIFIED REGISTERED

## 2024-05-07 PROCEDURE — 710N000012 HC RECOVERY PHASE 2, PER MINUTE: Performed by: STUDENT IN AN ORGANIZED HEALTH CARE EDUCATION/TRAINING PROGRAM

## 2024-05-07 PROCEDURE — 360N000075 HC SURGERY LEVEL 2, PER MIN: Performed by: STUDENT IN AN ORGANIZED HEALTH CARE EDUCATION/TRAINING PROGRAM

## 2024-05-07 PROCEDURE — 71046 X-RAY EXAM CHEST 2 VIEWS: CPT

## 2024-05-07 PROCEDURE — 250N000011 HC RX IP 250 OP 636: Performed by: NURSE ANESTHETIST, CERTIFIED REGISTERED

## 2024-05-07 PROCEDURE — 258N000003 HC RX IP 258 OP 636: Performed by: EMERGENCY MEDICINE

## 2024-05-07 PROCEDURE — 99284 EMERGENCY DEPT VISIT MOD MDM: CPT | Performed by: EMERGENCY MEDICINE

## 2024-05-07 PROCEDURE — 250N000011 HC RX IP 250 OP 636: Performed by: EMERGENCY MEDICINE

## 2024-05-07 PROCEDURE — 96375 TX/PRO/DX INJ NEW DRUG ADDON: CPT | Performed by: EMERGENCY MEDICINE

## 2024-05-07 PROCEDURE — 370N000017 HC ANESTHESIA TECHNICAL FEE, PER MIN: Performed by: STUDENT IN AN ORGANIZED HEALTH CARE EDUCATION/TRAINING PROGRAM

## 2024-05-07 PROCEDURE — 96374 THER/PROPH/DIAG INJ IV PUSH: CPT | Mod: 59 | Performed by: EMERGENCY MEDICINE

## 2024-05-07 PROCEDURE — 99285 EMERGENCY DEPT VISIT HI MDM: CPT | Mod: 25 | Performed by: EMERGENCY MEDICINE

## 2024-05-07 RX ORDER — NALOXONE HYDROCHLORIDE 0.4 MG/ML
0.1 INJECTION, SOLUTION INTRAMUSCULAR; INTRAVENOUS; SUBCUTANEOUS
Status: DISCONTINUED | OUTPATIENT
Start: 2024-05-07 | End: 2024-05-07 | Stop reason: HOSPADM

## 2024-05-07 RX ORDER — DEXAMETHASONE SODIUM PHOSPHATE 4 MG/ML
INJECTION, SOLUTION INTRA-ARTICULAR; INTRALESIONAL; INTRAMUSCULAR; INTRAVENOUS; SOFT TISSUE PRN
Status: DISCONTINUED | OUTPATIENT
Start: 2024-05-07 | End: 2024-05-07

## 2024-05-07 RX ORDER — SODIUM CHLORIDE, SODIUM LACTATE, POTASSIUM CHLORIDE, CALCIUM CHLORIDE 600; 310; 30; 20 MG/100ML; MG/100ML; MG/100ML; MG/100ML
INJECTION, SOLUTION INTRAVENOUS CONTINUOUS
Status: DISCONTINUED | OUTPATIENT
Start: 2024-05-07 | End: 2024-05-07 | Stop reason: HOSPADM

## 2024-05-07 RX ORDER — FENTANYL CITRATE 50 UG/ML
25 INJECTION, SOLUTION INTRAMUSCULAR; INTRAVENOUS EVERY 5 MIN PRN
Status: DISCONTINUED | OUTPATIENT
Start: 2024-05-07 | End: 2024-05-07 | Stop reason: HOSPADM

## 2024-05-07 RX ORDER — ONDANSETRON 2 MG/ML
4 INJECTION INTRAMUSCULAR; INTRAVENOUS
Status: COMPLETED | OUTPATIENT
Start: 2024-05-07 | End: 2024-05-07

## 2024-05-07 RX ORDER — MEPERIDINE HYDROCHLORIDE 25 MG/ML
12.5 INJECTION INTRAMUSCULAR; INTRAVENOUS; SUBCUTANEOUS EVERY 5 MIN PRN
Status: DISCONTINUED | OUTPATIENT
Start: 2024-05-07 | End: 2024-05-07 | Stop reason: HOSPADM

## 2024-05-07 RX ORDER — NALOXONE HYDROCHLORIDE 0.4 MG/ML
0.4 INJECTION, SOLUTION INTRAMUSCULAR; INTRAVENOUS; SUBCUTANEOUS
Status: DISCONTINUED | OUTPATIENT
Start: 2024-05-07 | End: 2024-05-08 | Stop reason: HOSPADM

## 2024-05-07 RX ORDER — NALOXONE HYDROCHLORIDE 0.4 MG/ML
0.2 INJECTION, SOLUTION INTRAMUSCULAR; INTRAVENOUS; SUBCUTANEOUS
Status: DISCONTINUED | OUTPATIENT
Start: 2024-05-07 | End: 2024-05-08 | Stop reason: HOSPADM

## 2024-05-07 RX ORDER — FLUMAZENIL 0.1 MG/ML
0.2 INJECTION, SOLUTION INTRAVENOUS
Status: DISCONTINUED | OUTPATIENT
Start: 2024-05-07 | End: 2024-05-08 | Stop reason: HOSPADM

## 2024-05-07 RX ORDER — METOPROLOL TARTRATE 1 MG/ML
1-2 INJECTION, SOLUTION INTRAVENOUS EVERY 5 MIN PRN
Status: DISCONTINUED | OUTPATIENT
Start: 2024-05-07 | End: 2024-05-07 | Stop reason: HOSPADM

## 2024-05-07 RX ORDER — FENTANYL CITRATE 50 UG/ML
50 INJECTION, SOLUTION INTRAMUSCULAR; INTRAVENOUS EVERY 5 MIN PRN
Status: DISCONTINUED | OUTPATIENT
Start: 2024-05-07 | End: 2024-05-07 | Stop reason: HOSPADM

## 2024-05-07 RX ORDER — DEXTROSE MONOHYDRATE AND SODIUM CHLORIDE 5; .9 G/100ML; G/100ML
INJECTION, SOLUTION INTRAVENOUS CONTINUOUS
Status: DISCONTINUED | OUTPATIENT
Start: 2024-05-07 | End: 2024-05-08 | Stop reason: HOSPADM

## 2024-05-07 RX ORDER — LIDOCAINE 40 MG/G
CREAM TOPICAL
Status: DISCONTINUED | OUTPATIENT
Start: 2024-05-07 | End: 2024-05-08 | Stop reason: HOSPADM

## 2024-05-07 RX ORDER — DEXAMETHASONE SODIUM PHOSPHATE 4 MG/ML
4 INJECTION, SOLUTION INTRA-ARTICULAR; INTRALESIONAL; INTRAMUSCULAR; INTRAVENOUS; SOFT TISSUE
Status: DISCONTINUED | OUTPATIENT
Start: 2024-05-07 | End: 2024-05-07 | Stop reason: HOSPADM

## 2024-05-07 RX ORDER — HYDROXYZINE HYDROCHLORIDE 10 MG/1
10 TABLET, FILM COATED ORAL EVERY 6 HOURS PRN
Status: DISCONTINUED | OUTPATIENT
Start: 2024-05-07 | End: 2024-05-07 | Stop reason: HOSPADM

## 2024-05-07 RX ORDER — HYDROMORPHONE HCL IN WATER/PF 6 MG/30 ML
0.2 PATIENT CONTROLLED ANALGESIA SYRINGE INTRAVENOUS EVERY 5 MIN PRN
Status: DISCONTINUED | OUTPATIENT
Start: 2024-05-07 | End: 2024-05-07 | Stop reason: HOSPADM

## 2024-05-07 RX ORDER — SODIUM CHLORIDE, SODIUM LACTATE, POTASSIUM CHLORIDE, CALCIUM CHLORIDE 600; 310; 30; 20 MG/100ML; MG/100ML; MG/100ML; MG/100ML
INJECTION, SOLUTION INTRAVENOUS CONTINUOUS
Status: DISCONTINUED | OUTPATIENT
Start: 2024-05-07 | End: 2024-05-08 | Stop reason: HOSPADM

## 2024-05-07 RX ORDER — ALBUTEROL SULFATE 0.83 MG/ML
2.5 SOLUTION RESPIRATORY (INHALATION) EVERY 4 HOURS PRN
Status: DISCONTINUED | OUTPATIENT
Start: 2024-05-07 | End: 2024-05-07 | Stop reason: HOSPADM

## 2024-05-07 RX ORDER — HYDROMORPHONE HCL IN WATER/PF 6 MG/30 ML
0.4 PATIENT CONTROLLED ANALGESIA SYRINGE INTRAVENOUS EVERY 5 MIN PRN
Status: DISCONTINUED | OUTPATIENT
Start: 2024-05-07 | End: 2024-05-07 | Stop reason: HOSPADM

## 2024-05-07 RX ORDER — ONDANSETRON 4 MG/1
4 TABLET, ORALLY DISINTEGRATING ORAL EVERY 30 MIN PRN
Status: DISCONTINUED | OUTPATIENT
Start: 2024-05-07 | End: 2024-05-07 | Stop reason: HOSPADM

## 2024-05-07 RX ORDER — ONDANSETRON 2 MG/ML
4 INJECTION INTRAMUSCULAR; INTRAVENOUS EVERY 30 MIN PRN
Status: DISCONTINUED | OUTPATIENT
Start: 2024-05-07 | End: 2024-05-07 | Stop reason: HOSPADM

## 2024-05-07 RX ORDER — ONDANSETRON 2 MG/ML
INJECTION INTRAMUSCULAR; INTRAVENOUS PRN
Status: DISCONTINUED | OUTPATIENT
Start: 2024-05-07 | End: 2024-05-07

## 2024-05-07 RX ORDER — LIDOCAINE HYDROCHLORIDE 20 MG/ML
INJECTION, SOLUTION INFILTRATION; PERINEURAL PRN
Status: DISCONTINUED | OUTPATIENT
Start: 2024-05-07 | End: 2024-05-07

## 2024-05-07 RX ORDER — PROPOFOL 10 MG/ML
INJECTION, EMULSION INTRAVENOUS PRN
Status: DISCONTINUED | OUTPATIENT
Start: 2024-05-07 | End: 2024-05-07

## 2024-05-07 RX ORDER — SODIUM CHLORIDE, SODIUM LACTATE, POTASSIUM CHLORIDE, CALCIUM CHLORIDE 600; 310; 30; 20 MG/100ML; MG/100ML; MG/100ML; MG/100ML
INJECTION, SOLUTION INTRAVENOUS CONTINUOUS PRN
Status: DISCONTINUED | OUTPATIENT
Start: 2024-05-07 | End: 2024-05-07

## 2024-05-07 RX ORDER — FENTANYL CITRATE 50 UG/ML
INJECTION, SOLUTION INTRAMUSCULAR; INTRAVENOUS PRN
Status: DISCONTINUED | OUTPATIENT
Start: 2024-05-07 | End: 2024-05-07

## 2024-05-07 RX ADMIN — PROPOFOL 150 MCG/KG/MIN: 10 INJECTION, EMULSION INTRAVENOUS at 21:24

## 2024-05-07 RX ADMIN — MIDAZOLAM 2 MG: 1 INJECTION INTRAMUSCULAR; INTRAVENOUS at 21:16

## 2024-05-07 RX ADMIN — PROPOFOL 150 MG: 10 INJECTION, EMULSION INTRAVENOUS at 21:22

## 2024-05-07 RX ADMIN — LIDOCAINE HYDROCHLORIDE 80 MG: 20 INJECTION, SOLUTION INFILTRATION; PERINEURAL at 21:22

## 2024-05-07 RX ADMIN — Medication 100 MG: at 21:22

## 2024-05-07 RX ADMIN — GLUCAGON 1 MG: 1 INJECTION, POWDER, LYOPHILIZED, FOR SOLUTION INTRAMUSCULAR; INTRAVENOUS at 19:28

## 2024-05-07 RX ADMIN — DEXAMETHASONE SODIUM PHOSPHATE 4 MG: 4 INJECTION, SOLUTION INTRA-ARTICULAR; INTRALESIONAL; INTRAMUSCULAR; INTRAVENOUS; SOFT TISSUE at 21:25

## 2024-05-07 RX ADMIN — SODIUM CHLORIDE, POTASSIUM CHLORIDE, SODIUM LACTATE AND CALCIUM CHLORIDE: 600; 310; 30; 20 INJECTION, SOLUTION INTRAVENOUS at 21:18

## 2024-05-07 RX ADMIN — DEXTROSE AND SODIUM CHLORIDE: 5; 900 INJECTION, SOLUTION INTRAVENOUS at 19:32

## 2024-05-07 RX ADMIN — ONDANSETRON 4 MG: 2 INJECTION INTRAMUSCULAR; INTRAVENOUS at 19:29

## 2024-05-07 RX ADMIN — ONDANSETRON 4 MG: 2 INJECTION INTRAMUSCULAR; INTRAVENOUS at 21:25

## 2024-05-07 RX ADMIN — SODIUM CHLORIDE 500 ML: 9 INJECTION, SOLUTION INTRAVENOUS at 19:29

## 2024-05-07 RX ADMIN — FENTANYL CITRATE 100 MCG: 50 INJECTION INTRAMUSCULAR; INTRAVENOUS at 21:22

## 2024-05-07 ASSESSMENT — ACTIVITIES OF DAILY LIVING (ADL)
ADLS_ACUITY_SCORE: 35

## 2024-05-07 ASSESSMENT — COLUMBIA-SUICIDE SEVERITY RATING SCALE - C-SSRS
2. HAVE YOU ACTUALLY HAD ANY THOUGHTS OF KILLING YOURSELF IN THE PAST MONTH?: NO
1. IN THE PAST MONTH, HAVE YOU WISHED YOU WERE DEAD OR WISHED YOU COULD GO TO SLEEP AND NOT WAKE UP?: NO
6. HAVE YOU EVER DONE ANYTHING, STARTED TO DO ANYTHING, OR PREPARED TO DO ANYTHING TO END YOUR LIFE?: NO

## 2024-05-07 ASSESSMENT — ENCOUNTER SYMPTOMS
EYES NEGATIVE: 1
NEUROLOGICAL NEGATIVE: 1
ALLERGIC/IMMUNOLOGIC NEGATIVE: 1
GASTROINTESTINAL NEGATIVE: 1
CARDIOVASCULAR NEGATIVE: 1
RESPIRATORY NEGATIVE: 1
TROUBLE SWALLOWING: 1
ENDOCRINE NEGATIVE: 1
MUSCULOSKELETAL NEGATIVE: 1
HEMATOLOGIC/LYMPHATIC NEGATIVE: 1
PSYCHIATRIC NEGATIVE: 1

## 2024-05-08 NOTE — ANESTHESIA PROCEDURE NOTES
Airway       Patient location during procedure: OR       Procedure Start/Stop Times: 5/7/2024 9:23 PM  Staff -        CRNA: Peter Rosario APRN CRNA       Performed By: CRNA  Consent for Airway        Urgency: elective  Indications and Patient Condition       Indications for airway management: inocencio-procedural and airway protection       Induction type:RSI       Mask difficulty assessment: 1 - vent by mask    Final Airway Details       Final airway type: endotracheal airway       Successful airway: ETT - single  Endotracheal Airway Details        ETT size (mm): 7.0       Cuffed: yes       Cuff volume (mL): 8       Successful intubation technique: video laryngoscopy       VL Blade Size: Santana 3       Grade View of Cords: 1       Adjucts: stylet       Position: Right       Measured from: lips       Secured at (cm): 21       Bite block used: None    Post intubation assessment        Placement verified by: capnometry, equal breath sounds and chest rise        Number of attempts at approach: 1       Number of other approaches attempted: 0       Secured with: tape       Ease of procedure: easy       Dentition: Intact    Medication(s) Administered   Medication Administration Time: 5/7/2024 9:23 PM

## 2024-05-08 NOTE — ANESTHESIA CARE TRANSFER NOTE
Patient: Milly Loaiza    Procedure: Procedure(s):  ESOPHAGOGASTRODUODENOSCOPY, WITH FOREIGN BODY REMOVAL       Diagnosis: Esophageal obstruction due to food impaction [T18.128A, W44.F3XA]  Diagnosis Additional Information: No value filed.    Anesthesia Type:   General     Note:    Oropharynx: oropharynx clear of all foreign objects  Level of Consciousness: drowsy and awake  Oxygen Supplementation: face mask  Level of Supplemental Oxygen (L/min / FiO2): 10  Independent Airway: airway patency satisfactory and stable  Dentition: dentition unchanged  Vital Signs Stable: post-procedure vital signs reviewed and stable  Report to RN Given: handoff report given  Patient transferred to: PACU    Handoff Report: Identifed the Patient, Identified the Reponsible Provider, Reviewed the pertinent medical history, Discussed the surgical course, Reviewed Intra-OP anesthesia mangement and issues during anesthesia, Set expectations for post-procedure period and Allowed opportunity for questions and acknowledgement of understanding    Vitals:  Vitals Value Taken Time   BP     Temp     Pulse 99 05/07/24 2156   Resp 13 05/07/24 2156   SpO2 98 % 05/07/24 2156   Vitals shown include unfiled device data.    Electronically Signed By: FABIANA Hernandes CRNA  May 7, 2024  9:57 PM

## 2024-05-08 NOTE — ED TRIAGE NOTES
"Pt has \"ribs stuck in my throat.\"  Pt unable to swallow own secretions.  No respiratory distress.     Triage Assessment (Adult)       Row Name 05/07/24 1910          Triage Assessment    Airway WDL WDL        Respiratory WDL    Respiratory WDL WDL        Cognitive/Neuro/Behavioral WDL    Cognitive/Neuro/Behavioral WDL WDL                     "

## 2024-05-08 NOTE — ANESTHESIA PREPROCEDURE EVALUATION
Anesthesia Pre-Procedure Evaluation    Patient: Milly Loaiza   MRN: [id number] : 1950        Procedure : Procedure(s):  ESOPHAGOGASTRODUODENOSCOPY, WITH FOREIGN BODY REMOVAL          Past Medical History:   Diagnosis Date    Acute reaction to stress 10/9/2019    Breast cancer (H)     Malignant neoplasm of breast (female), unspecified site       Past Surgical History:   Procedure Laterality Date    BIOPSY BREAST      LUMPECTOMY BREAST Right 2020    Procedure: Right Wire Localized Breast Lumpectomy(wire placement at 8:30);  Surgeon: Gallo King DO;  Location: [facility] OR    SURGICAL HISTORY OF -       Cervical biopsy    SURGICAL HISTORY OF -       Tubal ligation    SURGICAL HISTORY OF -   2004    Right mastectomy    SURGICAL HISTORY OF -   3-23-09    Needle-guided left breast biopsy.      Allergies   Allergen Reactions    Dust Mites Other (See Comments)     Dust     Coconut Fatty Acids Itching and Rash     Purple blotches    Penicillins Rash    Sulfa Antibiotics Rash     Purple blotches      Social History     Tobacco Use    Smoking status: Never    Smokeless tobacco: Never   Substance Use Topics    Alcohol use: Yes     Comment: occ.      Wt Readings from Last 1 Encounters:   24 70.3 kg (155 lb)        Anesthesia Evaluation   Pt has had prior anesthetic. Type: General.        ROS/MED HX  ENT/Pulmonary:       Neurologic:       Cardiovascular:     (+)  hypertension- -   -  - -                                 Previous cardiac testing   Echo: Date: 3/17 Results:  Interpretation Summary     A cardiac source of embolus was not identified.  The rhythm was normal sinus.  The left ventricle is normal in structure, function and size.  The visual ejection fraction is estimated at 60-65%.  The right ventricle is normal in structure, function and size.  There is trace aortic regurgitation.        No old studies available for  comparison.  _____________________________________________________________________________  __        Left Ventricle  The left ventricle is normal in structure, function and size. The visual  ejection fraction is estimated at 60-65%. There is no thrombus seen in the  left ventricle.     Right Ventricle  The right ventricle is normal in structure, function and size. There is no  mass or thrombus in the right ventricle.     Atria  Normal left atrial size. Right atrial size is normal. There is no atrial shunt  seen.     Mitral Valve  The mitral valve leaflets appear normal. There is no evidence of stenosis,  fluttering, or prolapse. There is no mitral regurgitation noted. There is no  mitral valve stenosis.        Tricuspid Valve  Normal tricuspid valve. No tricuspid regurgitation. There is no tricuspid  stenosis.     Aortic Valve  The aortic valve is trileaflet. There is trace aortic regurgitation. No aortic  stenosis is present.     Pulmonic Valve  Normal pulmonic valve. There is no pulmonic valvular regurgitation. There is  no pulmonic valvular stenosis.     Vessels  The aortic root is normal size. Normal size ascending aorta. The IVC is normal  in size and reactivity with respiration, suggesting normal central venous  pressure. The pulmonary artery is normal size.     Pericardium  The pericardium appears normal. There is no pleural effusion.        Rhythm  The rhythm was normal sinus.    Stress Test:  Date: Results:    ECG Reviewed:  Date: 10/19 Results:  Sinus  Rhythm   WITHIN NORMAL LIMITS    Cath:  Date: Results:      METS/Exercise Tolerance:     Hematologic:       Musculoskeletal:       GI/Hepatic:       Renal/Genitourinary:       Endo:       Psychiatric/Substance Use:       Infectious Disease:       Malignancy:   (+) Malignancy, History of Breast.    Other:            Physical Exam    Airway  airway exam normal      Mallampati: I   TM distance: > 3 FB   Neck ROM: full   Mouth opening: > 3 cm    Respiratory  Devices and Support         Dental       (+) Minor Abnormalities - some fillings, tiny chips      Cardiovascular   cardiovascular exam normal       Rhythm and rate: regular and normal     Pulmonary   pulmonary exam normal        breath sounds clear to auscultation           OUTSIDE LABS:  CBC:   Lab Results   Component Value Date    WBC 5.0 12/04/2021    WBC 6.6 11/09/2020    HGB 13.0 12/04/2021    HGB 12.1 11/09/2020    HCT 39.4 12/04/2021    HCT 36.6 11/09/2020     12/04/2021     11/09/2020     BMP:   Lab Results   Component Value Date     06/22/2023     06/23/2022    POTASSIUM 4.2 06/22/2023    POTASSIUM 3.6 06/23/2022    CHLORIDE 106 06/22/2023    CHLORIDE 108 06/23/2022    CO2 25 06/22/2023    CO2 25 06/23/2022    BUN 6.8 (L) 06/22/2023    BUN 10 06/23/2022    CR 0.88 06/22/2023    CR 0.99 06/23/2022    GLC 97 06/22/2023    GLC 98 06/23/2022     COAGS:   Lab Results   Component Value Date    PTT 28 09/21/2018    INR 0.96 09/21/2018     POC:   Lab Results   Component Value Date     (H) 09/21/2018     HEPATIC:   Lab Results   Component Value Date    ALBUMIN 4.6 06/22/2023    PROTTOTAL 7.3 06/22/2023    ALT 20 06/22/2023    AST 30 06/22/2023    ALKPHOS 108 (H) 06/22/2023    BILITOTAL 0.3 06/22/2023     OTHER:   Lab Results   Component Value Date    LUCIAN 9.4 06/22/2023    TSH 3.09 06/22/2023    T4 0.87 12/04/2021       Anesthesia Plan    ASA Status:  2, emergent    NPO Status:  NPO Appropriate    Anesthesia Type: General.     - Airway: ETT   Induction: Intravenous, RSI, Propofol.   Maintenance: TIVA.        Consents    Anesthesia Plan(s) and associated risks, benefits, and realistic alternatives discussed. Questions answered and patient/representative(s) expressed understanding.     - Discussed: Risks, Benefits and Alternatives for BOTH SEDATION and the PROCEDURE were discussed     - Discussed with:  Patient      - Extended Intubation/Ventilatory Support Discussed: No.      - Patient  "is DNR/DNI Status: No     Use of blood products discussed: No .     Postoperative Care    Pain management: IV analgesics.   PONV prophylaxis: Ondansetron (or other 5HT-3), Dexamethasone or Solumedrol     Comments:               FABIANA Hernandes CRNA    I have reviewed the pertinent notes and labs in the chart from the past 30 days and (re)examined the patient.  Any updates or changes from those notes are reflected in this note.             # Drug Induced Platelet Defect: home medication list includes an antiplatelet medication  # Overweight: Estimated body mass index is 26.61 kg/m  as calculated from the following:    Height as of this encounter: 1.626 m (5' 4\").    Weight as of this encounter: 70.3 kg (155 lb).      "

## 2024-05-08 NOTE — CONSULTS
Surgical Consultation/History and Physical  [facility]    Milly is seen in consultation for esophageal foreign body, at the request of Dr. Rafi Davis    Chief Complaint:  esophageal foreign body    History of Present Illness: Milly Loaiza is a 73 year old female presents with past medical history of TIA and prior breast cancer who presents to the emergency department after she felt like food was stuck in her esophagus.  She was having ribs with her family this evening when she felt like food got stuck in her esophagus.  She tried drinking water but sensation persisted and was associated with emesis.  She states she is not able to swallow saliva without gagging.  Currently she states that throat is uncomfortable, but does not have any significant pain.  Denies any dyspnea or chest pain.  No fevers or chills.  She states that this is never happened before.    Patient Active Problem List   Diagnosis    Malignant neoplasm of female breast (H)    DCIS (ductal carcinoma in situ)    CARDIOVASCULAR SCREENING; LDL GOAL LESS THAN 160    TIA (transient ischemic attack)    Advanced directives, counseling/discussion    Lacunar infarction (H)    Hypertension goal BP (blood pressure) < 140/90    Invasive ductal carcinoma of breast, right (H)    Need for hepatitis C screening test       Past Medical History:   Diagnosis Date    Acute reaction to stress 10/9/2019    Breast cancer (H)     Malignant neoplasm of breast (female), unspecified site        Past Surgical History:   Procedure Laterality Date    BIOPSY BREAST      LUMPECTOMY BREAST Right 1/27/2020    Procedure: Right Wire Localized Breast Lumpectomy(wire placement at 8:30);  Surgeon: Gallo King DO;  Location: [facility] OR    SURGICAL HISTORY OF -   2003    Cervical biopsy    SURGICAL HISTORY OF -   1979    Tubal ligation    SURGICAL HISTORY OF -   2/2/2004    Right mastectomy    SURGICAL HISTORY OF -   3-23-09    Needle-guided left breast  "biopsy.       Family History   Problem Relation Age of Onset    Alcohol/Drug Mother     Respiratory Mother         emphazyma    Cerebrovascular Disease Maternal Grandmother     Osteoporosis Maternal Grandmother     Breast Cancer Paternal Grandmother     Depression Sister     Diabetes Other     Cerebrovascular Disease Other     Cancer Other         lymphademia    Respiratory Other         emphazyma    Thyroid Disease Sister        Social History     Tobacco Use    Smoking status: Never    Smokeless tobacco: Never   Substance Use Topics    Alcohol use: Yes     Comment: occ.        History   Drug Use No       Current Outpatient Medications   Medication Sig Dispense Refill    acetaminophen (TYLENOL) 325 MG tablet Take 325 mg by mouth every 6 hours as needed for mild pain      aspirin 325 MG tablet Take 1 tablet (325 mg) by mouth daily 120 tablet 3    calcium carbonate (OS-LUCIAN 500 MG [place]. [place]) 500 MG tablet Take 1,000 mg by mouth daily      DAILY MULTI VITAMIN/MINERALS OR Take 1 tablet by mouth every evening       lisinopril (ZESTRIL) 20 MG tablet TAKE 1 TABLET BY MOUTH DAILY FOR BLOOD PRESSURE 90 tablet 1       Allergies   Allergen Reactions    Dust Mites Other (See Comments)     Dust     Coconut Fatty Acids Itching and Rash     Purple blotches    Penicillins Rash    Sulfa Antibiotics Rash     Purple blotches       Review of Systems:   10 point ROS negative other than was listed in HPI    Physical Exam:  BP (!) 178/102   Pulse 88   Temp 98.1  F (36.7  C) (Oral)   Ht 1.626 m (5' 4\")   Wt 70.3 kg (155 lb)   LMP  (LMP Unknown)   SpO2 98%   BMI 26.61 kg/m      Constitutional- No acute distress, well nourished, non-toxic  Eyes: Anicteric, no injection.  PERRL  ENT:  Normocephalic, atraumatic, Nose midline, moist mucus membranes  Neck - supple, no LAD  Respiratory- Nonlabored breathing on room air  Cardiovascular - Extremities warm and well perfused.  Abdomen - Soft, non-tender, Neuro - No focal neuro deficits, Alert " and oriented x 3  Psych: Appropriate mood and affect  Musculoskeletal: Normal gait, symmetric strength.  FROM upper and lower extremities.  Skin: Warm, Dry    CBC RESULTS:   Recent Labs   Lab Test 12/04/21  1132   WBC 5.0   RBC 4.45   HGB 13.0   HCT 39.4   MCV 89   MCH 29.2   MCHC 33.0   RDW 12.5        Last Comprehensive Metabolic Panel:  Lab Results   Component Value Date     06/22/2023    POTASSIUM 4.2 06/22/2023    CHLORIDE 106 06/22/2023    CO2 25 06/22/2023    ANIONGAP 13 06/22/2023    GLC 97 06/22/2023    BUN 6.8 (L) 06/22/2023    CR 0.88 06/22/2023    GFRESTIMATED 69 06/22/2023    LUCIAN 9.4 06/22/2023       CXR 05/07/24                                                                    IMPRESSION: Heart size is normal. Lungs are clear of CHF, lobar consolidation, or effusion. No pneumothorax. Tortuous thoracic aorta. Clips in the right breast.    Assessment/Plan:  1. Milly Loaiza is a 73 year old female who presented to the emergency department with concerns for retained esophageal foreign body after having ribs this evening.  Currently protecting her airway, nontoxic.  No evidence of pneumothorax on chest x-ray.  Will plan for upper endoscopy and removal of esophageal foreign body in the operating room laurenight.        Luisito Ball MD on 5/7/2024 at 8:42 PM

## 2024-05-08 NOTE — MEDICATION SCRIBE - ADMISSION MEDICATION HISTORY
Medication Scribe Admission Medication History    Admission medication history is complete. The information provided in this note is only as accurate as the sources available at the time of the update.    Information Source(s): Patient via in-person and at desk    Pertinent Information: Patient reported she stopped taking Amlodipine 5 mg    Changes made to PTA medication list:  Added: None  Deleted: Amlodipine 5mg  Changed: None    Allergies reviewed with patient and updates made in EHR: yes    Medication History Completed By: ELVIE SALAZAR 5/7/2024 8:18 PM    PTA Med List   Medication Sig Last Dose    acetaminophen (TYLENOL) 325 MG tablet Take 325 mg by mouth every 6 hours as needed for mild pain 5/6/2024 at pm    aspirin 325 MG tablet Take 1 tablet (325 mg) by mouth daily 5/7/2024 at am    calcium carbonate (OS-LUCIAN 500 MG [place]. [place]) 500 MG tablet Take 1,000 mg by mouth daily 5/6/2024 at hs    DAILY MULTI VITAMIN/MINERALS OR Take 1 tablet by mouth every evening  5/6/2024 at hs    lisinopril (ZESTRIL) 20 MG tablet TAKE 1 TABLET BY MOUTH DAILY FOR BLOOD PRESSURE 5/6/2024 at hs

## 2024-05-08 NOTE — ED PROVIDER NOTES
History     Chief Complaint   Patient presents with    Airway Obstruction     HPI  Milly Loaiza is a 73 year old female who presents with concern about a food bolus.  Patient on intake reported that her piece of bread was stuck in her throat she has been unable to swallow her secretions.    Reviewed the medical record.  Reviewed visit on 6/22/2023-patient has a history of breast cancer, TIA/lacunar infarct, hypertension.     Patient's current prescribed medications were reviewed with the patient at the bedside.    On examination patient reports she was eating rib dinner when she began to feel like she was choking with her ribs got stuck in her throat.  No prior history of esophageal food bolus or endoscopy.  She has not been previously told that she has any Schatzki's rings.  She is on lisinopril.  She arrived by car with her spouse for further assessment and care   Due to trouble swallowing her secretions    Allergies:  Allergies   Allergen Reactions    Dust Mites Other (See Comments)     Dust     Coconut Fatty Acids Itching and Rash     Purple blotches    Penicillins Rash    Sulfa Antibiotics Rash     Purple blotches       Problem List:    Patient Active Problem List    Diagnosis Date Noted    Need for hepatitis C screening test 06/23/2022     Priority: Medium    Invasive ductal carcinoma of breast, right (H) 12/02/2019     Priority: Medium     Added automatically from request for surgery [id number]      Hypertension goal BP (blood pressure) < 140/90 10/09/2019     Priority: Medium    Lacunar infarction (H) 03/31/2019     Priority: Medium    Advanced directives, counseling/discussion 04/05/2017     Priority: Medium     Advance Care Planning 4/5/2017: Information declined            TIA (transient ischemic attack) 03/29/2017     Priority: Medium    CARDIOVASCULAR SCREENING; LDL GOAL LESS THAN 160 10/31/2010     Priority: Medium    DCIS (ductal carcinoma in situ) 03/27/2009     Priority: Medium    Malignant  neoplasm of female breast (H) 05/27/2005     Priority: Medium     Problem list name updated by automated process. Provider to review          Past Medical History:    Past Medical History:   Diagnosis Date    Acute reaction to stress 10/9/2019    Breast cancer (H)     Malignant neoplasm of breast (female), unspecified site        Past Surgical History:    Past Surgical History:   Procedure Laterality Date    BIOPSY BREAST      LUMPECTOMY BREAST Right 1/27/2020    Procedure: Right Wire Localized Breast Lumpectomy(wire placement at 8:30);  Surgeon: Gallo King DO;  Location: [facility] OR    SURGICAL HISTORY OF -   2003    Cervical biopsy    SURGICAL HISTORY OF -   1979    Tubal ligation    SURGICAL HISTORY OF -   2/2/2004    Right mastectomy    SURGICAL HISTORY OF -   3-23-09    Needle-guided left breast biopsy.       Family History:    Family History   Problem Relation Age of Onset    Alcohol/Drug Mother     Respiratory Mother         emphazyma    Cerebrovascular Disease Maternal Grandmother     Osteoporosis Maternal Grandmother     Breast Cancer Paternal Grandmother     Depression Sister     Diabetes Other     Cerebrovascular Disease Other     Cancer Other         lymphademia    Respiratory Other         emphazyma    Thyroid Disease Sister        Social History:  Marital Status:   [2]  Social History     Tobacco Use    Smoking status: Never    Smokeless tobacco: Never   Vaping Use    Vaping status: Never Used   Substance Use Topics    Alcohol use: Yes     Comment: occ.    Drug use: No        Medications:    acetaminophen (TYLENOL) 325 MG tablet  aspirin 325 MG tablet  calcium carbonate (OS-LUCIAN 500 MG [place]) 500 MG tablet  DAILY MULTI VITAMIN/MINERALS OR  lisinopril (ZESTRIL) 20 MG tablet          Review of Systems   Constitutional:         Trouble swallowing after eating ribs   HENT:  Positive for trouble swallowing.    Eyes: Negative.    Respiratory: Negative.     Cardiovascular: Negative.   "  Gastrointestinal: Negative.    Endocrine: Negative.    Genitourinary: Negative.    Musculoskeletal: Negative.    Skin: Negative.    Allergic/Immunologic: Negative.    Neurological: Negative.    Hematological: Negative.    Psychiatric/Behavioral: Negative.     All other systems reviewed and are negative.      Physical Exam   BP: (!) 178/102  Pulse: 88  Temp: 98.1  F (36.7  C)  Height: 162.6 cm (5' 4\")  Weight: 70.3 kg (155 lb)  SpO2: 98 %      Physical Exam  Constitutional:       General: She is not in acute distress.     Appearance: Normal appearance. She is not ill-appearing, toxic-appearing or diaphoretic.   HENT:      Head: Normocephalic and atraumatic.      Mouth/Throat:      Mouth: Mucous membranes are moist.   Eyes:      Extraocular Movements: Extraocular movements intact.      Pupils: Pupils are equal, round, and reactive to light.   Cardiovascular:      Rate and Rhythm: Normal rate and regular rhythm.      Pulses: Normal pulses.   Pulmonary:      Effort: Pulmonary effort is normal. No respiratory distress.      Breath sounds: Normal breath sounds. No stridor. No wheezing, rhonchi or rales.   Chest:      Chest wall: No tenderness.   Musculoskeletal:      Cervical back: Normal range of motion and neck supple.   Skin:     Capillary Refill: Capillary refill takes less than 2 seconds.      Coloration: Skin is not jaundiced.      Findings: No bruising, erythema, lesion or rash.   Neurological:      General: No focal deficit present.      Mental Status: She is alert and oriented to person, place, and time.      Cranial Nerves: No cranial nerve deficit.      Sensory: No sensory deficit.      Motor: No weakness.      Coordination: Coordination normal.      Gait: Gait normal.      Deep Tendon Reflexes: Reflexes normal.   Psychiatric:         Mood and Affect: Mood normal.         Behavior: Behavior normal.         Thought Content: Thought content normal.         Judgment: Judgment normal.         ED Course      " "  Procedures              Critical Care time:  none             ED medications:  Medications   dextrose 5% and 0.9% NaCl infusion ( Intravenous $New Bag 5/7/24 1932)   sodium chloride 0.9% BOLUS 500 mL (0 mLs Intravenous Stopped 5/7/24 1932)   glucagon injection 1 mg (1 mg Intravenous $Given 5/7/24 1928)   ondansetron (ZOFRAN) injection 4 mg (4 mg Intravenous $Given 5/7/24 1929)     ED Vitals:  Vitals:    05/07/24 1908 05/07/24 1952   BP: (!) 178/102 (!) 177/92   Pulse: 88 99   Temp: 98.1  F (36.7  C)    TempSrc: Oral    SpO2: 98% 96%   Weight: 70.3 kg (155 lb)    Height: 1.626 m (5' 4\")       ED labs and imaging:  Results for orders placed or performed during the hospital encounter of 05/07/24   Chest XR,  PA & LAT     Status: None    Narrative    EXAM: XR CHEST 2 VIEWS  LOCATION: [facility]  DATE: 5/7/2024    INDICATION: Esophageal food bolus.  Surgery request x ray to evaluate for perforation vs other acute cardiopulmonary process. Unable to swallow secretions. Choking.  COMPARISON: None.      Impression    IMPRESSION: Heart size is normal. Lungs are clear of CHF, lobar consolidation, or effusion. No pneumothorax. Tortuous thoracic aorta. Clips in the right breast.       Assessments & Plan (with Medical Decision Making)   Assessment Summary and Clinical impression: 73-year-old female who presented with concern about esophageal food bolus.  She was concerned that a piece of rib was stuck in her throat prior to arrival while eating dinner with friends and family.  She was having trouble swallowing her secretions but no respiratory distress.  She was 98% on room air.  After trial medications to manage her esophageal food bolus without success surgery on-call was consulted to help with further diagnostic and therapeutic intervention .  Patient was transferred to OR for further definitive care.    ED course and plan:  Reviewed the medical record.  On rapid assessment patient was retching " but in no acute distress.  Trial EZ gas and glucagon without significant relief.  Consulted Dr. SOTERO Ball at 7.45pm who planned to provide further care with diagnostic endoscopy and therapeutic intervention as medically necessary.  X-ray chest was requested to ensure there was no accompanying perforation.  X-ray reviewed independently and the radiology report was also reviewed.  No evidence of perforation.  Normal cardiac silhouette.   Patient transferred to the OR for further care.  Patient was likely to be discharged after recovery post procedure. See surgery consult note and procedure note for further details. .  Plan of care reviewed with patient and spouse at bedside /        Disclaimer: This note consists of symbols derived from keyboarding, dictation and/or voice recognition software. As a result, there may be errors in the script that have gone undetected. Please consider this when interpreting information found in this chart.   I have reviewed the nursing notes.    I have reviewed the findings, diagnosis, plan and need for follow up with the patient.           Medical Decision Making  The patient's presentation was of high complexity (esophageal food bolus ).    The patient's evaluation involved:  ordering and/or review of 2 test(s) in this encounter (diagnostic imaging, surgery consultation, medications to manage symptoms)    The patient's management necessitated moderate risk (prescription drug management including medications given in the ED).        New Prescriptions    No medications on file       Final diagnoses:   Esophageal obstruction due to food impaction - ribs    History of hypertension       5/7/2024   [facility] EMERGENCY DEPT       Papito Davis MD  05/08/24 0128

## 2024-05-08 NOTE — OP NOTE
Procedure:             Upper GI endoscopy   Indications:           Foreign body in the esophagus   Providers:             GENO CAMEJO   Referring MD:            Complications:         No immediate complications.   _______________________________________________________________________________   Procedure:            Pre-Anesthesia Assessment:                          - Prior to the procedure, a History and Physical was                          performed, and patient medications, allergies and                          sensitivities were reviewed. The patient's tolerance                          of previous anesthesia was reviewed.                          - The risks and benefits of the procedure and the                          sedation options and risks were discussed with the                          patient. All questions were answered and informed                          consent was obtained.                          - Patient identification and proposed procedure were                          verified prior to the procedure by the physician, the                          nurse and the anesthetist. The procedure was verified                          in the pre-procedure area in the procedure room.                          - ASA Grade Assessment: II - A patient with mild                          systemic disease.                          After obtaining informed consent, the endoscope was                          passed under direct vision. Throughout the procedure,                          the patient's blood pressure, pulse, and oxygen                          saturations were monitored continuously. The Barcode                          0145 was introduced through the mouth, and advanced to                          the second part of duodenum. The upper GI endoscopy                          was technically difficult and complex due to presence                          of food. The patient tolerated the  procedure well.                                                                                    Findings:       Endotracheal tube in place        Food was found in the proximal esophagus. The retained pieces of food        were gently advanced antegrade into the stomach. Estimated blood loss        was minimal.        The Z-line was regular and was found 39 cm from the incisors.        A medium amount of food (residue) was found in the gastric antrum.        No gross lesions were noted in the ampulla, in the duodenal bulb, in the        first portion of the duodenum and in the second portion of the duodenum.        The cardia and gastric fundus were normal on retroflexion.                                                                                    Impression:            - Food in the proximal esophagus.                          - Z-line regular, 39 cm from the incisors.                          - A medium amount of food (residue) in the stomach.                          - No gross lesions in the ampulla, in the duodenal                          bulb, in the first portion of the duodenum and in the                          second portion of the duodenum.                          - No specimens collected.   Recommendation:        - Discharge patient to home (ambulatory).                          - Clear liquid diet today.                          - Continue present medications.                          - Return to primary care physician in 1 week.                                                                                      _________________   GENO CAMEJO,   5/7/2024 9:41:45 PM   I was physically present for the entire viewing portion of the exam.   [id number] CAMEJO   Number of Addenda: 0

## 2024-05-08 NOTE — ANESTHESIA POSTPROCEDURE EVALUATION
Patient: Milly Loaiza    Procedure: Procedure(s):  ESOPHAGOGASTRODUODENOSCOPY, WITH FOREIGN BODY REMOVAL       Anesthesia Type:  General    Note:  Disposition: Outpatient   Postop Pain Control: Uneventful            Sign Out: Well controlled pain   PONV: No   Neuro/Psych: Uneventful            Sign Out: Acceptable/Baseline neuro status   Airway/Respiratory: Uneventful            Sign Out: Acceptable/Baseline resp. status   CV/Hemodynamics: Uneventful            Sign Out: Acceptable CV status; No obvious hypovolemia; No obvious fluid overload   Other NRE: NONE   DID A NON-ROUTINE EVENT OCCUR? No           Last vitals:  Vitals Value Taken Time   /80 05/07/24 2200   Temp     Pulse 88 05/07/24 2205   Resp 12 05/07/24 2205   SpO2 96 % 05/07/24 2205   Vitals shown include unfiled device data.    Electronically Signed By: FABIANA Hernandes CRNA  May 8, 2024  1:33 AM

## 2024-06-06 ENCOUNTER — OFFICE VISIT (OUTPATIENT)
Dept: FAMILY MEDICINE | Facility: CLINIC | Age: 74
End: 2024-06-06
Payer: COMMERCIAL

## 2024-06-06 VITALS
RESPIRATION RATE: 18 BRPM | HEIGHT: 64 IN | TEMPERATURE: 98 F | OXYGEN SATURATION: 95 % | SYSTOLIC BLOOD PRESSURE: 134 MMHG | HEART RATE: 85 BPM | WEIGHT: 158 LBS | DIASTOLIC BLOOD PRESSURE: 82 MMHG | BODY MASS INDEX: 26.98 KG/M2

## 2024-06-06 DIAGNOSIS — I10 HYPERTENSION GOAL BP (BLOOD PRESSURE) < 140/90: ICD-10-CM

## 2024-06-06 DIAGNOSIS — E78.5 HYPERLIPIDEMIA LDL GOAL <70: ICD-10-CM

## 2024-06-06 DIAGNOSIS — I63.81 LACUNAR INFARCTION (H): ICD-10-CM

## 2024-06-06 DIAGNOSIS — Z00.00 MEDICARE ANNUAL WELLNESS VISIT, SUBSEQUENT: Primary | ICD-10-CM

## 2024-06-06 DIAGNOSIS — C50.911 MALIGNANT NEOPLASM OF RIGHT BREAST IN FEMALE, ESTROGEN RECEPTOR NEGATIVE, UNSPECIFIED SITE OF BREAST (H): ICD-10-CM

## 2024-06-06 DIAGNOSIS — R94.6 ABNORMAL FINDING ON THYROID FUNCTION TEST: ICD-10-CM

## 2024-06-06 DIAGNOSIS — Z17.1 MALIGNANT NEOPLASM OF RIGHT BREAST IN FEMALE, ESTROGEN RECEPTOR NEGATIVE, UNSPECIFIED SITE OF BREAST (H): ICD-10-CM

## 2024-06-06 DIAGNOSIS — Z13.6 CARDIOVASCULAR SCREENING; LDL GOAL LESS THAN 160: ICD-10-CM

## 2024-06-06 LAB
ERYTHROCYTE [DISTWIDTH] IN BLOOD BY AUTOMATED COUNT: 13 % (ref 10–15)
HCT VFR BLD AUTO: 39.3 % (ref 35–47)
HGB BLD-MCNC: 13.1 G/DL (ref 11.7–15.7)
MCH RBC QN AUTO: 29.6 PG (ref 26.5–33)
MCHC RBC AUTO-ENTMCNC: 33.3 G/DL (ref 31.5–36.5)
MCV RBC AUTO: 89 FL (ref 78–100)
PLATELET # BLD AUTO: 262 10E3/UL (ref 150–450)
RBC # BLD AUTO: 4.43 10E6/UL (ref 3.8–5.2)
WBC # BLD AUTO: 6.9 10E3/UL (ref 4–11)

## 2024-06-06 PROCEDURE — 84443 ASSAY THYROID STIM HORMONE: CPT | Performed by: FAMILY MEDICINE

## 2024-06-06 PROCEDURE — G0439 PPPS, SUBSEQ VISIT: HCPCS | Performed by: FAMILY MEDICINE

## 2024-06-06 PROCEDURE — 99214 OFFICE O/P EST MOD 30 MIN: CPT | Mod: 25 | Performed by: FAMILY MEDICINE

## 2024-06-06 PROCEDURE — 85027 COMPLETE CBC AUTOMATED: CPT | Performed by: FAMILY MEDICINE

## 2024-06-06 PROCEDURE — 36415 COLL VENOUS BLD VENIPUNCTURE: CPT | Performed by: FAMILY MEDICINE

## 2024-06-06 PROCEDURE — 80061 LIPID PANEL: CPT | Performed by: FAMILY MEDICINE

## 2024-06-06 PROCEDURE — 80053 COMPREHEN METABOLIC PANEL: CPT | Performed by: FAMILY MEDICINE

## 2024-06-06 SDOH — HEALTH STABILITY: PHYSICAL HEALTH: ON AVERAGE, HOW MANY DAYS PER WEEK DO YOU ENGAGE IN MODERATE TO STRENUOUS EXERCISE (LIKE A BRISK WALK)?: 7 DAYS

## 2024-06-06 SDOH — HEALTH STABILITY: PHYSICAL HEALTH: ON AVERAGE, HOW MANY MINUTES DO YOU ENGAGE IN EXERCISE AT THIS LEVEL?: 60 MIN

## 2024-06-06 ASSESSMENT — PAIN SCALES - GENERAL: PAINLEVEL: NO PAIN (0)

## 2024-06-06 ASSESSMENT — SOCIAL DETERMINANTS OF HEALTH (SDOH): HOW OFTEN DO YOU GET TOGETHER WITH FRIENDS OR RELATIVES?: ONCE A WEEK

## 2024-06-06 NOTE — LETTER
Moon 10, 2024      Milly M Josse  [id number] [facility] [id number]        Milly,     Please see the enclosed copy of urine recent tests.  Your blood test show that your cholesterol is up.  Given the fact that you have had a stroke, I would recommend taking a cholesterol-lowering medication.  Will send a prescription for rosuvastatin, it is taken daily, and helps lower cholesterol and reduce your risk of having a heart attack or stroke.     Also, you have normal thyroid, kidney, and liver function.  There is no signs of anemia or diabetes.     There is nothing here to explain any unusual fatigue.     I would recommend a follow-up appointment in 6 months.  Please call, or use [employer], with any questions or concerns.     Resulted Orders   Lipid panel reflex to direct LDL Non-fasting   Result Value Ref Range    Cholesterol 252 (H) <200 mg/dL    Triglycerides 229 (H) <150 mg/dL    Direct Measure HDL 52 >=50 mg/dL    LDL Cholesterol Calculated 154 (H) <=100 mg/dL    Non HDL Cholesterol 200 (H) <130 mg/dL    Patient Fasting > 8hrs? No     Narrative    Cholesterol  Desirable:  <200 mg/dL    Triglycerides  Normal:  Less than 150 mg/dL  Borderline High:  150-199 mg/dL  High:  200-499 mg/dL  Very High:  Greater than or equal to 500 mg/dL    Direct Measure HDL  Female:  Greater than or equal to 50 mg/dL   Male:  Greater than or equal to 40 mg/dL    LDL Cholesterol  Desirable:  <100mg/dL  Above Desirable:  100-129 mg/dL   Borderline High:  130-159 mg/dL   High:  160-189 mg/dL   Very High:  >= 190 mg/dL    Non HDL Cholesterol  Desirable:  130 mg/dL  Above Desirable:  130-159 mg/dL  Borderline High:  160-189 mg/dL  High:  190-219 mg/dL  Very High:  Greater than or equal to 220 mg/dL   Comprehensive metabolic panel (BMP + Alb, Alk Phos, ALT, AST, Total. Bili, TP)   Result Value Ref Range    Sodium 143 135 - 145 mmol/L      Comment:      Reference intervals for this test were updated on 09/26/2023 to more accurately  reflect our healthy population. There may be differences in the flagging of prior results with similar values performed with this method. Interpretation of those prior results can be made in the context of the updated reference intervals.     Potassium 4.3 3.4 - 5.3 mmol/L    Carbon Dioxide (CO2) 26 22 - 29 mmol/L    Anion Gap 12 7 - 15 mmol/L    Urea Nitrogen 11.6 8.0 - 23.0 mg/dL    Creatinine 0.94 0.51 - 0.95 mg/dL    GFR Estimate 63 >60 mL/min/1.73m2    Calcium 9.5 8.8 - 10.2 mg/dL    Chloride 105 98 - 107 mmol/L    Glucose 98 70 - 99 mg/dL    Alkaline Phosphatase 110 40 - 150 U/L    AST 26 0 - 45 U/L      Comment:      Reference intervals for this test were updated on 6/12/2023 to more accurately reflect our healthy population. There may be differences in the flagging of prior results with similar values performed with this method. Interpretation of those prior results can be made in the context of the updated reference intervals.    ALT 23 0 - 50 U/L      Comment:      Reference intervals for this test were updated on 6/12/2023 to more accurately reflect our healthy population. There may be differences in the flagging of prior results with similar values performed with this method. Interpretation of those prior results can be made in the context of the updated reference intervals.      Protein Total 7.3 6.4 - 8.3 g/dL    Albumin 4.6 3.5 - 5.2 g/dL    Bilirubin Total 0.2 <=1.2 mg/dL    Patient Fasting > 8hrs? No    CBC with platelets   Result Value Ref Range    WBC Count 6.9 4.0 - 11.0 10e3/uL    RBC Count 4.43 3.80 - 5.20 10e6/uL    Hemoglobin 13.1 11.7 - 15.7 g/dL    Hematocrit 39.3 35.0 - 47.0 %    MCV 89 78 - 100 fL    MCH 29.6 26.5 - 33.0 pg    MCHC 33.3 31.5 - 36.5 g/dL    RDW 13.0 10.0 - 15.0 %    Platelet Count 262 150 - 450 10e3/uL   TSH with free T4 reflex   Result Value Ref Range    TSH 3.40 0.30 - 4.20 uIU/mL       If you have any questions or concerns, please call the clinic at the number listed  above.       Sincerely,  Nhung Navarro MD

## 2024-06-06 NOTE — PROGRESS NOTES
Preventive Care Visit  [facility] TITO Navarro MD, Family Medicine  Jun 6, 2024      Assessment & Plan     (Z00.00) Medicare annual wellness visit, subsequent  (primary encounter diagnosis)  Comment:  Reviewed the need for periodic healthcare exams and screenings.   Plan: REVIEW OF HEALTH MAINTENANCE PROTOCOL ORDERS        Advised yearly check ups.     (I10) Hypertension goal BP (blood pressure) < 140/90  Comment: repeat blood pressure check within guidelines. Currently on single drug therapy: ace inhibitor   Plan: Comprehensive metabolic panel (BMP + Alb, Alk         Phos, ALT, AST, Total. Bili, TP), CBC with         platelets        Continue. Await test results     (I63.81) Lacunar infarction (H)  Comment: no residual symptoms.   Plan: continue managing blood pressure, daily high dose ASA. Will await cholesterol testing.     (C50.911,  Z17.1) Malignant neoplasm of right breast in female, estrogen receptor negative, unspecified site of breast (H)  Comment: no reoccurrence.   Plan: Comprehensive metabolic panel (BMP + Alb, Alk         Phos, ALT, AST, Total. Bili, TP)        Will check liver function testing. Continue annual follow up with oncology.     (R94.6) Abnormal finding on thyroid function test  Plan: TSH with free T4 reflex        Await test results     (Z13.6) CARDIOVASCULAR SCREENING; LDL GOAL LESS THAN 160  Plan: Lipid panel reflex to direct LDL Non-fasting          Results for orders placed or performed in visit on 06/06/24   CBC with platelets     Status: Normal   Result Value Ref Range    WBC Count 6.9 4.0 - 11.0 10e3/uL    RBC Count 4.43 3.80 - 5.20 10e6/uL    Hemoglobin 13.1 11.7 - 15.7 g/dL    Hematocrit 39.3 35.0 - 47.0 %    MCV 89 78 - 100 fL    MCH 29.6 26.5 - 33.0 pg    MCHC 33.3 31.5 - 36.5 g/dL    RDW 13.0 10.0 - 15.0 %    Platelet Count 262 150 - 450 10e3/uL        There are no Patient Instructions on file for this visit.     Patient has been advised of split  "billing requirements and indicates understanding: Yes        BMI  Estimated body mass index is 27.12 kg/m  as calculated from the following:    Height as of this encounter: 1.626 m (5' 4\").    Weight as of this encounter: 71.7 kg (158 lb).       Counseling  Appropriate preventive services were discussed with this patient, including applicable screening as appropriate for fall prevention, nutrition, physical activity, Tobacco-use cessation, weight loss and cognition.  Checklist reviewing preventive services available has been given to the patient.  Reviewed patient's diet, addressing concerns and/or questions.   The patient was instructed to see the dentist every 6 months.   She is at risk for psychosocial distress and has been provided with information to reduce risk.   Discussed possible causes of fatigue. The patient was provided with written information regarding signs of hearing loss.       Work on weight loss  Regular exercise    Shakeel Atkins is a 73 year old, presenting for the following:  Medicare Visit        6/6/2024     3:50 PM   Additional Questions   Roomed by Laura   Accompanied by self         Health Care Directive  Patient does not have a Health Care Directive or Living Will: Discussed advance care planning with patient; information given to patient to review.    HPI          6/6/2024   General Health   How would you rate your overall physical health? Good   Feel stress (tense, anxious, or unable to sleep) Only a little   (!) STRESS CONCERN      6/6/2024   Nutrition   Diet: Regular (no restrictions)         6/6/2024   Exercise   Days per week of moderate/strenous exercise 7 days   Average minutes spent exercising at this level 60 min         6/6/2024   Social Factors   Frequency of gathering with friends or relatives Once a week   Worry food won't last until get money to buy more No   Food not last or not have enough money for food? No   Do you have housing?  Yes   Are you worried about losing " your housing? No   Lack of transportation? No   Unable to get utilities (heat,electricity)? No         6/6/2024   Fall Risk   Fallen 2 or more times in the past year? No   Trouble with walking or balance? No          6/6/2024   Activities of Daily Living- Home Safety   Needs help with the following daily activites None of the above   Safety concerns in the home None of the above         6/6/2024   Dental   Dentist two times every year? (!) NO         6/6/2024   Hearing Screening   Hearing concerns? (!) IT'S HARD TO FOLLOW A CONVERSATION IN A NOISY RESTAURANT OR CROWDED ROOM.         6/6/2024   Driving Risk Screening   Patient/family members have concerns about driving No         6/6/2024   General Alertness/Fatigue Screening   Have you been more tired than usual lately? (!) YES         6/6/2024   Urinary Incontinence Screening   Bothered by leaking urine in past 6 months No         6/6/2024   TB Screening   Were you born outside of the [place]? No         Today's PHQ-2 Score:       6/6/2024     3:55 PM   PHQ-2 ( 1999 [employer])   Q1: Little interest or pleasure in doing things 0   Q2: Feeling down, depressed or hopeless 0   PHQ-2 Score 0           6/6/2024   Substance Use   Alcohol more than 3/day or more than 7/wk No   Do you have a current opioid prescription? No   How severe/bad is pain from 1 to 10? 0/10 (No Pain)   Do you use any other substances recreationally? No     Social History     Tobacco Use    Smoking status: Never     Passive exposure: Never    Smokeless tobacco: Never   Vaping Use    Vaping status: Never Used   Substance Use Topics    Alcohol use: Yes     Comment: occ.    Drug use: No           10/31/2023   LAST FHS-7 RESULTS   1st degree relative breast or ovarian cancer No   Any relative bilateral breast cancer Unknown   Any male have breast cancer No   Any ONE woman have BOTH breast AND ovarian cancer No   Any woman with breast cancer before 50yrs No   2 or more relatives with breast AND/OR ovarian cancer  No   2 or more relatives with breast AND/OR bowel cancer No        Mammogram Screening - After age 74- determine frequency with patient based on health status, life expectancy and patient goals    ASCVD Risk   The ASCVD Risk score (Kenton JUDD, et al., 2019) failed to calculate for the following reasons:    Cannot find a previous HDL lab    Cannot find a previous total cholesterol lab            Reviewed and updated as needed this visit by Provider                    Labs reviewed in EPIC  Current providers sharing in care for this patient include:  Patient Care Team:  Nhung Navarro MD as PCP - Kindra Yoon, RN as Specialty Care Coordinator (Oncology)  Evert Segura MD as MD (Radiation Oncology)  Cinda Bee APRN CNP as Nurse Practitioner (Nurse Practitioner)  Nhung Navarro MD as Assigned PCP  Uma Hollins NP as Assigned Cancer Care Provider    The following health maintenance items are reviewed in Epic and correct as of today:  Health Maintenance   Topic Date Due    DEXA  Never done    COLORECTAL CANCER SCREENING  Never done    HEPATITIS C SCREENING  Never done    DTAP/TDAP/TD IMMUNIZATION (1 - Tdap) Never done    LIPID  Never done    ZOSTER IMMUNIZATION (1 of 2) Never done    RSV VACCINE (Pregnancy & 60+) (1 - 1-dose 60+ series) Never done    MEDICARE ANNUAL WELLNESS VISIT  Never done    Pneumococcal Vaccine: 65+ Years (1 of 1 - PCV) Never done    ADVANCE CARE PLANNING  04/05/2022    ANNUAL REVIEW OF HM ORDERS  06/01/2022    COVID-19 Vaccine (1 - 2023-24 season) Never done    INFLUENZA VACCINE (Season Ended) 09/01/2024    MAMMO SCREENING  10/31/2024    FALL RISK ASSESSMENT  06/06/2025    GLUCOSE  06/22/2026    PHQ-2 (once per calendar year)  Completed    IPV IMMUNIZATION  Aged Out    HPV IMMUNIZATION  Aged Out    MENINGITIS IMMUNIZATION  Aged Out    RSV MONOCLONAL ANTIBODY  Aged Out         Review of Systems  CONSTITUTIONAL: NEGATIVE for fever, chills, change in weight. Positive  "for fatigue.   ENT/MOUTH: NEGATIVE for ear, mouth and throat problems  RESP: NEGATIVE for significant cough or SOB  CV: NEGATIVE for chest pain, palpitations or peripheral edema  GI: NEGATIVE for nausea, abdominal pain, heartburn, or change in bowel habits  MUSCULOSKELETAL: NEGATIVE for significant arthralgias or myalgia     Objective    Exam  BP (!) 162/84   Pulse 85   Temp 98  F (36.7  C) (Temporal)   Resp 18   Ht 1.626 m (5' 4\")   Wt 71.7 kg (158 lb)   LMP  (LMP Unknown)   SpO2 95%   BMI 27.12 kg/m     Estimated body mass index is 27.12 kg/m  as calculated from the following:    Height as of this encounter: 1.626 m (5' 4\").    Weight as of this encounter: 71.7 kg (158 lb).    Physical Exam  GENERAL: Healthy, alert and no distress  EYES: Eyes grossly normal to inspection, conjunctivae and sclerae normal  RESP: Lungs clear to auscultation - no rales, rhonchi or wheezes  CV: Regular rate and rhythm, normal S1 S2, no murmur  MS: No gross musculoskeletal defects noted, no edema  NEURO: Normal strength and tone, mentation intact and speech normal  PSYCH: Mentation appears normal, affect normal/bright         6/6/2024   Mini Cog   Clock Draw Score 2 Normal   3 Item Recall 1 object recalled   Mini Cog Total Score 3              Signed Electronically by: Nhung Navarro MD    "

## 2024-06-07 LAB
ALBUMIN SERPL BCG-MCNC: 4.6 G/DL (ref 3.5–5.2)
ALP SERPL-CCNC: 110 U/L (ref 40–150)
ALT SERPL W P-5'-P-CCNC: 23 U/L (ref 0–50)
ANION GAP SERPL CALCULATED.3IONS-SCNC: 12 MMOL/L (ref 7–15)
AST SERPL W P-5'-P-CCNC: 26 U/L (ref 0–45)
BILIRUB SERPL-MCNC: 0.2 MG/DL
BUN SERPL-MCNC: 11.6 MG/DL (ref 8–23)
CALCIUM SERPL-MCNC: 9.5 MG/DL (ref 8.8–10.2)
CHLORIDE SERPL-SCNC: 105 MMOL/L (ref 98–107)
CHOLEST SERPL-MCNC: 252 MG/DL
CREAT SERPL-MCNC: 0.94 MG/DL (ref 0.51–0.95)
DEPRECATED HCO3 PLAS-SCNC: 26 MMOL/L (ref 22–29)
EGFRCR SERPLBLD CKD-EPI 2021: 63 ML/MIN/1.73M2
FASTING STATUS PATIENT QL REPORTED: NO
FASTING STATUS PATIENT QL REPORTED: NO
GLUCOSE SERPL-MCNC: 98 MG/DL (ref 70–99)
HDLC SERPL-MCNC: 52 MG/DL
LDLC SERPL CALC-MCNC: 154 MG/DL
NONHDLC SERPL-MCNC: 200 MG/DL
POTASSIUM SERPL-SCNC: 4.3 MMOL/L (ref 3.4–5.3)
PROT SERPL-MCNC: 7.3 G/DL (ref 6.4–8.3)
SODIUM SERPL-SCNC: 143 MMOL/L (ref 135–145)
TRIGL SERPL-MCNC: 229 MG/DL
TSH SERPL DL<=0.005 MIU/L-ACNC: 3.4 UIU/ML (ref 0.3–4.2)

## 2024-06-08 RX ORDER — ROSUVASTATIN CALCIUM 5 MG/1
5 TABLET, COATED ORAL DAILY
Qty: 90 TABLET | Refills: 3 | Status: SHIPPED | OUTPATIENT
Start: 2024-06-08

## 2024-06-17 PROBLEM — Z71.89 ADVANCED DIRECTIVES, COUNSELING/DISCUSSION: Status: RESOLVED | Noted: 2017-04-05 | Resolved: 2024-06-17

## 2024-07-22 ENCOUNTER — ONCOLOGY VISIT (OUTPATIENT)
Dept: ONCOLOGY | Facility: CLINIC | Age: 74
End: 2024-07-22
Attending: INTERNAL MEDICINE
Payer: COMMERCIAL

## 2024-07-22 VITALS
WEIGHT: 157.2 LBS | HEART RATE: 83 BPM | HEIGHT: 64 IN | TEMPERATURE: 98.1 F | SYSTOLIC BLOOD PRESSURE: 164 MMHG | DIASTOLIC BLOOD PRESSURE: 85 MMHG | BODY MASS INDEX: 26.84 KG/M2 | OXYGEN SATURATION: 98 %

## 2024-07-22 DIAGNOSIS — D05.11 DUCTAL CARCINOMA IN SITU (DCIS) OF RIGHT BREAST: ICD-10-CM

## 2024-07-22 DIAGNOSIS — C50.911 INVASIVE DUCTAL CARCINOMA OF BREAST, RIGHT (H): Primary | ICD-10-CM

## 2024-07-22 DIAGNOSIS — Z12.31 ENCOUNTER FOR SCREENING MAMMOGRAM FOR BREAST CANCER: ICD-10-CM

## 2024-07-22 PROCEDURE — 99214 OFFICE O/P EST MOD 30 MIN: CPT | Performed by: NURSE PRACTITIONER

## 2024-07-22 PROCEDURE — G0463 HOSPITAL OUTPT CLINIC VISIT: HCPCS | Performed by: NURSE PRACTITIONER

## 2024-07-22 PROCEDURE — G2211 COMPLEX E/M VISIT ADD ON: HCPCS | Performed by: NURSE PRACTITIONER

## 2024-07-22 ASSESSMENT — PAIN SCALES - GENERAL: PAINLEVEL: NO PAIN (0)

## 2024-07-22 NOTE — PROGRESS NOTES
"Oncology Rooming Note    July 22, 2024 1:53 PM   Milly Loaiza is a 73 year old female who presents for:    Chief Complaint   Patient presents with    Oncology Clinic Visit     Malignant neoplasm of right breast in female - provider only visit     Initial Vitals: BP (!) 164/85 (BP Location: Right arm, Patient Position: Sitting, Cuff Size: Adult Regular)   Pulse 83   Temp 98.1  F (36.7  C) (Tympanic)   Ht 1.626 m (5' 4\")   Wt 71.3 kg (157 lb 3.2 oz)   LMP  (LMP Unknown)   SpO2 98%   BMI 26.98 kg/m   Estimated body mass index is 26.98 kg/m  as calculated from the following:    Height as of this encounter: 1.626 m (5' 4\").    Weight as of this encounter: 71.3 kg (157 lb 3.2 oz). Body surface area is 1.79 meters squared.  No Pain (0) Comment: Data Unavailable   No LMP recorded (lmp unknown). Patient is postmenopausal.  Allergies reviewed: Yes  Medications reviewed: Yes    Medications: Medication refills not needed today.  Pharmacy name entered into [facility]:    [facility] - [place], [place] - [address] [facility] [place], [place] - [address] [facility]    Frailty Screening:   Is the patient here for a new oncology consult visit in cancer care? 2. No      Clinical concerns: None today.       Criselda Johnson MA            "

## 2024-07-22 NOTE — PROGRESS NOTES
[facility] Hematology and Oncology Progress Note    Patient: Milly Loaiza  MRN: [id number]  Date of Service: Jul 22, 2024          Reason for Visit    History of right breast cancer (triple negative) x3    Primary oncologist: Dr. Urena    Assessment and Plan  Recurrent triple negative invasive breast cancer x2 (2004, 2020)  History of DCIS right breast x1 (2009)  Has had recurrent triple negative breast cancer twice now (latest in 2020) and DCIS in 2009.  Underwent surgery and radiation x2.  With her first diagnosis 2004 she did have chemotherapy but did not tolerate this well, so declined repeating chemotherapy with her last diagnosis in 2020.      Clinically, doing well today.   No evidence for recurrent disease on exam.  Mammogram 10/2023 negative    Plan:  -Continue annual screening 3D mammograms every September  -She has preferred annual exams, return in 1 year with Uma (she prefers female provider)  -Contact us sooner with any new symptoms/concerns  -We will follow annually in oncology for at least 5-10 years past her last diagnosis in 2020       Cancer Staging   Invasive ductal carcinoma of breast, right (H)  Staging form: Breast, AJCC 8th Edition  - Pathologic: Stage IIA (pT2, pN0, cM0, G3, ER-, KS-, HER2-) - Signed by Faisal Urena MD on 7/21/2022    Malignant neoplasm of female breast (H)  Staging form: Breast, AJCC 8th Edition  - Pathologic: Stage Unknown (pT1b, pNX, cM0, G3, ER-, KS-, HER2-) - Signed by Faisal Urena MD on 7/21/2022      ECOG Performance    0       Oncology history  2004: Stage Ia (T2-N0-M0) right triple negative invasive ductal carcinoma breast  -Detected on screening mammogram  -Surgical pathology: T2 lesion.  No nodes.  ER negative, KS negative, HER2 negative  -Lumpectomy, adjuvant chemo, and adjuvant radiation     2009: Right breast high-grade DCIS.  Indeterminant ER/KS status (not enough tissue)   -Detected on routine screening mammogram   -Biopsy:  High-grade DCIS with necrosis.  Inadequate tissue for ER/VA studies.    -Lumpectomy surgical pathology: No residual cancer    2020: Stage IA (yT3e-TS) right triple negative invasive ductal carcinoma breast  -Ultrasound biopsy: Grade 3 IDC.  ER negative, VA negative, HER2 FISH equivocal, IHC 1+ (HER2 negative)  -Lumpectomy pathology: 1 cm IDC, grade 2, no LVI.  No lymph nodes were removed due to prior LND surgery  -Lumpectomy and adjuvant radiation.      Genetic testing negative.    Treatment:  First occurrence (invasive triple negative):  2/2004 -Right breast lumpectomy   3/2004 - began ACx 3 cycles and could not tolerate anymore chemotherapy, followed by radiation.    2004 -adjuvant radiation    2nd occurrence (DCIS):  3/2009 - right lumpectomy without residual invasive cancer    3rd occurrence (invasive triple negative):  1/2020-  right lumpectomy  4/2020 - adjuvant RT  --Due to the small size of the lesion, her prior poor tolerance to chemotherapy chemo was not done.    Interval History   Milly Loaiza is a 73 year old female currently on observation who is seen in oncology clinic for 1-yr follow-up for history of extensive right breast cancer recurrences. It's been  4.5 yrs since her last recurrence.     Initially, she had a triple negative right-sided breast cancer resected in 2004.  Received adjuvant AC for 3 cycles and stopped after that due to intolerance.  Received adjuvant radiation.      Then, had a recurrent high-grade DCIS in 2009 and underwent lumpectomy.      Finally and most recently, in 2020 she had a T1b triple negative high-grade ductal carcinoma and underwent surgical resection and adjuvant re- irradiation.  Due to her prior intolerance of chemotherapy, she did not pursue this and has been under surveillance.     No breast concerns. No weight loss. No new sites of pain. Some inner thigh pain after riding her horses.  No unusual headaches.       Physical Exam    General: alert and cooperative.    accompanies.  HEENT: No icterus  Lymph: No palpable cervical nor axillary adenopathy  Breast: Multiple right breast lumpectomy scars noted.  Dense fibrous tissue underneath.  No discrete lumps.  No skin changes.  Left breast is normal.    Chest: Clear to auscultation bilaterally  Cardiac: RRR  Abdomen: abdomen is soft without significant tenderness, masses, organomegaly or guarding  Extremities: atraumatic, no peripheral edema  Skin: no rashes  Neuro: Nonfocal    Lab Results    No results found for this or any previous visit (from the past 168 hour(s)).    Imaging    No results found.    Total time 30 minutes, to include face to face visit, review of EMR, ordering, documentation and coordination of care on date of service.    complexity modifier for longitudinal care.         Signed by: Uma Hollins NP

## 2024-07-22 NOTE — LETTER
"7/22/2024      Milly Loaiza  [address] [facility] [id number]      Dear Colleague,    Thank you for referring your patient, Milly Loaiza, to the [facility]. Please see a copy of my visit note below.    Oncology Rooming Note    July 22, 2024 1:53 PM   Milly Loaiza is a 73 year old female who presents for:    Chief Complaint   Patient presents with     Oncology Clinic Visit     Malignant neoplasm of right breast in female - provider only visit     Initial Vitals: BP (!) 164/85 (BP Location: Right arm, Patient Position: Sitting, Cuff Size: Adult Regular)   Pulse 83   Temp 98.1  F (36.7  C) (Tympanic)   Ht 1.626 m (5' 4\")   Wt 71.3 kg (157 lb 3.2 oz)   LMP  (LMP Unknown)   SpO2 98%   BMI 26.98 kg/m   Estimated body mass index is 26.98 kg/m  as calculated from the following:    Height as of this encounter: 1.626 m (5' 4\").    Weight as of this encounter: 71.3 kg (157 lb 3.2 oz). Body surface area is 1.79 meters squared.  No Pain (0) Comment: Data Unavailable   No LMP recorded (lmp unknown). Patient is postmenopausal.  Allergies reviewed: Yes  Medications reviewed: Yes    Medications: Medication refills not needed today.  Pharmacy name entered into [employer]:    [facility] [place], [place] - [address] [facility] [place], [place] - [address] [facility]    Frailty Screening:   Is the patient here for a new oncology consult visit in cancer care? 2. No      Clinical concerns: None today.       Criselda Johnson MA              [facility] Hematology and Oncology Progress Note    Patient: Milly Loaiza  MRN: [id number]  Date of Service: Jul 22, 2024          Reason for Visit    History of right breast cancer (triple negative) x3    Primary oncologist: Dr. Urena    Assessment and Plan  Recurrent triple negative invasive breast cancer x2 (2004, 2020)  History of DCIS right breast x1 (2009)  Has had recurrent triple negative " breast cancer twice now (latest in 2020) and DCIS in 2009.  Underwent surgery and radiation x2.  With her first diagnosis 2004 she did have chemotherapy but did not tolerate this well, so declined repeating chemotherapy with her last diagnosis in 2020.      Clinically, doing well today.   No evidence for recurrent disease on exam.  Mammogram 10/2023 negative    Plan:  -Continue annual screening 3D mammograms every September  -She has preferred annual exams, return in 1 year with Uma (she prefers female provider)  -Contact us sooner with any new symptoms/concerns  -We will follow annually in oncology for at least 5-10 years past her last diagnosis in 2020       Cancer Staging   Invasive ductal carcinoma of breast, right (H)  Staging form: Breast, AJCC 8th Edition  - Pathologic: Stage IIA (pT2, pN0, cM0, G3, ER-, NV-, HER2-) - Signed by Faisal Urena MD on 7/21/2022    Malignant neoplasm of female breast (H)  Staging form: Breast, AJCC 8th Edition  - Pathologic: Stage Unknown (pT1b, pNX, cM0, G3, ER-, NV-, HER2-) - Signed by Faisal Urena MD on 7/21/2022      ECOG Performance    0       Oncology history  2004: Stage Ia (T2-N0-M0) right triple negative invasive ductal carcinoma breast  -Detected on screening mammogram  -Surgical pathology: T2 lesion.  No nodes.  ER negative, NV negative, HER2 negative  -Lumpectomy, adjuvant chemo, and adjuvant radiation     2009: Right breast high-grade DCIS.  Indeterminant ER/NV status (not enough tissue)   -Detected on routine screening mammogram   -Biopsy: High-grade DCIS with necrosis.  Inadequate tissue for ER/NV studies.    -Lumpectomy surgical pathology: No residual cancer    2020: Stage IA (eJ5g-CP) right triple negative invasive ductal carcinoma breast  -Ultrasound biopsy: Grade 3 IDC.  ER negative, NV negative, HER2 FISH equivocal, IHC 1+ (HER2 negative)  -Lumpectomy pathology: 1 cm IDC, grade 2, no LVI.  No lymph nodes were removed due to prior LND  surgery  -Lumpectomy and adjuvant radiation.      Genetic testing negative.    Treatment:  First occurrence (invasive triple negative):  2/2004 -Right breast lumpectomy   3/2004 - began ACx 3 cycles and could not tolerate anymore chemotherapy, followed by radiation.    2004 -adjuvant radiation    2nd occurrence (DCIS):  3/2009 - right lumpectomy without residual invasive cancer    3rd occurrence (invasive triple negative):  1/2020-  right lumpectomy  4/2020 - adjuvant RT  --Due to the small size of the lesion, her prior poor tolerance to chemotherapy chemo was not done.    Interval History   Milly Loaiza is a 73 year old female currently on observation who is seen in oncology clinic for 1-yr follow-up for history of extensive right breast cancer recurrences. It's been  4.5 yrs since her last recurrence.     Initially, she had a triple negative right-sided breast cancer resected in 2004.  Received adjuvant AC for 3 cycles and stopped after that due to intolerance.  Received adjuvant radiation.      Then, had a recurrent high-grade DCIS in 2009 and underwent lumpectomy.      Finally and most recently, in 2020 she had a T1b triple negative high-grade ductal carcinoma and underwent surgical resection and adjuvant re- irradiation.  Due to her prior intolerance of chemotherapy, she did not pursue this and has been under surveillance.     No breast concerns. No weight loss. No new sites of pain. Some inner thigh pain after riding her horses.  No unusual headaches.       Physical Exam    General: alert and cooperative.   accompanies.  HEENT: No icterus  Lymph: No palpable cervical nor axillary adenopathy  Breast: Multiple right breast lumpectomy scars noted.  Dense fibrous tissue underneath.  No discrete lumps.  No skin changes.  Left breast is normal.    Chest: Clear to auscultation bilaterally  Cardiac: RRR  Abdomen: abdomen is soft without significant tenderness, masses, organomegaly or guarding  Extremities:  atraumatic, no peripheral edema  Skin: no rashes  Neuro: Nonfocal    Lab Results    No results found for this or any previous visit (from the past 168 hour(s)).    Imaging    No results found.    Total time 30 minutes, to include face to face visit, review of EMR, ordering, documentation and coordination of care on date of service.    complexity modifier for longitudinal care.         Signed by: Uma Hollins NP      Again, thank you for allowing me to participate in the care of your patient.        Sincerely,        Uma Hollins NP

## 2024-08-02 DIAGNOSIS — I10 HYPERTENSION GOAL BP (BLOOD PRESSURE) < 140/90: ICD-10-CM

## 2024-08-02 RX ORDER — LISINOPRIL 20 MG/1
20 TABLET ORAL DAILY
Qty: 90 TABLET | Refills: 1 | Status: SHIPPED | OUTPATIENT
Start: 2024-08-02

## 2024-08-12 ENCOUNTER — PATIENT OUTREACH (OUTPATIENT)
Dept: ONCOLOGY | Facility: CLINIC | Age: 74
End: 2024-08-12
Payer: COMMERCIAL

## 2024-08-12 NOTE — PROGRESS NOTES
[facility]: Cancer Care                                                                                          Enrollment status: maintenance  Follow up scheduled: 7/21/25    Signature:  TANYA BRITTON RN

## 2024-11-01 ENCOUNTER — HOSPITAL ENCOUNTER (OUTPATIENT)
Dept: MAMMOGRAPHY | Facility: CLINIC | Age: 74
Discharge: HOME | End: 2024-11-01
Attending: NURSE PRACTITIONER | Admitting: NURSE PRACTITIONER
Payer: COMMERCIAL

## 2024-11-01 DIAGNOSIS — Z12.31 ENCOUNTER FOR SCREENING MAMMOGRAM FOR BREAST CANCER: ICD-10-CM

## 2024-11-01 PROCEDURE — 77063 BREAST TOMOSYNTHESIS BI: CPT

## 2024-12-06 ENCOUNTER — APPOINTMENT (OUTPATIENT)
Dept: GENERAL RADIOLOGY | Facility: CLINIC | Age: 74
End: 2024-12-06
Attending: EMERGENCY MEDICINE
Payer: COMMERCIAL

## 2024-12-06 ENCOUNTER — HOSPITAL ENCOUNTER (EMERGENCY)
Facility: CLINIC | Age: 74
Discharge: HOME | End: 2024-12-06
Attending: EMERGENCY MEDICINE | Admitting: EMERGENCY MEDICINE
Payer: COMMERCIAL

## 2024-12-06 VITALS
SYSTOLIC BLOOD PRESSURE: 177 MMHG | HEIGHT: 64 IN | WEIGHT: 150 LBS | DIASTOLIC BLOOD PRESSURE: 88 MMHG | HEART RATE: 81 BPM | TEMPERATURE: 98.4 F | OXYGEN SATURATION: 96 % | BODY MASS INDEX: 25.61 KG/M2 | RESPIRATION RATE: 20 BRPM

## 2024-12-06 DIAGNOSIS — R07.9 CHEST PAIN, UNSPECIFIED TYPE: ICD-10-CM

## 2024-12-06 LAB
ALBUMIN SERPL BCG-MCNC: 4.3 G/DL (ref 3.5–5.2)
ALP SERPL-CCNC: 129 U/L (ref 40–150)
ALT SERPL W P-5'-P-CCNC: 23 U/L (ref 0–50)
ANION GAP SERPL CALCULATED.3IONS-SCNC: 13 MMOL/L (ref 7–15)
AST SERPL W P-5'-P-CCNC: 28 U/L (ref 0–45)
BASOPHILS # BLD AUTO: 0.1 10E3/UL (ref 0–0.2)
BASOPHILS NFR BLD AUTO: 2 %
BILIRUB SERPL-MCNC: 0.2 MG/DL
BUN SERPL-MCNC: 14.8 MG/DL (ref 8–23)
CALCIUM SERPL-MCNC: 9.3 MG/DL (ref 8.8–10.4)
CHLORIDE SERPL-SCNC: 103 MMOL/L (ref 98–107)
CREAT SERPL-MCNC: 0.97 MG/DL (ref 0.51–0.95)
EGFRCR SERPLBLD CKD-EPI 2021: 60 ML/MIN/1.73M2
EOSINOPHIL # BLD AUTO: 0.4 10E3/UL (ref 0–0.7)
EOSINOPHIL NFR BLD AUTO: 5 %
ERYTHROCYTE [DISTWIDTH] IN BLOOD BY AUTOMATED COUNT: 12.5 % (ref 10–15)
GLUCOSE SERPL-MCNC: 124 MG/DL (ref 70–99)
HCO3 SERPL-SCNC: 25 MMOL/L (ref 22–29)
HCT VFR BLD AUTO: 37.2 % (ref 35–47)
HGB BLD-MCNC: 12.6 G/DL (ref 11.7–15.7)
HOLD SPECIMEN: NORMAL
IMM GRANULOCYTES # BLD: 0 10E3/UL
IMM GRANULOCYTES NFR BLD: 0 %
LYMPHOCYTES # BLD AUTO: 2 10E3/UL (ref 0.8–5.3)
LYMPHOCYTES NFR BLD AUTO: 28 %
MCH RBC QN AUTO: 29.9 PG (ref 26.5–33)
MCHC RBC AUTO-ENTMCNC: 33.9 G/DL (ref 31.5–36.5)
MCV RBC AUTO: 88 FL (ref 78–100)
MONOCYTES # BLD AUTO: 0.7 10E3/UL (ref 0–1.3)
MONOCYTES NFR BLD AUTO: 10 %
NEUTROPHILS # BLD AUTO: 4.1 10E3/UL (ref 1.6–8.3)
NEUTROPHILS NFR BLD AUTO: 56 %
NRBC # BLD AUTO: 0 10E3/UL
NRBC BLD AUTO-RTO: 0 /100
PLATELET # BLD AUTO: 264 10E3/UL (ref 150–450)
POTASSIUM SERPL-SCNC: 3.5 MMOL/L (ref 3.4–5.3)
PROT SERPL-MCNC: 7.3 G/DL (ref 6.4–8.3)
RBC # BLD AUTO: 4.21 10E6/UL (ref 3.8–5.2)
SODIUM SERPL-SCNC: 141 MMOL/L (ref 135–145)
TROPONIN T SERPL HS-MCNC: <6 NG/L
WBC # BLD AUTO: 7.3 10E3/UL (ref 4–11)

## 2024-12-06 PROCEDURE — 84155 ASSAY OF PROTEIN SERUM: CPT | Performed by: EMERGENCY MEDICINE

## 2024-12-06 PROCEDURE — 84520 ASSAY OF UREA NITROGEN: CPT | Performed by: EMERGENCY MEDICINE

## 2024-12-06 PROCEDURE — 250N000011 HC RX IP 250 OP 636: Performed by: EMERGENCY MEDICINE

## 2024-12-06 PROCEDURE — 84484 ASSAY OF TROPONIN QUANT: CPT | Performed by: EMERGENCY MEDICINE

## 2024-12-06 PROCEDURE — 93005 ELECTROCARDIOGRAM TRACING: CPT | Performed by: EMERGENCY MEDICINE

## 2024-12-06 PROCEDURE — 84075 ASSAY ALKALINE PHOSPHATASE: CPT | Performed by: EMERGENCY MEDICINE

## 2024-12-06 PROCEDURE — 93010 ELECTROCARDIOGRAM REPORT: CPT | Performed by: EMERGENCY MEDICINE

## 2024-12-06 PROCEDURE — 85025 COMPLETE CBC W/AUTO DIFF WBC: CPT | Performed by: EMERGENCY MEDICINE

## 2024-12-06 PROCEDURE — 99284 EMERGENCY DEPT VISIT MOD MDM: CPT | Mod: 25 | Performed by: EMERGENCY MEDICINE

## 2024-12-06 PROCEDURE — 99285 EMERGENCY DEPT VISIT HI MDM: CPT | Mod: 25 | Performed by: EMERGENCY MEDICINE

## 2024-12-06 PROCEDURE — 96374 THER/PROPH/DIAG INJ IV PUSH: CPT | Performed by: EMERGENCY MEDICINE

## 2024-12-06 PROCEDURE — 36415 COLL VENOUS BLD VENIPUNCTURE: CPT | Performed by: EMERGENCY MEDICINE

## 2024-12-06 PROCEDURE — 71046 X-RAY EXAM CHEST 2 VIEWS: CPT

## 2024-12-06 RX ORDER — KETOROLAC TROMETHAMINE 15 MG/ML
15 INJECTION, SOLUTION INTRAMUSCULAR; INTRAVENOUS ONCE
Status: COMPLETED | OUTPATIENT
Start: 2024-12-06 | End: 2024-12-06

## 2024-12-06 RX ADMIN — KETOROLAC TROMETHAMINE 15 MG: 15 INJECTION, SOLUTION INTRAMUSCULAR; INTRAVENOUS at 22:36

## 2024-12-06 ASSESSMENT — ENCOUNTER SYMPTOMS
PALPITATIONS: 0
APPETITE CHANGE: 0
HEADACHES: 0
FEVER: 0
ABDOMINAL PAIN: 0
CHEST TIGHTNESS: 0
NAUSEA: 0
FATIGUE: 0
MYALGIAS: 0
WOUND: 0
NUMBNESS: 0
SHORTNESS OF BREATH: 0
CHILLS: 0
COUGH: 0
LIGHT-HEADEDNESS: 0

## 2024-12-06 ASSESSMENT — ACTIVITIES OF DAILY LIVING (ADL)
ADLS_ACUITY_SCORE: 41
ADLS_ACUITY_SCORE: 41

## 2024-12-07 LAB
ATRIAL RATE - MUSE: 104 BPM
DIASTOLIC BLOOD PRESSURE - MUSE: NORMAL MMHG
INTERPRETATION ECG - MUSE: NORMAL
P AXIS - MUSE: 60 DEGREES
PR INTERVAL - MUSE: 160 MS
QRS DURATION - MUSE: 80 MS
QT - MUSE: 336 MS
QTC - MUSE: 441 MS
R AXIS - MUSE: 46 DEGREES
SYSTOLIC BLOOD PRESSURE - MUSE: NORMAL MMHG
T AXIS - MUSE: 55 DEGREES
VENTRICULAR RATE- MUSE: 104 BPM

## 2024-12-07 NOTE — ED PROVIDER NOTES
History     Chief Complaint   Patient presents with    Chest Pain     HPI  Milly Loaiza is a 74 year old female with history of hypertension, TIAs, and malignant breast cancer in remission presenting for evaluation of episodes of chest pain.  Symptoms began yesterday with intermittent episodes of sharp chest pain in her midsternal area.  Symptoms last about 30 minutes and resolved.  She has had 3-4 episodes daily yesterday and today.  No associated diaphoresis, nausea, or dyspnea.  Symptoms not associated with eating or drinking.  Denies any symptoms with exertion.  Symptoms come on at random without clear cause.  In the ED patient reports being currently asymptomatic.    Allergies:  Allergies   Allergen Reactions    Dust Mites Other (See Comments)     Dust     Coconut Fatty Acid Itching and Rash     Purple blotches    Penicillins Rash    Sulfa Antibiotics Rash     Purple blotches       Problem List:    Patient Active Problem List    Diagnosis Date Noted    Need for hepatitis C screening test 06/23/2022     Priority: Medium    Invasive ductal carcinoma of breast, right (H) 12/02/2019     Priority: Medium     Added automatically from request for surgery [id number]      Hypertension goal BP (blood pressure) < 140/90 10/09/2019     Priority: Medium    Lacunar infarction (H) 03/31/2019     Priority: Medium    TIA (transient ischemic attack) 03/29/2017     Priority: Medium    CARDIOVASCULAR SCREENING; LDL GOAL LESS THAN 160 10/31/2010     Priority: Medium    DCIS (ductal carcinoma in situ) 03/27/2009     Priority: Medium    Malignant neoplasm of female breast (H) 05/27/2005     Priority: Medium     Problem list name updated by automated process. Provider to review          Past Medical History:    Past Medical History:   Diagnosis Date    Acute reaction to stress 10/9/2019    Breast cancer (H)     Malignant neoplasm of breast (female), unspecified site        Past Surgical History:    Past Surgical History:    Procedure Laterality Date    BIOPSY BREAST      ESOPHAGOSCOPY, GASTROSCOPY, DUODENOSCOPY (EGD), COMBINED N/A 5/7/2024    Procedure: ESOPHAGOGASTRODUODENOSCOPY, WITH FOREIGN BODY REMOVAL;  Surgeon: Luisito Ball MD;  Location: [facility] OR    LUMPECTOMY BREAST Right 1/27/2020    Procedure: Right Wire Localized Breast Lumpectomy(wire placement at 8:30);  Surgeon: Gallo King DO;  Location: [facility] OR    SURGICAL HISTORY OF -   2003    Cervical biopsy    SURGICAL HISTORY OF -   1979    Tubal ligation    SURGICAL HISTORY OF -   2/2/2004    Right mastectomy    SURGICAL HISTORY OF -   3-23-09    Needle-guided left breast biopsy.       Family History:    Family History   Problem Relation Age of Onset    Alcohol/Drug Mother     Respiratory Mother         emphazyma    Cerebrovascular Disease Maternal Grandmother     Osteoporosis Maternal Grandmother     Breast Cancer Paternal Grandmother     Depression Sister     Diabetes Other     Cerebrovascular Disease Other     Cancer Other         lymphademia    Respiratory Other         emphazyma    Thyroid Disease Sister        Social History:  Marital Status:   [2]  Social History     Tobacco Use    Smoking status: Never     Passive exposure: Never    Smokeless tobacco: Never   Vaping Use    Vaping status: Never Used   Substance Use Topics    Alcohol use: Yes     Comment: occ.    Drug use: No        Medications:    acetaminophen (TYLENOL) 325 MG tablet  aspirin 325 MG tablet  calcium carbonate (OS-LUCIAN 500 MG [place]. [place]) 500 MG tablet  DAILY MULTI VITAMIN/MINERALS OR  lisinopril (ZESTRIL) 20 MG tablet  rosuvastatin (CRESTOR) 5 MG tablet          Review of Systems   Constitutional:  Negative for appetite change, chills, fatigue and fever.   HENT:  Negative for congestion.    Respiratory:  Negative for cough, chest tightness and shortness of breath.    Cardiovascular:  Positive for chest pain. Negative for palpitations and leg swelling.   Gastrointestinal:  Negative for  "abdominal pain and nausea.   Musculoskeletal:  Negative for myalgias.   Skin:  Negative for rash and wound.   Neurological:  Negative for light-headedness, numbness and headaches.   All other systems reviewed and are negative.      Physical Exam   BP: (!) 202/109  Pulse: 104  Temp: 98.4  F (36.9  C)  Resp: 18  Height: 162.6 cm (5' 4\")  Weight: 68 kg (150 lb)  SpO2: 97 %      Physical Exam  Vitals and nursing note reviewed.   Constitutional:       Appearance: She is well-developed. She is not ill-appearing or diaphoretic.   HENT:      Head: Atraumatic.   Cardiovascular:      Rate and Rhythm: Normal rate.      Heart sounds: Normal heart sounds.   Pulmonary:      Effort: Pulmonary effort is normal.      Breath sounds: Normal breath sounds.   Chest:      Chest wall: Tenderness (tender mid-sternum) present.   Musculoskeletal:         General: Normal range of motion.      Right lower leg: No tenderness. No edema.      Left lower leg: No tenderness. No edema.   Skin:     General: Skin is warm and dry.      Capillary Refill: Capillary refill takes less than 2 seconds.   Neurological:      Mental Status: She is alert and oriented to person, place, and time.   Psychiatric:         Mood and Affect: Mood normal.         ED Course        Procedures              EKG Interpretation:      Interpreted by Weston Prince MD  Time reviewed: 1000  Symptoms at time of EKG: chest pain   Rhythm: sinus tachycardia  Rate: 104  Axis: normal  Ectopy: none  Conduction: normal  ST Segments/ T Waves: No ST-T wave changes  Q Waves: none  Comparison to prior: New tachycardia compared to EKG 10/23/2019, otherwise similar morphology    Clinical Impression: normal EKG               Results for orders placed or performed during the hospital encounter of 12/06/24 (from the past 24 hours)   EKG 12-lead, tracing only   Result Value Ref Range    Systolic Blood Pressure  mmHg    Diastolic Blood Pressure  mmHg    Ventricular Rate 104 BPM    Atrial " Rate 104 BPM    TX Interval 160 ms    QRS Duration 80 ms     ms    QTc 441 ms    P Axis 60 degrees    R AXIS 46 degrees    T Axis 55 degrees    Interpretation ECG       Sinus tachycardia  Otherwise normal ECG  No previous ECGs available     [place] Draw    Narrative    The following orders were created for panel order [place] Draw.  Procedure                               Abnormality         Status                     ---------                               -----------         ------                     Extra Blue Top Tube[[id number]]                              Final result               Extra Green Top (Lithium...[[id number]]                      Final result               Extra Purple Top Tube[[id number]]                            Final result                 Please view results for these tests on the individual orders.   Extra Blue Top Tube   Result Value Ref Range    Hold Specimen JIC    Extra Green Top (Lithium Heparin) Tube   Result Value Ref Range    Hold Specimen JIC    Extra Purple Top Tube   Result Value Ref Range    Hold Specimen JIC    CBC with platelets, differential    Narrative    The following orders were created for panel order CBC with platelets, differential.  Procedure                               Abnormality         Status                     ---------                               -----------         ------                     CBC with platelets and d...[[id number]]                      Final result                 Please view results for these tests on the individual orders.   Comprehensive metabolic panel   Result Value Ref Range    Sodium 141 135 - 145 mmol/L    Potassium 3.5 3.4 - 5.3 mmol/L    Carbon Dioxide (CO2) 25 22 - 29 mmol/L    Anion Gap 13 7 - 15 mmol/L    Urea Nitrogen 14.8 8.0 - 23.0 mg/dL    Creatinine 0.97 (H) 0.51 - 0.95 mg/dL    GFR Estimate 60 (L) >60 mL/min/1.73m2    Calcium 9.3 8.8 - 10.4 mg/dL    Chloride 103 98 - 107 mmol/L    Glucose 124 (H) 70 - 99 mg/dL     Alkaline Phosphatase 129 40 - 150 U/L    AST 28 0 - 45 U/L    ALT 23 0 - 50 U/L    Protein Total 7.3 6.4 - 8.3 g/dL    Albumin 4.3 3.5 - 5.2 g/dL    Bilirubin Total 0.2 <=1.2 mg/dL   Troponin T, High Sensitivity   Result Value Ref Range    Troponin T, High Sensitivity <6 <=14 ng/L   CBC with platelets and differential   Result Value Ref Range    WBC Count 7.3 4.0 - 11.0 10e3/uL    RBC Count 4.21 3.80 - 5.20 10e6/uL    Hemoglobin 12.6 11.7 - 15.7 g/dL    Hematocrit 37.2 35.0 - 47.0 %    MCV 88 78 - 100 fL    MCH 29.9 26.5 - 33.0 pg    MCHC 33.9 31.5 - 36.5 g/dL    RDW 12.5 10.0 - 15.0 %    Platelet Count 264 150 - 450 10e3/uL    % Neutrophils 56 %    % Lymphocytes 28 %    % Monocytes 10 %    % Eosinophils 5 %    % Basophils 2 %    % Immature Granulocytes 0 %    NRBCs per 100 WBC 0 <1 /100    Absolute Neutrophils 4.1 1.6 - 8.3 10e3/uL    Absolute Lymphocytes 2.0 0.8 - 5.3 10e3/uL    Absolute Monocytes 0.7 0.0 - 1.3 10e3/uL    Absolute Eosinophils 0.4 0.0 - 0.7 10e3/uL    Absolute Basophils 0.1 0.0 - 0.2 10e3/uL    Absolute Immature Granulocytes 0.0 <=0.4 10e3/uL    Absolute NRBCs 0.0 10e3/uL   XR Chest 2 Views    Narrative    EXAM: XR CHEST 2 VIEWS  LOCATION: [facility]  DATE: 12/6/2024    INDICATION: chest pains  COMPARISON: 05/07/2024      Impression    IMPRESSION: Heart size and pulmonary vascularity normal. The lungs are clear. Surgical clips right breast.       Medications   ketorolac (TORADOL) injection 15 mg (15 mg Intravenous $Given 12/6/24 2236)     11:32 PM Patient re-assessed: Resting comfortably.  Patient reports she did have a brief episode of pain but it was not nearly as intense.  Advised of reassuring workup.   still with some chest wall tenderness.  Advised of symptomatic treatment plan and follow-up as needed.      Assessments & Plan (with Medical Decision Making)  74-year-old presenting for evaluation of episodes of chest pain over the past 2 days.  Having brief episodes  of sharp parasternal chest pain without any associated symptoms.  Well-appearing in ED no distress.  EKG nonischemic.  Troponin negative.  X-ray without any acute abnormality.  Symptoms improved after ketorolac.  Symptoms likely from chest wall strain versus costochondritis.  Less likely pleurisy.  No evidence of pneumonia.  Will treat symptomatically and have patient follow-up as needed.     I have reviewed the nursing notes.    I have reviewed the findings, diagnosis, plan and need for follow up with the patient.          New Prescriptions    No medications on file       Final diagnoses:   Chest pain, unspecified type       12/6/2024   [facility] EMERGENCY DEPT       Prince, Weston Fitzgerald MD  12/06/24 [id number]

## 2024-12-09 ENCOUNTER — PATIENT OUTREACH (OUTPATIENT)
Dept: FAMILY MEDICINE | Facility: CLINIC | Age: 74
End: 2024-12-09
Payer: COMMERCIAL

## 2024-12-09 NOTE — TELEPHONE ENCOUNTER
I called and spoke to patient, says the pain is gone, thinks it may have been due to hauling 5 gallon buckets of water  for her horses.      Transitions of Care Outreach  Chief Complaint   Patient presents with    Hospital F/U     12/6/24 [place] ER visit for chest pain       Most Recent Admission Date: 12/6/2024   Most Recent Admission Diagnosis:      Most Recent Discharge Date: 12/6/2024   Most Recent Discharge Diagnosis: Chest pain, unspecified type - R07.9     Transitions of Care Assessment    Discharge Assessment  How are you doing now that you are home?: pain is gone, gone by Sunday evening, wants to see Dr. Navarro to follow up; will call if symptoms return before visit (12/26/24)  How are your symptoms? (Red Flag symptoms escalate to triage hotline per guidelines): Improved  Do you know how to contact your clinic care team if you have future questions or changes to your health status? : Yes  Does the patient have their discharge instructions? : Yes  Does the patient have questions regarding their discharge instructions? : Yes (see comment)  Were you started on any new medications or were there changes to any of your previous medications? : No  Does the patient have all of their medications?: Yes  Do you have questions regarding any of your medications? : No  Do you have all of your needed medical supplies or equipment (DME)?  (i.e. oxygen tank, CPAP, cane, etc.): Yes    Follow up Plan     Discharge Follow-Up  Discharge follow up appointment scheduled in alignment with recommended follow up timeframe or Transitions of Risk Category? (Low = within 30 days; Moderate= within 14 days; High= within 7 days): No  Patient's follow up appointment not scheduled: Patient accepted scheduling support. Appt scheduled/requested per protocol.    Future Appointments   Date Time Provider Department Center   12/26/2024  3:00 PM Nhung Navarro MD [facility]   7/21/2025  2:15 PM Uma Hollins NP [facility]        Outpatient Plan as outlined on AVS reviewed with patient.    For any urgent concerns, please contact our 24 hour nurse triage line: [phone] ([phone])       Berta Mccollum RN

## 2024-12-09 NOTE — TELEPHONE ENCOUNTER
See 12/6/24 [facility] ER visit note:    Assessments & Plan (with Medical Decision Making)  74-year-old presenting for evaluation of episodes of chest pain over the past 2 days.  Having brief episodes of sharp parasternal chest pain without any associated symptoms.  Well-appearing in ED no distress.  EKG nonischemic.  Troponin negative.  X-ray without any acute abnormality.  Symptoms improved after ketorolac.  Symptoms likely from chest wall strain versus costochondritis.  Less likely pleurisy.  No evidence of pneumonia.  Will treat symptomatically and have patient follow-up as needed.           Routed to RN team to follow up with patient.    Berta RAZO RN  [facility] Triage

## 2024-12-26 ENCOUNTER — OFFICE VISIT (OUTPATIENT)
Dept: FAMILY MEDICINE | Facility: CLINIC | Age: 74
End: 2024-12-26
Payer: COMMERCIAL

## 2024-12-26 VITALS
DIASTOLIC BLOOD PRESSURE: 84 MMHG | WEIGHT: 159 LBS | BODY MASS INDEX: 27.14 KG/M2 | SYSTOLIC BLOOD PRESSURE: 130 MMHG | RESPIRATION RATE: 20 BRPM | TEMPERATURE: 98.1 F | OXYGEN SATURATION: 95 % | HEIGHT: 64 IN | HEART RATE: 84 BPM

## 2024-12-26 DIAGNOSIS — I10 HYPERTENSION GOAL BP (BLOOD PRESSURE) < 140/90: ICD-10-CM

## 2024-12-26 DIAGNOSIS — I63.81 LACUNAR INFARCTION (H): ICD-10-CM

## 2024-12-26 DIAGNOSIS — Z17.1 MALIGNANT NEOPLASM OF RIGHT BREAST IN FEMALE, ESTROGEN RECEPTOR NEGATIVE, UNSPECIFIED SITE OF BREAST (H): ICD-10-CM

## 2024-12-26 DIAGNOSIS — C50.911 MALIGNANT NEOPLASM OF RIGHT BREAST IN FEMALE, ESTROGEN RECEPTOR NEGATIVE, UNSPECIFIED SITE OF BREAST (H): ICD-10-CM

## 2024-12-26 DIAGNOSIS — M94.0 COSTOCHONDRITIS: Primary | ICD-10-CM

## 2024-12-26 PROCEDURE — 99214 OFFICE O/P EST MOD 30 MIN: CPT | Performed by: FAMILY MEDICINE

## 2024-12-26 PROCEDURE — G2211 COMPLEX E/M VISIT ADD ON: HCPCS | Performed by: FAMILY MEDICINE

## 2024-12-26 ASSESSMENT — PAIN SCALES - GENERAL: PAINLEVEL_OUTOF10: SEVERE PAIN (7)

## 2024-12-26 NOTE — PROGRESS NOTES
"  Assessment & Plan     (M94.0) Costochondritis  (primary encounter diagnosis)  Comment: Patient comes in for follow-up of chest pain.  ED visit reviewed, negative for ischemic heart disease.  On exam today, she has tenderness to palpation over the right mid sternal costal margin.  Plan: Appears noncardiac in etiology.  Advised to monitor.  Follow-up if pain returns worsens or other symptoms develop.    (C50.911,  Z17.1) Malignant neoplasm of right breast in female, estrogen receptor negative, unspecified site of breast (H)  Comment: No evidence of reoccurrence.  Plan: Continue routine screening and follow-up with oncology.    (I63.81) Lacunar infarction (H)  Comment: No apparent sequela I.  Continue secondary prevention.  Plan: Monitor.    (I10) Hypertension goal BP (blood pressure) < 140/90  Comment: Within guidelines.  Plan:     Patient Instructions   The pain seems consistent with a rib strain.  It is not your heart.    Continuing your current medications.    I would recommend a follow-up appointment in 6 months.         MED REC REQUIRED  Post Medication Reconciliation Status:  Discharge medications reconciled, continue medications without change  BMI  Estimated body mass index is 27.29 kg/m  as calculated from the following:    Height as of this encounter: 1.626 m (5' 4\").    Weight as of this encounter: 72.1 kg (159 lb).       Shakeel Atkins is a 74 year old, presenting for the following health issues:  Hospital F/U        12/26/2024     2:55 PM   Additional Questions   Roomed by Susan Glez CMA   Accompanied by      HPI       ED/UC Followup:    Facility:  [facility] Emergency Dept  Date of visit: 12/06/2024  Reason for visit: Chest Pain   Current Status: After a few days it had went away.          Review of Systems  Constitutional, HEENT, cardiovascular, pulmonary, gi and gu systems are negative, except as otherwise noted.      Objective    /84   Pulse 84   Temp 98.1  F " "(36.7  C) (Oral)   Resp 20   Ht 1.626 m (5' 4\")   Wt 72.1 kg (159 lb)   LMP  (LMP Unknown)   SpO2 95%   BMI 27.29 kg/m    Body mass index is 27.29 kg/m .  Physical Exam   GENERAL: Healthy, alert and no distress  EYES: Eyes grossly normal to inspection, conjunctivae and sclerae normal  RESP: Lungs clear to auscultation - no rales, rhonchi or wheezes  CV: Regular rate and rhythm, normal S1 S2, no murmur  MS: Along the right sternal costal margin, there is mild tenderness to palpation without deformity  NEURO: Normal strength and tone, mentation intact and speech normal  PSYCH: Mentation appears normal, affect normal/bright     The longitudinal plan of care for the diagnosis(es)/condition(s) as documented were addressed during this visit. Due to the added complexity in care, I will continue to support Milly in the subsequent management and with ongoing continuity of care.         Signed Electronically by: Nhung Navarro MD    "

## 2024-12-29 NOTE — PATIENT INSTRUCTIONS
The pain seems consistent with a rib strain.  It is not your heart.    Continuing your current medications.    I would recommend a follow-up appointment in 6 months.

## 2025-06-20 ENCOUNTER — OFFICE VISIT (OUTPATIENT)
Dept: FAMILY MEDICINE | Facility: CLINIC | Age: 75
End: 2025-06-20
Payer: COMMERCIAL

## 2025-06-20 VITALS
SYSTOLIC BLOOD PRESSURE: 134 MMHG | BODY MASS INDEX: 27.66 KG/M2 | RESPIRATION RATE: 20 BRPM | DIASTOLIC BLOOD PRESSURE: 82 MMHG | HEART RATE: 92 BPM | HEIGHT: 64 IN | WEIGHT: 162 LBS | OXYGEN SATURATION: 98 % | TEMPERATURE: 97.2 F

## 2025-06-20 DIAGNOSIS — I63.81 LACUNAR INFARCTION (H): ICD-10-CM

## 2025-06-20 DIAGNOSIS — C50.911 MALIGNANT NEOPLASM OF RIGHT BREAST IN FEMALE, ESTROGEN RECEPTOR NEGATIVE, UNSPECIFIED SITE OF BREAST (H): ICD-10-CM

## 2025-06-20 DIAGNOSIS — Z23 NEED FOR VACCINATION: ICD-10-CM

## 2025-06-20 DIAGNOSIS — I10 HYPERTENSION GOAL BP (BLOOD PRESSURE) < 140/90: ICD-10-CM

## 2025-06-20 DIAGNOSIS — E78.5 HYPERLIPIDEMIA LDL GOAL <100: ICD-10-CM

## 2025-06-20 DIAGNOSIS — Z17.1 MALIGNANT NEOPLASM OF RIGHT BREAST IN FEMALE, ESTROGEN RECEPTOR NEGATIVE, UNSPECIFIED SITE OF BREAST (H): ICD-10-CM

## 2025-06-20 DIAGNOSIS — Z00.00 MEDICARE ANNUAL WELLNESS VISIT, SUBSEQUENT: Primary | ICD-10-CM

## 2025-06-20 DIAGNOSIS — R94.6 ABNORMAL FINDING ON THYROID FUNCTION TEST: ICD-10-CM

## 2025-06-20 PROCEDURE — 3075F SYST BP GE 130 - 139MM HG: CPT | Performed by: FAMILY MEDICINE

## 2025-06-20 PROCEDURE — G2211 COMPLEX E/M VISIT ADD ON: HCPCS | Performed by: FAMILY MEDICINE

## 2025-06-20 PROCEDURE — 80053 COMPREHEN METABOLIC PANEL: CPT | Performed by: FAMILY MEDICINE

## 2025-06-20 PROCEDURE — 36415 COLL VENOUS BLD VENIPUNCTURE: CPT | Performed by: FAMILY MEDICINE

## 2025-06-20 PROCEDURE — 99213 OFFICE O/P EST LOW 20 MIN: CPT | Mod: 25 | Performed by: FAMILY MEDICINE

## 2025-06-20 PROCEDURE — 1125F AMNT PAIN NOTED PAIN PRSNT: CPT | Performed by: FAMILY MEDICINE

## 2025-06-20 PROCEDURE — 3078F DIAST BP <80 MM HG: CPT | Performed by: FAMILY MEDICINE

## 2025-06-20 PROCEDURE — G0439 PPPS, SUBSEQ VISIT: HCPCS | Performed by: FAMILY MEDICINE

## 2025-06-20 PROCEDURE — 84443 ASSAY THYROID STIM HORMONE: CPT | Performed by: FAMILY MEDICINE

## 2025-06-20 RX ORDER — EZETIMIBE 10 MG/1
10 TABLET ORAL DAILY
Qty: 90 TABLET | Refills: 3 | Status: SHIPPED | OUTPATIENT
Start: 2025-06-20

## 2025-06-20 SDOH — HEALTH STABILITY: PHYSICAL HEALTH: ON AVERAGE, HOW MANY DAYS PER WEEK DO YOU ENGAGE IN MODERATE TO STRENUOUS EXERCISE (LIKE A BRISK WALK)?: 7 DAYS

## 2025-06-20 ASSESSMENT — PAIN SCALES - GENERAL: PAINLEVEL_OUTOF10: MODERATE PAIN (4)

## 2025-06-20 ASSESSMENT — SOCIAL DETERMINANTS OF HEALTH (SDOH): HOW OFTEN DO YOU GET TOGETHER WITH FRIENDS OR RELATIVES?: TWICE A WEEK

## 2025-06-20 NOTE — PROGRESS NOTES
Preventive Care Visit  [facility] TITO Navarro MD, Family Medicine  Jun 20, 2025      Assessment & Plan     (Z00.00) Medicare annual wellness visit, subsequent  (primary encounter diagnosis)  Comment:  Reviewed the need for periodic healthcare exams and screenings.   Plan: REVIEW OF HEALTH MAINTENANCE PROTOCOL ORDERS        Advised yearly physicals.     (C50.911,  Z17.1) Malignant neoplasm of right breast in female, estrogen receptor negative, unspecified site of breast (H)  Comment: No evidence of recurrence.  Normal liver function testing.  Mammogram current.  Plan: Comprehensive metabolic panel (BMP + Alb, Alk         Phos, ALT, AST, Total. Bili, TP)        Advise annual follow-up with oncology.    (R94.6) Abnormal finding on thyroid function test  Comment: Clinically euthyroid, TSH normal.  Not on thyroid medications.  Plan: TSH with free T4 reflex        Monitor.    (I10) Hypertension goal BP (blood pressure) < 140/90  Comment: Second reading within guidelines.  Normal renal function.  Plan: Comprehensive metabolic panel (BMP + Alb, Alk         Phos, ALT, AST, Total. Bili, TP)        Continue ARB therapy.    (E78.5) Hyperlipidemia LDL goal <100  Comment: Intolerant to statins, advised that she take nonstatin medications to help lower cholesterol.  Plan: ezetimibe (ZETIA) 10 MG tablet        Patient will consider this recommendation.    (Z23) Need for vaccination  Comment: Routine vaccinations encouraged, patient undecided if he wishes to pursue this option.  Prescription sent to her pharmacy.  Plan: zoster vaccine recombinant adjuvanted         (SHINGRIX) injection, Tdap,         tetanus-diptheria-acell pertussis, (BOOSTRIX)         5-2.5-18.5 LF-MCG/0.5 KENNEY injection, RSV         vaccine, bivalent, ABRYSVO, injection            Results for orders placed or performed in visit on 06/20/25   Comprehensive metabolic panel (BMP + Alb, Alk Phos, ALT, AST, Total. Bili, TP)     Status:  "Normal   Result Value Ref Range    Sodium 141 135 - 145 mmol/L    Potassium 4.0 3.4 - 5.3 mmol/L    Carbon Dioxide (CO2) 24 22 - 29 mmol/L    Anion Gap 11 7 - 15 mmol/L    Urea Nitrogen 11.0 8.0 - 23.0 mg/dL    Creatinine 0.92 0.51 - 0.95 mg/dL    GFR Estimate 64 >60 mL/min/1.73m2    Calcium 9.6 8.8 - 10.4 mg/dL    Chloride 106 98 - 107 mmol/L    Glucose 97 70 - 99 mg/dL    Alkaline Phosphatase 119 40 - 150 U/L    AST 31 0 - 45 U/L    ALT 22 0 - 50 U/L    Protein Total 7.3 6.4 - 8.3 g/dL    Albumin 4.4 3.5 - 5.2 g/dL    Bilirubin Total 0.3 <=1.2 mg/dL   TSH with free T4 reflex     Status: Normal   Result Value Ref Range    TSH 2.52 0.30 - 4.20 uIU/mL        .  Patient Instructions     Wt Readings from Last 4 Encounters:   06/20/25 73.5 kg (162 lb)   12/26/24 72.1 kg (159 lb)   12/06/24 68 kg (150 lb)   07/22/24 71.3 kg (157 lb 3.2 oz)     Stop by the pharmacy for the tetanus, RSV, and shingles shots.     Blood tests today.     For cholesterol, try Gabe's medication, Zetia.     Yearly check up.      Patient has been advised of split billing requirements and indicates understanding: Yes        BMI  Estimated body mass index is 27.81 kg/m  as calculated from the following:    Height as of this encounter: 1.626 m (5' 4\").    Weight as of this encounter: 73.5 kg (162 lb).     Reviewed preventive health counseling, as reflected in patient instructions  Counseling  Appropriate preventive services were addressed with this patient via screening, questionnaire, or discussion as appropriate for fall prevention, nutrition, physical activity, Tobacco-use cessation, social engagement, weight loss and cognition.  Checklist reviewing preventive services available has been given to the patient.  Reviewed patient's diet, addressing concerns and/or questions.   The patient was instructed to see the dentist every 6 months.   She is at risk for psychosocial distress and has been provided with information to reduce risk.   Addressed any " concerns about safety while driving.            Shakeel Atkins is a 74 year old, presenting for the following:  Physical        6/20/2025     1:14 PM   Additional Questions   Roomed by Susan Glez CMA   Accompanied by          6/20/2025     1:14 PM   Patient Reported Additional Medications   Patient reports taking the following new medications None          HPI    Advance Care Planning    Advance care planning document is on file but is outdated.  Patient encouraged to update or provider to update POLST.        6/20/2025   General Health   How would you rate your overall physical health? Excellent   Feel stress (tense, anxious, or unable to sleep) To some extent   (!) STRESS CONCERN      6/20/2025   Nutrition   Diet: I don't know         6/20/2025   Exercise   Days per week of moderate/strenous exercise 7 days         6/20/2025   Social Factors   Frequency of gathering with friends or relatives Twice a week   Worry food won't last until get money to buy more No   Food not last or not have enough money for food? No   Do you have housing? (Housing is defined as stable permanent housing and does not include staying outside in a car, in a tent, in an abandoned building, in an overnight shelter, or couch-surfing.) Yes   Are you worried about losing your housing? No   Lack of transportation? No   Unable to get utilities (heat,electricity)? No         6/20/2025   Fall Risk   Fallen 2 or more times in the past year? No   Trouble with walking or balance? No          6/20/2025   Activities of Daily Living- Home Safety   Needs help with the following daily activites None of the above   Safety concerns in the home None of the above         6/20/2025   Dental   Dentist two times every year? (!) NO         6/20/2025   Hearing Screening   Hearing concerns? None of the above         6/20/2025   Driving Risk Screening   Patient/family members have concerns about driving (!) YES          6/20/2025   General  Alertness/Fatigue Screening   Have you been more tired than usual lately? No         6/20/2025   Urinary Incontinence Screening   Bothered by leaking urine in past 6 months No         Today's PHQ-2 Score:       6/20/2025     1:12 PM   PHQ-2 ( 1999 [employer])   Q1: Little interest or pleasure in doing things 0   Q2: Feeling down, depressed or hopeless 0   PHQ-2 Score 0    Q1: Little interest or pleasure in doing things Not at all   Q2: Feeling down, depressed or hopeless Not at all   PHQ-2 Score 0       Patient-reported           6/20/2025   Substance Use   Alcohol more than 3/day or more than 7/wk Not Applicable   Do you have a current opioid prescription? No   How severe/bad is pain from 1 to 10? 2/10   Do you use any other substances recreationally? No     Social History     Tobacco Use    Smoking status: Never     Passive exposure: Never    Smokeless tobacco: Never   Vaping Use    Vaping status: Never Used   Substance Use Topics    Alcohol use: Yes     Comment: occ.    Drug use: No           10/31/2023   LAST FHS-7 RESULTS   1st degree relative breast or ovarian cancer No   Any relative bilateral breast cancer Unknown   Any male have breast cancer No   Any ONE woman have BOTH breast AND ovarian cancer No   Any woman with breast cancer before 50yrs No   2 or more relatives with breast AND/OR ovarian cancer No   2 or more relatives with breast AND/OR bowel cancer No        Annual mammograms.     ASCVD Risk   The ASCVD Risk score (Kenton JUDD, et al., 2019) failed to calculate for the following reasons:    Risk score cannot be calculated because patient has a medical history suggesting prior/existing ASCVD            Reviewed and updated as needed this visit by Provider                    Labs reviewed in EPIC  Current providers sharing in care for this patient include:  Patient Care Team:  Nhung Navarro MD as PCP - General  Kindra Reagan RN as Specialty Care Coordinator (Oncology)  Evert Segura MD as  "MD (Radiation Oncology)  Cinda Bee, APRN CNP as Nurse Practitioner (Nurse Practitioner)  Nhung Navarro MD as Assigned PCP  Uma Hollins NP as Assigned Cancer Care Provider    The following health maintenance items are reviewed in Epic and correct as of today:  Health Maintenance   Topic Date Due    DEXA  Never done    HEPATITIS C SCREENING  Never done    DTAP/TDAP/TD VACCINE (1 - Tdap) Never done    PNEUMOCOCCAL VACCINE 50+ YEARS (1 of 1 - PCV) Never done    ZOSTER VACCINE (1 of 2) Never done    RSV VACCINE (1 - 1-dose 75+ series) Never done    COVID-19 VACCINE (1 - 2024-25 season) Never done    MEDICARE ANNUAL WELLNESS VISIT  06/06/2025    INFLUENZA VACCINE (Season Ended) 09/01/2025    MAMMO SCREENING  11/01/2025    BMP  06/20/2026    ANNUAL REVIEW OF HM ORDERS  06/20/2026    FALL RISK ASSESSMENT  06/20/2026    DIABETES SCREENING  06/20/2028    LIPID  06/06/2029    ADVANCE CARE PLANNING  06/06/2029    PHQ-2 (once per calendar year)  Completed    HPV VACCINE  Aged Out    MENINGITIS VACCINE  Aged Out         Review of Systems  Constitutional, HEENT, cardiovascular, pulmonary, gi and gu systems are negative, except as otherwise noted.     Objective    Exam  /82   Pulse 92   Temp 97.2  F (36.2  C) (Temporal)   Resp 20   Ht 1.626 m (5' 4\")   Wt 73.5 kg (162 lb)   LMP  (LMP Unknown)   SpO2 98%   BMI 27.81 kg/m     Estimated body mass index is 27.81 kg/m  as calculated from the following:    Height as of this encounter: 1.626 m (5' 4\").    Weight as of this encounter: 73.5 kg (162 lb).    Physical Exam  GENERAL: Healthy, alert and no distress  EYES: Eyes grossly normal to inspection, conjunctivae and sclerae normal  RESP: Lungs clear to auscultation - no rales, rhonchi or wheezes  CV: Regular rate and rhythm, normal S1 S2, no murmur  MS: No gross musculoskeletal defects noted, no edema  NEURO: Normal strength and tone, mentation intact and speech normal  PSYCH: Mentation appears normal, affect " normal/bright     The longitudinal plan of care for the diagnosis(es)/condition(s) as documented were addressed during this visit. Due to the added complexity in care, I will continue to support Milly in the subsequent management and with ongoing continuity of care.         6/20/2025   Mini Cog   Mini-Cog Not Completed (choose reason) Patient declines       Patient declines, there are NO concerns for cognitive deficits.           Signed Electronically by: Nhung Navarro MD

## 2025-06-20 NOTE — PATIENT INSTRUCTIONS
Wt Readings from Last 4 Encounters:   06/20/25 73.5 kg (162 lb)   12/26/24 72.1 kg (159 lb)   12/06/24 68 kg (150 lb)   07/22/24 71.3 kg (157 lb 3.2 oz)     Stop by the pharmacy for the tetanus, RSV, and shingles shots.     Blood tests today.     For cholesterol, try Gabe's medication, Zetia.     Yearly check up.

## 2025-06-21 LAB
ALBUMIN SERPL BCG-MCNC: 4.4 G/DL (ref 3.5–5.2)
ALP SERPL-CCNC: 119 U/L (ref 40–150)
ALT SERPL W P-5'-P-CCNC: 22 U/L (ref 0–50)
ANION GAP SERPL CALCULATED.3IONS-SCNC: 11 MMOL/L (ref 7–15)
AST SERPL W P-5'-P-CCNC: 31 U/L (ref 0–45)
BILIRUB SERPL-MCNC: 0.3 MG/DL
BUN SERPL-MCNC: 11 MG/DL (ref 8–23)
CALCIUM SERPL-MCNC: 9.6 MG/DL (ref 8.8–10.4)
CHLORIDE SERPL-SCNC: 106 MMOL/L (ref 98–107)
CREAT SERPL-MCNC: 0.92 MG/DL (ref 0.51–0.95)
EGFRCR SERPLBLD CKD-EPI 2021: 64 ML/MIN/1.73M2
GLUCOSE SERPL-MCNC: 97 MG/DL (ref 70–99)
HCO3 SERPL-SCNC: 24 MMOL/L (ref 22–29)
POTASSIUM SERPL-SCNC: 4 MMOL/L (ref 3.4–5.3)
PROT SERPL-MCNC: 7.3 G/DL (ref 6.4–8.3)
SODIUM SERPL-SCNC: 141 MMOL/L (ref 135–145)
TSH SERPL DL<=0.005 MIU/L-ACNC: 2.52 UIU/ML (ref 0.3–4.2)

## 2025-06-23 ENCOUNTER — RESULTS FOLLOW-UP (OUTPATIENT)
Dept: FAMILY MEDICINE | Facility: CLINIC | Age: 75
End: 2025-06-23
Payer: COMMERCIAL

## 2025-06-23 NOTE — LETTER
"June 23, 2025      Milly Loaiza  [address] [facility] [address]        Dear ,    We are writing to inform you of your test results.    \"Milly,     Your recent blood tests were all normal.  They show that you have normal kidney, liver, and thyroid function.  Please call our clinic with any questions or concerns.\"    Nhung Navarro MD     Resulted Orders   Comprehensive metabolic panel (BMP + Alb, Alk Phos, ALT, AST, Total. Bili, TP)   Result Value Ref Range    Sodium 141 135 - 145 mmol/L    Potassium 4.0 3.4 - 5.3 mmol/L    Carbon Dioxide (CO2) 24 22 - 29 mmol/L    Anion Gap 11 7 - 15 mmol/L    Urea Nitrogen 11.0 8.0 - 23.0 mg/dL    Creatinine 0.92 0.51 - 0.95 mg/dL    GFR Estimate 64 >60 mL/min/1.73m2      Comment:      eGFR calculated using 2021 CKD-EPI equation.    Calcium 9.6 8.8 - 10.4 mg/dL    Chloride 106 98 - 107 mmol/L    Glucose 97 70 - 99 mg/dL    Alkaline Phosphatase 119 40 - 150 U/L    AST 31 0 - 45 U/L    ALT 22 0 - 50 U/L    Protein Total 7.3 6.4 - 8.3 g/dL    Albumin 4.4 3.5 - 5.2 g/dL    Bilirubin Total 0.3 <=1.2 mg/dL   TSH with free T4 reflex   Result Value Ref Range    TSH 2.52 0.30 - 4.20 uIU/mL       If you have any questions or concerns, please call the clinic at the number listed above.       Sincerely,    Nhung Navarro MD       Electronically signed        "

## 2025-07-21 ENCOUNTER — ONCOLOGY VISIT (OUTPATIENT)
Dept: ONCOLOGY | Facility: CLINIC | Age: 75
End: 2025-07-21
Attending: INTERNAL MEDICINE
Payer: COMMERCIAL

## 2025-07-21 VITALS
OXYGEN SATURATION: 98 % | HEIGHT: 64 IN | DIASTOLIC BLOOD PRESSURE: 84 MMHG | HEART RATE: 79 BPM | SYSTOLIC BLOOD PRESSURE: 149 MMHG | RESPIRATION RATE: 16 BRPM | WEIGHT: 160 LBS | TEMPERATURE: 98.9 F | BODY MASS INDEX: 27.31 KG/M2

## 2025-07-21 DIAGNOSIS — C50.911 MALIGNANT NEOPLASM OF RIGHT BREAST IN FEMALE, ESTROGEN RECEPTOR NEGATIVE, UNSPECIFIED SITE OF BREAST (H): Primary | ICD-10-CM

## 2025-07-21 DIAGNOSIS — Z17.1 MALIGNANT NEOPLASM OF RIGHT BREAST IN FEMALE, ESTROGEN RECEPTOR NEGATIVE, UNSPECIFIED SITE OF BREAST (H): Primary | ICD-10-CM

## 2025-07-21 PROCEDURE — G0463 HOSPITAL OUTPT CLINIC VISIT: HCPCS | Performed by: NURSE PRACTITIONER

## 2025-07-21 PROCEDURE — 99214 OFFICE O/P EST MOD 30 MIN: CPT | Performed by: NURSE PRACTITIONER

## 2025-07-21 PROCEDURE — G2211 COMPLEX E/M VISIT ADD ON: HCPCS | Performed by: NURSE PRACTITIONER

## 2025-07-21 ASSESSMENT — PAIN SCALES - GENERAL: PAINLEVEL_OUTOF10: MODERATE PAIN (4)

## 2025-07-21 NOTE — PROGRESS NOTES
"Oncology Rooming Note    July 21, 2025 2:11 PM   Milly Loaiza is a 74 year old female who presents for:    Chief Complaint   Patient presents with    Oncology Clinic Visit     DCIS (ductal carcinoma in situ) - Provider visit only     Initial Vitals: BP (!) 173/86 (BP Location: Left arm, Patient Position: Sitting, Cuff Size: Adult Large)   Pulse 79   Temp 98.9  F (37.2  C) (Tympanic)   Resp 16   Ht 1.626 m (5' 4\")   Wt 72.6 kg (160 lb)   LMP  (LMP Unknown)   SpO2 98%   BMI 27.46 kg/m   Estimated body mass index is 27.46 kg/m  as calculated from the following:    Height as of this encounter: 1.626 m (5' 4\").    Weight as of this encounter: 72.6 kg (160 lb). Body surface area is 1.81 meters squared.  Moderate Pain (4) Comment: Data Unavailable   No LMP recorded (lmp unknown). Patient is postmenopausal.  Allergies reviewed: Yes  Medications reviewed: Yes    Medications: Medication refills not needed today.  Pharmacy name entered into [facility]: [facility] [id number] [facility]    PHQ9:  Did this patient require a PHQ9?: No      Clinical concerns:  None today      Nunu Robins CMA              "

## 2025-07-21 NOTE — LETTER
7/21/2025      Milly Loaiza  [address] [facility] [address]      Dear Colleague,    Thank you for referring your patient, Milly Loaiza, to the [facility]. Please see a copy of my visit note below.    [facility] Hematology and Oncology Progress Note    Patient: Milly Loaiza  MRN: [id number]  Date of Service: Jul 21, 2025          Reason for Visit    History of right breast cancer (triple negative) x3    Primary oncologist: Dr. Urena    Assessment and Plan  Recurrent triple negative invasive breast cancer x2 (2004, 2020)  History of DCIS right breast x1 (2009)  Has had recurrent triple negative breast cancer twice now (latest in 2020) and DCIS in 2009.  Underwent surgery and radiation x2.  With her first diagnosis 2004 she did have chemotherapy but did not tolerate this well, so declined repeating chemotherapy with her last diagnosis in 2020.      Clinically, doing well today.   No evidence for recurrent disease on exam.  Mammogram 11/2024 negative.    Plan:  -Continue annual screening 3D mammograms every November  -She has preferred annual exams, return in 1 year with Uma (she prefers female provider)  -Contact us sooner with any new symptoms/concerns  -We will follow annually in oncology for at least 5-10 years past her last diagnosis in 2020 (through 2030)    Elevated BP  BP a bit elevated today. On recheck manually 149/84.   She notes her BP tends to run a bit higher at medical visits.  On lisinopril.     Plan:  -Monitor BP outside of visits. See PCP for mgmt       Cancer Staging   Invasive ductal carcinoma of breast, right (H)  Staging form: Breast, AJCC 8th Edition  - Pathologic: Stage IIA (pT2, pN0, cM0, G3, ER-, WI-, HER2-) - Signed by Faisal Urena MD on 7/21/2022    Malignant neoplasm of female breast (H)  Staging form: Breast, AJCC 8th Edition  - Pathologic: Stage Unknown (pT1b, pNX, cM0, G3, ER-, WI-, HER2-) - Signed by Romel  MD Faisal on 7/21/2022      ECOG Performance    0       Oncology history  2004: Stage IA (T2-N0-M0) right triple negative invasive ductal carcinoma breast  -Detected on screening mammogram  -Surgical pathology: T2 lesion.  No nodes.  ER negative, AR negative, HER2 negative  -Lumpectomy, adjuvant chemo, and adjuvant radiation     2009: Right breast high-grade DCIS.  Indeterminant ER/AR status (not enough tissue)   -Detected on routine screening mammogram   -Biopsy: High-grade DCIS with necrosis.  Inadequate tissue for ER/AR studies.    -Lumpectomy surgical pathology: No residual cancer    2020: Stage IA (fQ2h-BZ) right triple negative invasive ductal carcinoma breast  -Ultrasound biopsy: Grade 3 IDC.  ER negative, AR negative, HER2 FISH equivocal, IHC 1+ (HER2 negative)  -Lumpectomy pathology: 1 cm IDC, grade 2, no LVI.  No lymph nodes were removed due to prior LND surgery  -Lumpectomy and adjuvant radiation.      Genetic testing negative.    Treatment:  First occurrence (invasive triple negative):  2/2004 -Right breast lumpectomy   3/2004 - began ACx 3 cycles and could not tolerate anymore chemotherapy, followed by radiation.    2004 -adjuvant radiation    2nd occurrence (DCIS):  3/2009 - right lumpectomy without residual invasive cancer    3rd occurrence (invasive triple negative):  1/2020-  right lumpectomy  4/2020 - adjuvant RT  --Due to the small size of the lesion, her prior poor tolerance to chemotherapy chemo was not done.    Interval History   Milly Loaiza is a 74 year old female currently on observation who is seen in oncology clinic for 1-yr follow-up for history of extensive right breast cancer recurrences, triple negative. It's been  5.5 yrs since her last recurrence. Returns for 1-yr exam.    She's had lumpectomies and RT.     No breast concerns. No weight loss. Chronic intermittent low back pain, non-progressive. No new sites of pain. Active with her horses. No unusual headaches.  "      Physical Exam    General: alert and cooperative.   accompanies.  HEENT: No icterus  Lymph: No palpable cervical nor axillary adenopathy  Breast: Multiple right breast lumpectomy scars noted with fibrous tissue and inverted nipple. No discrete lumps.  No skin changes.  Left breast is normal.    Chest: Clear to auscultation bilaterally  Cardiac: RRR  Abdomen: abdomen is soft without significant tenderness, masses, organomegaly or guarding  Extremities: atraumatic, no peripheral edema  Skin: no rashes  Neuro: Nonfocal    Lab Results    No results found for this or any previous visit (from the past week).    Imaging    No results found.    Total time 30 minutes, to include face to face visit, review of EMR, ordering, documentation and coordination of care on date of service.    complexity modifier for longitudinal care.         Signed by: Uma Hollins NP    Oncology Rooming Note    July 21, 2025 2:11 PM   Milly Loaiza is a 74 year old female who presents for:    Chief Complaint   Patient presents with     Oncology Clinic Visit     DCIS (ductal carcinoma in situ) - Provider visit only     Initial Vitals: BP (!) 173/86 (BP Location: Left arm, Patient Position: Sitting, Cuff Size: Adult Large)   Pulse 79   Temp 98.9  F (37.2  C) (Tympanic)   Resp 16   Ht 1.626 m (5' 4\")   Wt 72.6 kg (160 lb)   LMP  (LMP Unknown)   SpO2 98%   BMI 27.46 kg/m   Estimated body mass index is 27.46 kg/m  as calculated from the following:    Height as of this encounter: 1.626 m (5' 4\").    Weight as of this encounter: 72.6 kg (160 lb). Body surface area is 1.81 meters squared.  Moderate Pain (4) Comment: Data Unavailable   No LMP recorded (lmp unknown). Patient is postmenopausal.  Allergies reviewed: Yes  Medications reviewed: Yes    Medications: Medication refills not needed today.  Pharmacy name entered into [facility]: [facility] - [address] [facility]    PHQ9:  Did this patient require a " PHQ9?: No      Clinical concerns:  None today      Nunu Robins, CMA                Again, thank you for allowing me to participate in the care of your patient.        Sincerely,        Uma Hollins NP    Electronically signed

## 2025-07-21 NOTE — PROGRESS NOTES
[facility] Hematology and Oncology Progress Note    Patient: Milly Loaiza  MRN: [id number]  Date of Service: Jul 21, 2025          Reason for Visit    History of right breast cancer (triple negative) x3    Primary oncologist: Dr. Urena    Assessment and Plan  Recurrent triple negative invasive breast cancer x2 (2004, 2020)  History of DCIS right breast x1 (2009)  Has had recurrent triple negative breast cancer twice now (latest in 2020) and DCIS in 2009.  Underwent surgery and radiation x2.  With her first diagnosis 2004 she did have chemotherapy but did not tolerate this well, so declined repeating chemotherapy with her last diagnosis in 2020.      Clinically, doing well today.   No evidence for recurrent disease on exam.  Mammogram 11/2024 negative.    Plan:  -Continue annual screening 3D mammograms every November  -She has preferred annual exams, return in 1 year with Uma (she prefers female provider)  -Contact us sooner with any new symptoms/concerns  -We will follow annually in oncology for at least 5-10 years past her last diagnosis in 2020 (through 2030)    Elevated BP  BP a bit elevated today. On recheck manually 149/84.   She notes her BP tends to run a bit higher at medical visits.  On lisinopril.     Plan:  -Monitor BP outside of visits. See PCP for mgmt       Cancer Staging   Invasive ductal carcinoma of breast, right (H)  Staging form: Breast, AJCC 8th Edition  - Pathologic: Stage IIA (pT2, pN0, cM0, G3, ER-, AL-, HER2-) - Signed by Faisal Urena MD on 7/21/2022    Malignant neoplasm of female breast (H)  Staging form: Breast, AJCC 8th Edition  - Pathologic: Stage Unknown (pT1b, pNX, cM0, G3, ER-, AL-, HER2-) - Signed by Faisal Urena MD on 7/21/2022      ECOG Performance    0       Oncology history  2004: Stage IA (T2-N0-M0) right triple negative invasive ductal carcinoma breast  -Detected on screening mammogram  -Surgical pathology: T2 lesion.  No nodes.  ER negative, AL  negative, HER2 negative  -Lumpectomy, adjuvant chemo, and adjuvant radiation     2009: Right breast high-grade DCIS.  Indeterminant ER/SD status (not enough tissue)   -Detected on routine screening mammogram   -Biopsy: High-grade DCIS with necrosis.  Inadequate tissue for ER/SD studies.    -Lumpectomy surgical pathology: No residual cancer    2020: Stage IA (pL6y-LM) right triple negative invasive ductal carcinoma breast  -Ultrasound biopsy: Grade 3 IDC.  ER negative, SD negative, HER2 FISH equivocal, IHC 1+ (HER2 negative)  -Lumpectomy pathology: 1 cm IDC, grade 2, no LVI.  No lymph nodes were removed due to prior LND surgery  -Lumpectomy and adjuvant radiation.      Genetic testing negative.    Treatment:  First occurrence (invasive triple negative):  2/2004 -Right breast lumpectomy   3/2004 - began ACx 3 cycles and could not tolerate anymore chemotherapy, followed by radiation.    2004 -adjuvant radiation    2nd occurrence (DCIS):  3/2009 - right lumpectomy without residual invasive cancer    3rd occurrence (invasive triple negative):  1/2020-  right lumpectomy  4/2020 - adjuvant RT  --Due to the small size of the lesion, her prior poor tolerance to chemotherapy chemo was not done.    Interval History   Milly Loaiza is a 74 year old female currently on observation who is seen in oncology clinic for 1-yr follow-up for history of extensive right breast cancer recurrences, triple negative. It's been  5.5 yrs since her last recurrence. Returns for 1-yr exam.    She's had lumpectomies and RT.     No breast concerns. No weight loss. Chronic intermittent low back pain, non-progressive. No new sites of pain. Active with her horses. No unusual headaches.       Physical Exam    General: alert and cooperative.   accompanies.  HEENT: No icterus  Lymph: No palpable cervical nor axillary adenopathy  Breast: Multiple right breast lumpectomy scars noted with fibrous tissue and inverted nipple. No discrete lumps.  No  skin changes.  Left breast is normal.    Chest: Clear to auscultation bilaterally  Cardiac: RRR  Abdomen: abdomen is soft without significant tenderness, masses, organomegaly or guarding  Extremities: atraumatic, no peripheral edema  Skin: no rashes  Neuro: Nonfocal    Lab Results    No results found for this or any previous visit (from the past week).    Imaging    No results found.    Total time 30 minutes, to include face to face visit, review of EMR, ordering, documentation and coordination of care on date of service.    complexity modifier for longitudinal care.         Signed by: Uma Hollins NP

## (undated) DEVICE — SOL WATER IRRIG 1000ML BOTTLE 2F7114

## (undated) DEVICE — LIGHT HANDLE X2

## (undated) DEVICE — DRSG GAUZE 4X4" TRAY

## (undated) DEVICE — PREP CHLORAPREP 26ML TINTED ORANGE  260815

## (undated) DEVICE — NDL 22GA 1.5"

## (undated) DEVICE — PACK LAP TRANSVERSE STD

## (undated) DEVICE — SU VICRYL 3-0 SH 27" UND J416H

## (undated) DEVICE — MARKER MARGIN MARKER STD 6 COLOR SGL USE MMS6

## (undated) DEVICE — SOL NACL 0.9% IRRIG 1000ML BOTTLE 2F7124

## (undated) DEVICE — GLOVE PROTEXIS BLUE W/NEU-THERA 8.0  2D73EB80

## (undated) DEVICE — DRAPE SHEET REV FOLD 3/4 9349

## (undated) DEVICE — TUBING SUCTION 12"X1/4" N612

## (undated) DEVICE — GOWN XLG DISP 9545

## (undated) DEVICE — NDL COUNTER 20CT 31142493

## (undated) DEVICE — LABEL MEDICATION SYSTEM  3304

## (undated) DEVICE — SU MONOCRYL 4-0 PS-2 18" UND Y496G

## (undated) DEVICE — SUCTION TIP YANKAUER STR K87

## (undated) DEVICE — SOL WATER IRRIG 1000ML BOTTLE 07139-09

## (undated) DEVICE — SYR BULB IRRIG DOVER 60 ML LATEX FREE 67000

## (undated) DEVICE — ESU PENCIL SMOKE EVAC W/ROCKER SWITCH 0703-047-000

## (undated) DEVICE — BINDER MAMMARY LACE 9X36-45"

## (undated) DEVICE — CLIP APPLIER 11" MED LIGACLIP MCM20

## (undated) DEVICE — SU SECURESEAL SKIN ADHESIVE 0.5ML CHSS-05

## (undated) DEVICE — SYR 10ML FINGER CONTROL W/O NDL 309695

## (undated) DEVICE — DECANTER VIAL 2006S

## (undated) DEVICE — GLOVE PROTEXIS W/NEU-THERA 7.5  2D73TE75

## (undated) DEVICE — SU VICRYL 2-0 CT-2 27" UND J269H

## (undated) DEVICE — SU SILK 2-0 SH CR 5X18" C0125

## (undated) DEVICE — SPONGE RAY-TEC 4X8" 7318

## (undated) DEVICE — BLADE KNIFE SURG 15 371115

## (undated) DEVICE — BASIN SET MINOR DISP

## (undated) RX ORDER — FENTANYL CITRATE 50 UG/ML
INJECTION, SOLUTION INTRAMUSCULAR; INTRAVENOUS
Status: DISPENSED
Start: 2024-05-07

## (undated) RX ORDER — LIDOCAINE HYDROCHLORIDE AND EPINEPHRINE 10; 10 MG/ML; UG/ML
INJECTION, SOLUTION INFILTRATION; PERINEURAL
Status: DISPENSED
Start: 2020-01-27

## (undated) RX ORDER — PROPOFOL 10 MG/ML
INJECTION, EMULSION INTRAVENOUS
Status: DISPENSED
Start: 2024-05-07

## (undated) RX ORDER — LIDOCAINE HYDROCHLORIDE 10 MG/ML
INJECTION, SOLUTION EPIDURAL; INFILTRATION; INTRACAUDAL; PERINEURAL
Status: DISPENSED
Start: 2020-01-27

## (undated) RX ORDER — ACETAMINOPHEN 325 MG/1
TABLET ORAL
Status: DISPENSED
Start: 2020-01-27

## (undated) RX ORDER — DEXAMETHASONE SODIUM PHOSPHATE 4 MG/ML
INJECTION, SOLUTION INTRA-ARTICULAR; INTRALESIONAL; INTRAMUSCULAR; INTRAVENOUS; SOFT TISSUE
Status: DISPENSED
Start: 2020-01-27

## (undated) RX ORDER — GABAPENTIN 300 MG/1
CAPSULE ORAL
Status: DISPENSED
Start: 2020-01-27

## (undated) RX ORDER — ONDANSETRON 2 MG/ML
INJECTION INTRAMUSCULAR; INTRAVENOUS
Status: DISPENSED
Start: 2020-01-27

## (undated) RX ORDER — CLINDAMYCIN PHOSPHATE 900 MG/50ML
INJECTION, SOLUTION INTRAVENOUS
Status: DISPENSED
Start: 2020-01-27

## (undated) RX ORDER — PROPOFOL 10 MG/ML
INJECTION, EMULSION INTRAVENOUS
Status: DISPENSED
Start: 2020-01-27

## (undated) RX ORDER — BUPIVACAINE HYDROCHLORIDE 5 MG/ML
INJECTION, SOLUTION PERINEURAL
Status: DISPENSED
Start: 2020-01-27

## (undated) RX ORDER — FENTANYL CITRATE 50 UG/ML
INJECTION, SOLUTION INTRAMUSCULAR; INTRAVENOUS
Status: DISPENSED
Start: 2020-01-27